# Patient Record
Sex: FEMALE | Race: WHITE | NOT HISPANIC OR LATINO | Employment: OTHER | URBAN - NONMETROPOLITAN AREA
[De-identification: names, ages, dates, MRNs, and addresses within clinical notes are randomized per-mention and may not be internally consistent; named-entity substitution may affect disease eponyms.]

---
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
## 2018-01-25 ENCOUNTER — HOSPITAL ENCOUNTER (EMERGENCY)
Facility: HOSPITAL | Age: 18
Discharge: HOME | End: 2018-01-25
Attending: EMERGENCY MEDICINE | Admitting: EMERGENCY MEDICINE

## 2018-01-25 VITALS
HEART RATE: 86 BPM | HEIGHT: 66 IN | SYSTOLIC BLOOD PRESSURE: 117 MMHG | RESPIRATION RATE: 17 BRPM | BODY MASS INDEX: 21.69 KG/M2 | DIASTOLIC BLOOD PRESSURE: 73 MMHG | WEIGHT: 135 LBS | OXYGEN SATURATION: 100 % | TEMPERATURE: 97.3 F

## 2018-01-25 DIAGNOSIS — J02.0 STREP PHARYNGITIS: Primary | ICD-10-CM

## 2018-01-25 DIAGNOSIS — Z34.90 PREGNANCY, UNSPECIFIED GESTATIONAL AGE: ICD-10-CM

## 2018-01-25 LAB — S PYO AG THROAT QL: POSITIVE

## 2018-01-25 PROCEDURE — 87880 STREP A ASSAY W/OPTIC: CPT | Performed by: EMERGENCY MEDICINE

## 2018-01-25 PROCEDURE — 81025 URINE PREGNANCY TEST: CPT | Performed by: EMERGENCY MEDICINE

## 2018-01-25 PROCEDURE — 99283 EMERGENCY DEPT VISIT LOW MDM: CPT

## 2018-01-25 RX ORDER — PNV NO.118/IRON FUMARATE/FA 29 MG-1 MG
1 TABLET,CHEWABLE ORAL DAILY
Qty: 90 TABLET | Refills: 3 | Status: SHIPPED | OUTPATIENT
Start: 2018-01-25 | End: 2019-01-25

## 2018-01-25 RX ORDER — AMOXICILLIN 875 MG/1
875 TABLET, COATED ORAL EVERY 12 HOURS SCHEDULED
Qty: 14 TABLET | Refills: 0 | Status: SHIPPED | OUTPATIENT
Start: 2018-01-25 | End: 2018-02-01

## 2018-01-25 NOTE — ED PROVIDER NOTES
Subjective   HPI Comments: 18 female presents with a sore throat for 2 days.  She also is concerned she might be pregnant.  She states her last period was very light and she is unsure when she had a normal period.  She denies abdominal pain no vaginal bleeding.  Denies any fever or chills.  The pain is worse with swallowing.      History provided by:  Patient   used: No        Review of Systems   HENT: Positive for sore throat.    All other systems reviewed and are negative.      Past Medical History:   Diagnosis Date   • Depression        No Known Allergies    History reviewed. No pertinent surgical history.    History reviewed. No pertinent family history.    Social History     Social History   • Marital status: Single     Spouse name: N/A   • Number of children: N/A   • Years of education: N/A     Social History Main Topics   • Smoking status: Never Smoker   • Smokeless tobacco: None   • Alcohol use No   • Drug use: No   • Sexual activity: Yes     Other Topics Concern   • None     Social History Narrative   • None           Objective   Physical Exam   Constitutional: She is oriented to person, place, and time. She appears well-developed and well-nourished.   HENT:   Head: Atraumatic.   Right Ear: External ear normal.   Left Ear: External ear normal.   Eyes: EOM are normal.   Neck: Normal range of motion. Neck supple.   Cardiovascular: Normal rate and regular rhythm.    Pulmonary/Chest: Effort normal and breath sounds normal.   Abdominal: Soft. Bowel sounds are normal.   Musculoskeletal: Normal range of motion.   Neurological: She is alert and oriented to person, place, and time. She has normal reflexes.   Skin: Skin is warm and dry.   Psychiatric: She has a normal mood and affect. Her behavior is normal.   Nursing note and vitals reviewed.      Procedures         ED Course  ED Course                  MDM    Final diagnoses:   Strep pharyngitis   Pregnancy, unspecified gestational age             Eber Cotton Jr., PA-C  01/25/18 1200

## 2018-02-14 ENCOUNTER — HOSPITAL ENCOUNTER (EMERGENCY)
Facility: HOSPITAL | Age: 18
Discharge: HOME | End: 2018-02-14
Attending: EMERGENCY MEDICINE | Admitting: EMERGENCY MEDICINE

## 2018-02-14 VITALS
HEIGHT: 66 IN | BODY MASS INDEX: 23.63 KG/M2 | SYSTOLIC BLOOD PRESSURE: 109 MMHG | DIASTOLIC BLOOD PRESSURE: 74 MMHG | HEART RATE: 94 BPM | TEMPERATURE: 97.7 F | OXYGEN SATURATION: 97 % | WEIGHT: 147 LBS | RESPIRATION RATE: 17 BRPM

## 2018-02-14 DIAGNOSIS — N30.00 ACUTE CYSTITIS WITHOUT HEMATURIA: Primary | ICD-10-CM

## 2018-02-14 LAB
B-HCG UR QL: POSITIVE
BACTERIA UR QL AUTO: ABNORMAL /HPF
BILIRUB UR QL STRIP: ABNORMAL
CLARITY UR: ABNORMAL
COLOR UR: YELLOW
GLUCOSE UR STRIP-MCNC: NEGATIVE MG/DL
HGB UR QL STRIP.AUTO: ABNORMAL
HYALINE CASTS UR QL AUTO: ABNORMAL /LPF
KETONES UR QL STRIP: ABNORMAL
LEUKOCYTE ESTERASE UR QL STRIP.AUTO: ABNORMAL
NITRITE UR QL STRIP: NEGATIVE
PH UR STRIP.AUTO: 8.5 [PH] (ref 5–8)
PROT UR QL STRIP: ABNORMAL
RBC # UR: ABNORMAL /HPF
REF LAB TEST METHOD: ABNORMAL
SP GR UR STRIP: 1.02 (ref 1–1.03)
SQUAMOUS #/AREA URNS HPF: ABNORMAL /HPF
UROBILINOGEN UR QL STRIP: ABNORMAL
WBC UR QL AUTO: ABNORMAL /HPF

## 2018-02-14 PROCEDURE — 81025 URINE PREGNANCY TEST: CPT | Performed by: PHYSICIAN ASSISTANT

## 2018-02-14 PROCEDURE — 87077 CULTURE AEROBIC IDENTIFY: CPT | Performed by: PHYSICIAN ASSISTANT

## 2018-02-14 PROCEDURE — 99283 EMERGENCY DEPT VISIT LOW MDM: CPT

## 2018-02-14 PROCEDURE — 81001 URINALYSIS AUTO W/SCOPE: CPT | Performed by: PHYSICIAN ASSISTANT

## 2018-02-14 PROCEDURE — 87186 SC STD MICRODIL/AGAR DIL: CPT | Performed by: PHYSICIAN ASSISTANT

## 2018-02-14 PROCEDURE — 87147 CULTURE TYPE IMMUNOLOGIC: CPT | Performed by: PHYSICIAN ASSISTANT

## 2018-02-14 PROCEDURE — 87086 URINE CULTURE/COLONY COUNT: CPT | Performed by: PHYSICIAN ASSISTANT

## 2018-02-14 RX ORDER — NITROFURANTOIN 25; 75 MG/1; MG/1
100 CAPSULE ORAL EVERY 12 HOURS SCHEDULED
Qty: 10 CAPSULE | Refills: 0 | Status: SHIPPED | OUTPATIENT
Start: 2018-02-14 | End: 2018-02-19

## 2018-02-14 NOTE — ED PROVIDER NOTES
Subjective   HPI Comments: 18 yr old female presents with painful urination, she is pregnant.  She states she took a home pregnancy test on Jan 25th.  She has not seen an OB/GYN yet at this point.  She does not remember her last missed her cycle.  She is complaining of painful urination earning and urinary frequency.      History provided by:  Patient   used: No        Review of Systems   Genitourinary: Positive for decreased urine volume, dysuria, frequency and urgency.   All other systems reviewed and are negative.      Past Medical History:   Diagnosis Date   • Depression        No Known Allergies    History reviewed. No pertinent surgical history.    History reviewed. No pertinent family history.    Social History     Social History   • Marital status: Single     Spouse name: N/A   • Number of children: N/A   • Years of education: N/A     Social History Main Topics   • Smoking status: Never Smoker   • Smokeless tobacco: None   • Alcohol use No   • Drug use: No   • Sexual activity: Yes     Other Topics Concern   • None     Social History Narrative           Objective   Physical Exam   Constitutional: She is oriented to person, place, and time. She appears well-developed and well-nourished.   Eyes: EOM are normal.   Neck: Normal range of motion. Neck supple.   Cardiovascular: Normal rate and regular rhythm.    Pulmonary/Chest: Effort normal and breath sounds normal.   Abdominal: Soft. Bowel sounds are normal. There is no rebound and no guarding.   Musculoskeletal: Normal range of motion.   Neurological: She is alert and oriented to person, place, and time. She has normal reflexes.   Skin: Skin is warm and dry.   Psychiatric: She has a normal mood and affect. Her behavior is normal. Judgment and thought content normal.   Nursing note and vitals reviewed.      Procedures         ED Course  ED Course                  MDM    Final diagnoses:   Acute cystitis without hematuria            Eber  Lula Cameron PA-C  02/14/18 1743

## 2018-02-15 LAB — B-HCG UR QL: POSITIVE

## 2018-02-17 LAB
BACTERIA SPEC AEROBE CULT: ABNORMAL
BACTERIA SPEC AEROBE CULT: ABNORMAL

## 2018-03-17 ENCOUNTER — APPOINTMENT (OUTPATIENT)
Dept: ULTRASOUND IMAGING | Facility: HOSPITAL | Age: 18
End: 2018-03-17

## 2018-03-17 ENCOUNTER — HOSPITAL ENCOUNTER (EMERGENCY)
Facility: HOSPITAL | Age: 18
Discharge: HOME | End: 2018-03-17
Attending: EMERGENCY MEDICINE | Admitting: EMERGENCY MEDICINE

## 2018-03-17 VITALS
DIASTOLIC BLOOD PRESSURE: 64 MMHG | RESPIRATION RATE: 16 BRPM | OXYGEN SATURATION: 99 % | WEIGHT: 143.6 LBS | SYSTOLIC BLOOD PRESSURE: 107 MMHG | BODY MASS INDEX: 23.08 KG/M2 | TEMPERATURE: 98 F | HEIGHT: 66 IN | HEART RATE: 86 BPM

## 2018-03-17 DIAGNOSIS — N39.0 URINARY TRACT INFECTION WITHOUT HEMATURIA, SITE UNSPECIFIED: ICD-10-CM

## 2018-03-17 DIAGNOSIS — O20.0 THREATENED MISCARRIAGE: Primary | ICD-10-CM

## 2018-03-17 LAB
ABO GROUP BLD: NORMAL
ALBUMIN SERPL-MCNC: 4 G/DL (ref 3.5–5)
ALBUMIN/GLOB SERPL: 1.5 G/DL (ref 1–2)
ALP SERPL-CCNC: 43 U/L (ref 38–126)
ALT SERPL W P-5'-P-CCNC: 27 U/L (ref 13–69)
ANION GAP SERPL CALCULATED.3IONS-SCNC: 16.5 MMOL/L
AST SERPL-CCNC: 18 U/L (ref 15–46)
BACTERIA UR QL AUTO: ABNORMAL /HPF
BASOPHILS # BLD AUTO: 0.02 10*3/MM3 (ref 0–0.2)
BASOPHILS NFR BLD AUTO: 0.3 % (ref 0–2.5)
BILIRUB SERPL-MCNC: 0.3 MG/DL (ref 0.2–1.3)
BILIRUB UR QL STRIP: NEGATIVE
BUN BLD-MCNC: 6 MG/DL (ref 7–20)
BUN/CREAT SERPL: 15 (ref 7.1–23.5)
CALCIUM SPEC-SCNC: 9.1 MG/DL (ref 8.4–10.2)
CHLORIDE SERPL-SCNC: 106 MMOL/L (ref 98–107)
CLARITY UR: ABNORMAL
CO2 SERPL-SCNC: 23 MMOL/L (ref 26–30)
COLOR UR: YELLOW
CREAT BLD-MCNC: 0.4 MG/DL (ref 0.6–1.3)
DEPRECATED RDW RBC AUTO: 37.1 FL (ref 37–54)
EOSINOPHIL # BLD AUTO: 0.19 10*3/MM3 (ref 0–0.7)
EOSINOPHIL NFR BLD AUTO: 2.6 % (ref 0–7)
ERYTHROCYTE [DISTWIDTH] IN BLOOD BY AUTOMATED COUNT: 12.4 % (ref 11.5–14.5)
GFR SERPL CREATININE-BSD FRML MDRD: >150 ML/MIN/1.73
GLOBULIN UR ELPH-MCNC: 2.7 GM/DL
GLUCOSE BLD-MCNC: 84 MG/DL (ref 74–98)
GLUCOSE UR STRIP-MCNC: NEGATIVE MG/DL
HCG INTACT+B SERPL-ACNC: NORMAL MIU/ML
HCT VFR BLD AUTO: 35.2 % (ref 37–47)
HGB BLD-MCNC: 12.5 G/DL (ref 12–16)
HGB UR QL STRIP.AUTO: ABNORMAL
HYALINE CASTS UR QL AUTO: ABNORMAL /LPF
IMM GRANULOCYTES # BLD: 0.02 10*3/MM3 (ref 0–0.06)
IMM GRANULOCYTES NFR BLD: 0.3 % (ref 0–0.6)
KETONES UR QL STRIP: NEGATIVE
LEUKOCYTE ESTERASE UR QL STRIP.AUTO: NEGATIVE
LYMPHOCYTES # BLD AUTO: 2.38 10*3/MM3 (ref 0.6–3.4)
LYMPHOCYTES NFR BLD AUTO: 32.5 % (ref 10–50)
MCH RBC QN AUTO: 29.3 PG (ref 27–31)
MCHC RBC AUTO-ENTMCNC: 35.5 G/DL (ref 30–37)
MCV RBC AUTO: 82.6 FL (ref 81–99)
MONOCYTES # BLD AUTO: 0.42 10*3/MM3 (ref 0–0.9)
MONOCYTES NFR BLD AUTO: 5.7 % (ref 0–12)
NEUTROPHILS # BLD AUTO: 4.3 10*3/MM3 (ref 2–6.9)
NEUTROPHILS NFR BLD AUTO: 58.6 % (ref 37–80)
NITRITE UR QL STRIP: NEGATIVE
NRBC BLD MANUAL-RTO: 0 /100 WBC (ref 0–0)
PH UR STRIP.AUTO: <=5 [PH] (ref 5–8)
PLATELET # BLD AUTO: 114 10*3/MM3 (ref 130–400)
PMV BLD AUTO: 10.8 FL (ref 6–12)
POTASSIUM BLD-SCNC: 3.5 MMOL/L (ref 3.5–5.1)
PROT SERPL-MCNC: 6.7 G/DL (ref 6.3–8.2)
PROT UR QL STRIP: NEGATIVE
RBC # BLD AUTO: 4.26 10*6/MM3 (ref 4.2–5.4)
RBC # UR: ABNORMAL /HPF
REF LAB TEST METHOD: ABNORMAL
RH BLD: POSITIVE
SODIUM BLD-SCNC: 142 MMOL/L (ref 137–145)
SP GR UR STRIP: >=1.03 (ref 1–1.03)
SQUAMOUS #/AREA URNS HPF: ABNORMAL /HPF
UROBILINOGEN UR QL STRIP: ABNORMAL
WBC NRBC COR # BLD: 7.33 10*3/MM3 (ref 4.8–10.8)
WBC UR QL AUTO: ABNORMAL /HPF

## 2018-03-17 PROCEDURE — 86900 BLOOD TYPING SEROLOGIC ABO: CPT | Performed by: EMERGENCY MEDICINE

## 2018-03-17 PROCEDURE — 36415 COLL VENOUS BLD VENIPUNCTURE: CPT

## 2018-03-17 PROCEDURE — 84702 CHORIONIC GONADOTROPIN TEST: CPT | Performed by: EMERGENCY MEDICINE

## 2018-03-17 PROCEDURE — 80053 COMPREHEN METABOLIC PANEL: CPT | Performed by: EMERGENCY MEDICINE

## 2018-03-17 PROCEDURE — 86901 BLOOD TYPING SEROLOGIC RH(D): CPT | Performed by: EMERGENCY MEDICINE

## 2018-03-17 PROCEDURE — 85025 COMPLETE CBC W/AUTO DIFF WBC: CPT | Performed by: EMERGENCY MEDICINE

## 2018-03-17 PROCEDURE — 81001 URINALYSIS AUTO W/SCOPE: CPT | Performed by: EMERGENCY MEDICINE

## 2018-03-17 PROCEDURE — 76801 OB US < 14 WKS SINGLE FETUS: CPT

## 2018-03-17 PROCEDURE — 99283 EMERGENCY DEPT VISIT LOW MDM: CPT

## 2018-03-17 RX ORDER — CEPHALEXIN 500 MG/1
500 CAPSULE ORAL 2 TIMES DAILY
Qty: 14 CAPSULE | Refills: 0 | Status: SHIPPED | OUTPATIENT
Start: 2018-03-17 | End: 2018-04-18

## 2018-03-17 RX ORDER — PROMETHAZINE HYDROCHLORIDE 25 MG/1
25 TABLET ORAL EVERY 6 HOURS PRN
Qty: 20 TABLET | Refills: 0 | Status: SHIPPED | OUTPATIENT
Start: 2018-03-17 | End: 2020-10-12

## 2018-03-17 NOTE — ED PROVIDER NOTES
TRIAGE CHIEF COMPLAINT:     Nursing and triage notes reviewed    Chief Complaint   Patient presents with   • Vaginal Bleeding - Pregnant      HPI: Pamela Rothman is a 18 y.o. female who presents to the emergency department complaining of Vaginal bleeding.  Patient states she is currently approximately 13 weeks pregnant and when using the bathroom today had noticed some blood when she was wiping.  Does continue to have some mild bleeding since that time.  She denies any lower abdominal pain or cramping.  She states she did have urinary tract infection recently did not take all of her antibiotics.  She denies any current dysuria or urinary frequency.  She denies fevers or chills.  Denies other complaints currently.     REVIEW OF SYSTEMS: All other systems reviewed and are negative     PAST MEDICAL HISTORY:   Past Medical History:   Diagnosis Date   • Depression         FAMILY HISTORY:   History reviewed. No pertinent family history.     SOCIAL HISTORY:   Social History     Social History   • Marital status: Single     Spouse name: N/A   • Number of children: N/A   • Years of education: N/A     Occupational History   • Not on file.     Social History Main Topics   • Smoking status: Never Smoker   • Smokeless tobacco: Not on file   • Alcohol use No   • Drug use: No   • Sexual activity: Yes     Other Topics Concern   • Not on file     Social History Narrative   • No narrative on file        SURGICAL HISTORY:   History reviewed. No pertinent surgical history.     CURRENT MEDICATIONS:      Medication List      ASK your doctor about these medications    doxycycline 100 MG capsule  Commonly known as:  MONODOX  Take 1 capsule by mouth 2 (Two) Times a Day.     PRENATAL 19 chewable tablet  Chew and swallow 1 tablet by mouth once daily.     sertraline 25 MG tablet  Commonly known as:  ZOLOFT  Take 1 tablet by mouth Every Night.           ALLERGIES: Review of patient's allergies indicates no known allergies.     PHYSICAL  EXAM:   VITAL SIGNS:   Vitals:    18 0420   BP: 107/64   Pulse: 86   Resp: 16   Temp: 98 °F (36.7 °C)   SpO2: 99%      CONSTITUTIONAL: Awake, oriented, appears non-toxic   HENT: Atraumatic, normocephalic, oral mucosa pink and moist, airway patent.  EYES: Conjunctiva clear   NECK: Trachea midline   CARDIOVASCULAR: Normal heart rate, Normal rhythm, No murmurs, rubs, gallops   PULMONARY/CHEST: Clear to auscultation, no rhonchi, wheezes, or rales. Symmetrical breath sounds  ABDOMINAL: Non-distended, soft, non-tender - no rebound or guarding.  NEUROLOGIC: Non-focal, moving all four extremities, no gross sensory or motor deficits.   EXTREMITIES: No clubbing, cyanosis, or edema   SKIN: Warm, Dry, No erythema, No rash     ED COURSE / MEDICAL DECISION MAKING:   Pamela Rothman is a 18 y.o. female who presents to the emergency department for evaluation of vaginal bleeding.  Patient has stable vital signs on arrival.  Patient is nondistressed.  Obtained labs and an ultrasound for further evaluation.  Urine revealed possible slight remaining urinary infection.  There was a large amount of blood as well.  Other labs unremarkable.  Ultrasound obtained which revealed a living intrauterine gestation at just over 13 weeks.  No obvious abnormalities were seen.  We'll treat patient's infection and have her follow with her OB/GYN physician on Monday.  Return precautions discussed.    DECISION TO DISCHARGE/ADMIT: see ED care timeline     FINAL IMPRESSION:   1 -- threatened    2 -- UTI  3 --     Electronically signed by: Patria Snyder MD, 3/17/2018 4:36 AM       Patria Snyder MD  18 0438

## 2018-04-18 ENCOUNTER — APPOINTMENT (OUTPATIENT)
Dept: ULTRASOUND IMAGING | Facility: HOSPITAL | Age: 18
End: 2018-04-18

## 2018-04-18 ENCOUNTER — HOSPITAL ENCOUNTER (EMERGENCY)
Facility: HOSPITAL | Age: 18
Discharge: HOME | End: 2018-04-18
Attending: EMERGENCY MEDICINE | Admitting: EMERGENCY MEDICINE

## 2018-04-18 VITALS
SYSTOLIC BLOOD PRESSURE: 96 MMHG | OXYGEN SATURATION: 99 % | DIASTOLIC BLOOD PRESSURE: 59 MMHG | TEMPERATURE: 98.1 F | WEIGHT: 138.2 LBS | RESPIRATION RATE: 18 BRPM | HEIGHT: 66 IN | BODY MASS INDEX: 22.21 KG/M2 | HEART RATE: 72 BPM

## 2018-04-18 DIAGNOSIS — O46.92 ANTEPARTUM BLEEDING, SECOND TRIMESTER: Primary | ICD-10-CM

## 2018-04-18 LAB
BACTERIA UR QL AUTO: ABNORMAL /HPF
BASOPHILS # BLD AUTO: 0.02 10*3/MM3 (ref 0–0.2)
BASOPHILS NFR BLD AUTO: 0.2 % (ref 0–2.5)
BILIRUB UR QL STRIP: NEGATIVE
CLARITY UR: ABNORMAL
COLOR UR: YELLOW
DEPRECATED RDW RBC AUTO: 41.7 FL (ref 37–54)
EOSINOPHIL # BLD AUTO: 0.22 10*3/MM3 (ref 0–0.7)
EOSINOPHIL NFR BLD AUTO: 2.4 % (ref 0–7)
ERYTHROCYTE [DISTWIDTH] IN BLOOD BY AUTOMATED COUNT: 13.7 % (ref 11.5–14.5)
GLUCOSE UR STRIP-MCNC: NEGATIVE MG/DL
HCT VFR BLD AUTO: 34.5 % (ref 37–47)
HGB BLD-MCNC: 12 G/DL (ref 12–16)
HGB UR QL STRIP.AUTO: ABNORMAL
HYALINE CASTS UR QL AUTO: ABNORMAL /LPF
IMM GRANULOCYTES # BLD: 0.03 10*3/MM3 (ref 0–0.06)
IMM GRANULOCYTES NFR BLD: 0.3 % (ref 0–0.6)
KETONES UR QL STRIP: NEGATIVE
LEUKOCYTE ESTERASE UR QL STRIP.AUTO: ABNORMAL
LYMPHOCYTES # BLD AUTO: 2.39 10*3/MM3 (ref 0.6–3.4)
LYMPHOCYTES NFR BLD AUTO: 25.8 % (ref 10–50)
MCH RBC QN AUTO: 29.3 PG (ref 27–31)
MCHC RBC AUTO-ENTMCNC: 34.8 G/DL (ref 30–37)
MCV RBC AUTO: 84.1 FL (ref 81–99)
MONOCYTES # BLD AUTO: 0.58 10*3/MM3 (ref 0–0.9)
MONOCYTES NFR BLD AUTO: 6.3 % (ref 0–12)
NEUTROPHILS # BLD AUTO: 6.02 10*3/MM3 (ref 2–6.9)
NEUTROPHILS NFR BLD AUTO: 65 % (ref 37–80)
NITRITE UR QL STRIP: NEGATIVE
NRBC BLD MANUAL-RTO: 0 /100 WBC (ref 0–0)
PH UR STRIP.AUTO: 6.5 [PH] (ref 5–8)
PLATELET # BLD AUTO: 138 10*3/MM3 (ref 130–400)
PMV BLD AUTO: 11.3 FL (ref 6–12)
PROT UR QL STRIP: NEGATIVE
RBC # BLD AUTO: 4.1 10*6/MM3 (ref 4.2–5.4)
RBC # UR: ABNORMAL /HPF
REF LAB TEST METHOD: ABNORMAL
SP GR UR STRIP: >=1.03 (ref 1–1.03)
SQUAMOUS #/AREA URNS HPF: ABNORMAL /HPF
UROBILINOGEN UR QL STRIP: ABNORMAL
WBC NRBC COR # BLD: 9.26 10*3/MM3 (ref 4.8–10.8)
WBC UR QL AUTO: ABNORMAL /HPF

## 2018-04-18 PROCEDURE — 85025 COMPLETE CBC W/AUTO DIFF WBC: CPT | Performed by: EMERGENCY MEDICINE

## 2018-04-18 PROCEDURE — 81001 URINALYSIS AUTO W/SCOPE: CPT | Performed by: EMERGENCY MEDICINE

## 2018-04-18 PROCEDURE — 36415 COLL VENOUS BLD VENIPUNCTURE: CPT

## 2018-04-18 PROCEDURE — 99283 EMERGENCY DEPT VISIT LOW MDM: CPT

## 2018-04-18 PROCEDURE — 76805 OB US >/= 14 WKS SNGL FETUS: CPT

## 2018-04-18 NOTE — ED PROVIDER NOTES
Subjective   Patient 18-year-old  1 para 0 at approximately 18 weeks gestation a previous ultrasound.  States she started having bleeding today almost as heavy as a period.  Denies recent sexual intercourse.  Some mild lower abdominal pain without cramping.  Denies urinary symptoms denies vaginal discharge        History provided by:  Patient   used: No    Female  Problem   Primary symptoms include vaginal bleeding.  Primary symptoms include no dysuria. There has been no fever. This is a new problem. The current episode started 1 to 2 hours ago. The problem occurs constantly. The problem has not changed since onset.The symptoms occur at rest. She is pregnant. Pertinent negatives include no abdominal pain. She has tried nothing for the symptoms. Sexual activity: sexually active.       Review of Systems   Constitutional: Negative.  Negative for fever.   HENT: Negative.    Respiratory: Negative.    Cardiovascular: Negative.  Negative for chest pain.   Gastrointestinal: Negative.  Negative for abdominal pain.   Endocrine: Negative.    Genitourinary: Positive for vaginal bleeding. Negative for dysuria.   Skin: Negative.    Neurological: Negative.    Psychiatric/Behavioral: Negative.    All other systems reviewed and are negative.      Past Medical History:   Diagnosis Date   • Depression        No Known Allergies    History reviewed. No pertinent surgical history.    History reviewed. No pertinent family history.    Social History     Social History   • Marital status: Single     Social History Main Topics   • Smoking status: Never Smoker   • Alcohol use No   • Drug use: No   • Sexual activity: Yes     Other Topics Concern   • Not on file           Objective   Physical Exam   Constitutional: She is oriented to person, place, and time. She appears well-developed and well-nourished. No distress.   HENT:   Head: Normocephalic and atraumatic.   Right Ear: External ear normal.   Left Ear: External  ear normal.   Nose: Nose normal.   Eyes: Conjunctivae and EOM are normal. Pupils are equal, round, and reactive to light.   Neck: Normal range of motion. Neck supple. No JVD present. No tracheal deviation present.   Cardiovascular: Normal rate, regular rhythm and normal heart sounds.    No murmur heard.  Pulmonary/Chest: Effort normal and breath sounds normal. No respiratory distress. She has no wheezes.   Abdominal: Soft. Bowel sounds are normal. There is no tenderness.   Musculoskeletal: Normal range of motion. She exhibits no edema or deformity.   Neurological: She is alert and oriented to person, place, and time. No cranial nerve deficit.   Skin: Skin is warm and dry. No rash noted. She is not diaphoretic. No erythema. No pallor.   Psychiatric: She has a normal mood and affect. Her behavior is normal. Thought content normal.   Nursing note and vitals reviewed.      Procedures        US Ob 14 + Weeks Single or First Gestation   Final Result   Living intrauterine pregnancy with normal anterior placenta.      Authenticated by Jose Parker M.D. on 04/18/2018 08:16:49 PM            ED Course  ED Course      Patient states the bleeding has decreased            MDM    Final diagnoses:   Antepartum bleeding, second trimester            Marcial Carroll MD  04/18/18 1755

## 2018-08-03 ENCOUNTER — HOSPITAL ENCOUNTER (OUTPATIENT)
Facility: HOSPITAL | Age: 18
Discharge: HOME | End: 2018-08-03
Attending: OBSTETRICS & GYNECOLOGY | Admitting: MIDWIFE

## 2018-08-03 PROBLEM — Z34.90 PREGNANCY: Status: ACTIVE | Noted: 2018-08-03

## 2018-08-03 LAB
AMORPH URATE CRY URNS QL MICRO: ABNORMAL /HPF
AMPHET+METHAMPHET UR QL: NEGATIVE
AMPHETAMINES UR QL: NEGATIVE
BACTERIA UR QL AUTO: ABNORMAL /HPF
BARBITURATES UR QL SCN: NEGATIVE
BENZODIAZ UR QL SCN: NEGATIVE
BILIRUB UR QL STRIP: NEGATIVE
BUPRENORPHINE SERPL-MCNC: NEGATIVE NG/ML
CANNABINOIDS SERPL QL: NEGATIVE
CLARITY UR: CLEAR
COCAINE UR QL: NEGATIVE
COLOR UR: YELLOW
GLUCOSE UR STRIP-MCNC: NEGATIVE MG/DL
HGB UR QL STRIP.AUTO: ABNORMAL
HYALINE CASTS UR QL AUTO: ABNORMAL /LPF
KETONES UR QL STRIP: ABNORMAL
LEUKOCYTE ESTERASE UR QL STRIP.AUTO: ABNORMAL
METHADONE UR QL SCN: NEGATIVE
MUCOUS THREADS URNS QL MICRO: ABNORMAL /HPF
NITRITE UR QL STRIP: NEGATIVE
OPIATES UR QL: NEGATIVE
OXYCODONE UR QL SCN: NEGATIVE
PCP UR QL SCN: NEGATIVE
PH UR STRIP.AUTO: 6 [PH] (ref 5–8)
PROPOXYPH UR QL: NEGATIVE
PROT UR QL STRIP: ABNORMAL
RBC # UR: ABNORMAL /HPF
REF LAB TEST METHOD: ABNORMAL
SP GR UR STRIP: 1.02 (ref 1–1.03)
SQUAMOUS #/AREA URNS HPF: ABNORMAL /HPF
TRICYCLICS UR QL SCN: NEGATIVE
UROBILINOGEN UR QL STRIP: ABNORMAL
WBC UR QL AUTO: ABNORMAL /HPF

## 2018-08-03 PROCEDURE — 80306 DRUG TEST PRSMV INSTRMNT: CPT | Performed by: MIDWIFE

## 2018-08-03 PROCEDURE — G0463 HOSPITAL OUTPT CLINIC VISIT: HCPCS

## 2018-08-03 PROCEDURE — 59025 FETAL NON-STRESS TEST: CPT

## 2018-08-03 PROCEDURE — 59025 FETAL NON-STRESS TEST: CPT | Performed by: MIDWIFE

## 2018-08-03 PROCEDURE — 81001 URINALYSIS AUTO W/SCOPE: CPT | Performed by: MIDWIFE

## 2018-08-03 PROCEDURE — 87086 URINE CULTURE/COLONY COUNT: CPT | Performed by: MIDWIFE

## 2018-08-04 VITALS
OXYGEN SATURATION: 99 % | HEIGHT: 66 IN | BODY MASS INDEX: 25.39 KG/M2 | DIASTOLIC BLOOD PRESSURE: 69 MMHG | SYSTOLIC BLOOD PRESSURE: 116 MMHG | TEMPERATURE: 98.3 F | HEART RATE: 89 BPM | WEIGHT: 158 LBS | RESPIRATION RATE: 18 BRPM

## 2018-08-04 NOTE — NON STRESS TEST
"  Pamela Rothman, a  at 32w5d with an RODGER of 2018, by Last Menstrual Period, was seen at [facility] for a nonstress test.    Chief Complaint   Patient presents with   • Back Pain     \"I've been having back pain and cramps all day.\"       Patient Active Problem List   Diagnosis   • Depression with suicidal ideation   • Amphetamine and other psychostimulant dependence, continuous   • Cocaine dependence, unspecified   • Cannabis dependence, unspecified   • Opioid abuse, unspecified   • Pregnancy       Start Time:  Stop Time:    Interpretation A  Nonstress Test Interpretation A: Reactive (18 : Michael Tamayo RN)        "

## 2018-08-04 NOTE — DISCHARGE INSTRUCTIONS
Drink at least a gallon of water every day.  May take Tylenol as directed on bottle for aches and pains. Keep scheduled appointment with Dr. De La Torre. Return to labor/delivery for worsening of symptoms.

## 2018-08-05 LAB — BACTERIA SPEC AEROBE CULT: NO GROWTH

## 2018-08-22 ENCOUNTER — HOSPITAL ENCOUNTER (OUTPATIENT)
Facility: HOSPITAL | Age: 18
Discharge: HOME | End: 2018-08-22
Attending: NURSE PRACTITIONER | Admitting: NURSE PRACTITIONER

## 2018-08-22 VITALS
HEART RATE: 83 BPM | OXYGEN SATURATION: 96 % | HEIGHT: 66 IN | SYSTOLIC BLOOD PRESSURE: 110 MMHG | WEIGHT: 162 LBS | BODY MASS INDEX: 26.03 KG/M2 | TEMPERATURE: 98.4 F | RESPIRATION RATE: 18 BRPM | DIASTOLIC BLOOD PRESSURE: 71 MMHG

## 2018-08-22 LAB
AMORPH URATE CRY URNS QL MICRO: ABNORMAL /HPF
AMPHET+METHAMPHET UR QL: NEGATIVE
AMPHETAMINES UR QL: NEGATIVE
BACTERIA UR QL AUTO: ABNORMAL /HPF
BARBITURATES UR QL SCN: NEGATIVE
BENZODIAZ UR QL SCN: NEGATIVE
BILIRUB UR QL STRIP: ABNORMAL
BUPRENORPHINE SERPL-MCNC: NEGATIVE NG/ML
CANNABINOIDS SERPL QL: NEGATIVE
CLARITY UR: ABNORMAL
COCAINE UR QL: NEGATIVE
COD CRY URNS QL: ABNORMAL /HPF
COLOR UR: ABNORMAL
GLUCOSE UR STRIP-MCNC: ABNORMAL MG/DL
HGB UR QL STRIP.AUTO: ABNORMAL
HYALINE CASTS UR QL AUTO: ABNORMAL /LPF
KETONES UR QL STRIP: ABNORMAL
LEUKOCYTE ESTERASE UR QL STRIP.AUTO: ABNORMAL
METHADONE UR QL SCN: NEGATIVE
NITRITE UR QL STRIP: NEGATIVE
OPIATES UR QL: NEGATIVE
OXYCODONE UR QL SCN: NEGATIVE
PCP UR QL SCN: NEGATIVE
PH UR STRIP.AUTO: 5.5 [PH] (ref 5–8)
PROPOXYPH UR QL: NEGATIVE
PROT UR QL STRIP: ABNORMAL
RBC # UR: ABNORMAL /HPF
REF LAB TEST METHOD: ABNORMAL
SP GR UR STRIP: >=1.03 (ref 1–1.03)
SQUAMOUS #/AREA URNS HPF: ABNORMAL /HPF
TRICYCLICS UR QL SCN: NEGATIVE
UROBILINOGEN UR QL STRIP: ABNORMAL
WBC UR QL AUTO: ABNORMAL /HPF

## 2018-08-22 PROCEDURE — 59025 FETAL NON-STRESS TEST: CPT | Performed by: NURSE PRACTITIONER

## 2018-08-22 PROCEDURE — 87086 URINE CULTURE/COLONY COUNT: CPT | Performed by: NURSE PRACTITIONER

## 2018-08-22 PROCEDURE — 80306 DRUG TEST PRSMV INSTRMNT: CPT | Performed by: NURSE PRACTITIONER

## 2018-08-22 PROCEDURE — 84112 EVAL AMNIOTIC FLUID PROTEIN: CPT | Performed by: NURSE PRACTITIONER

## 2018-08-22 PROCEDURE — G0463 HOSPITAL OUTPT CLINIC VISIT: HCPCS

## 2018-08-22 PROCEDURE — 81001 URINALYSIS AUTO W/SCOPE: CPT | Performed by: NURSE PRACTITIONER

## 2018-08-22 PROCEDURE — 59025 FETAL NON-STRESS TEST: CPT

## 2018-08-22 NOTE — NON STRESS TEST
"  Pamela Rothman, a  at 35w3d with an RODGER of 2018, by Last Menstrual Period, was seen at [facility] for a nonstress test.    Chief Complaint   Patient presents with   • Vaginal Bleeding     \"I have had vaginal bleeding  in my wholepregnancy and an hour ago it seemed alot more and dark red\"       Patient Active Problem List   Diagnosis   • Depression with suicidal ideation   • Amphetamine and other psychostimulant dependence, continuous   • Cocaine dependence, unspecified   • Cannabis dependence, unspecified   • Opioid abuse, unspecified   • Pregnancy       Start Time:436  Stop Time:050      Interpretation A  Nonstress Test Interpretation A: Reactive (18 0502 : Marianela Lala RN)          "

## 2018-08-22 NOTE — NURSING NOTE
"Instructions discussed and reveiwed with + feedback and understand, discussed importance of follow up  with personal MD and sign and symptoms of labor and importance of dring water. Client verbalized she was \"OK\" with going home,  "

## 2018-08-23 LAB — A1 MICROGLOB PLACENTAL VAG QL: NEGATIVE

## 2018-08-24 LAB — BACTERIA SPEC AEROBE CULT: NO GROWTH

## 2019-01-01 ENCOUNTER — HOSPITAL ENCOUNTER (EMERGENCY)
Facility: HOSPITAL | Age: 19
Discharge: HOME | End: 2019-01-01
Attending: EMERGENCY MEDICINE | Admitting: EMERGENCY MEDICINE

## 2019-01-01 VITALS
OXYGEN SATURATION: 98 % | TEMPERATURE: 98 F | SYSTOLIC BLOOD PRESSURE: 121 MMHG | HEIGHT: 66 IN | BODY MASS INDEX: 25.04 KG/M2 | HEART RATE: 106 BPM | DIASTOLIC BLOOD PRESSURE: 89 MMHG | WEIGHT: 155.8 LBS | RESPIRATION RATE: 18 BRPM

## 2019-01-01 DIAGNOSIS — J06.9 VIRAL URI WITH COUGH: Primary | ICD-10-CM

## 2019-01-01 LAB
B-HCG UR QL: NEGATIVE
BACTERIA UR QL AUTO: ABNORMAL /HPF
BILIRUB UR QL STRIP: NEGATIVE
CLARITY UR: ABNORMAL
COLOR UR: YELLOW
FLUAV AG NPH QL: NEGATIVE
FLUBV AG NPH QL IA: NEGATIVE
GLUCOSE UR STRIP-MCNC: NEGATIVE MG/DL
HGB UR QL STRIP.AUTO: NEGATIVE
HYALINE CASTS UR QL AUTO: ABNORMAL /LPF
KETONES UR QL STRIP: ABNORMAL
LEUKOCYTE ESTERASE UR QL STRIP.AUTO: ABNORMAL
MUCOUS THREADS URNS QL MICRO: ABNORMAL /HPF
NITRITE UR QL STRIP: NEGATIVE
PH UR STRIP.AUTO: 5.5 [PH] (ref 5–8)
PROT UR QL STRIP: NEGATIVE
RBC # UR: ABNORMAL /HPF
REF LAB TEST METHOD: ABNORMAL
S PYO AG THROAT QL: NEGATIVE
SP GR UR STRIP: >=1.03 (ref 1–1.03)
SQUAMOUS #/AREA URNS HPF: ABNORMAL /HPF
UROBILINOGEN UR QL STRIP: ABNORMAL
WBC UR QL AUTO: ABNORMAL /HPF

## 2019-01-01 PROCEDURE — 87077 CULTURE AEROBIC IDENTIFY: CPT | Performed by: EMERGENCY MEDICINE

## 2019-01-01 PROCEDURE — 81025 URINE PREGNANCY TEST: CPT | Performed by: NURSE PRACTITIONER

## 2019-01-01 PROCEDURE — 87804 INFLUENZA ASSAY W/OPTIC: CPT | Performed by: EMERGENCY MEDICINE

## 2019-01-01 PROCEDURE — 81001 URINALYSIS AUTO W/SCOPE: CPT | Performed by: NURSE PRACTITIONER

## 2019-01-01 PROCEDURE — 99283 EMERGENCY DEPT VISIT LOW MDM: CPT

## 2019-01-01 PROCEDURE — 87081 CULTURE SCREEN ONLY: CPT | Performed by: EMERGENCY MEDICINE

## 2019-01-01 PROCEDURE — 87880 STREP A ASSAY W/OPTIC: CPT | Performed by: EMERGENCY MEDICINE

## 2019-01-01 RX ORDER — BROMPHENIRAMINE MALEATE, PSEUDOEPHEDRINE HYDROCHLORIDE, AND DEXTROMETHORPHAN HYDROBROMIDE 2; 30; 10 MG/5ML; MG/5ML; MG/5ML
5 SYRUP ORAL 4 TIMES DAILY PRN
Qty: 118 ML | Refills: 0 | Status: SHIPPED | OUTPATIENT
Start: 2019-01-01 | End: 2020-10-12

## 2019-01-01 NOTE — DISCHARGE INSTRUCTIONS
Take-home Medications as prescribed.  Her illness is most likely due to a virus.  By their nature, treatment for viral illnesses primarily aimed at symptom management.  It is important immediately rest, stay very well-hydrated and everyone wash hands often so as to prevent the spread of illness.  Tylenol/Motrin for minor pain or fever management.    Follow up with Primary Care in 2- 3 days if symptoms persist or worsen.  Return to the emergency room for uncontrolled pain, nausea, vomiting,  chest pain, shortness or palpitations.    Thank you for allowing us to participate in your care today; we know that you have a choice in healthcare and  appreciate your confidence in [facility].    You have been supplied with 1 new prescription(s) today.

## 2019-01-01 NOTE — ED PROVIDER NOTES
Subjective   18-year-old female patient presents emergency room for evaluation of generalized body aches, sore throat, right ear pain low-grade fever ×3-4 days.  She states her infant son has an upper respiratory infection as been diagnosed by his pediatrician.  No other sick contacts that he is aware of.  She denies any nausea or vomiting.  She does report some mild dysuria but no hematuria or frequency.  No abdominal pain.  She complains of a cough is productive of some thick green sputum.  Headache, sore throat, right ear pain.  No shortness of air, palpitations.  History of asthma.            Review of Systems  A 10 point review of systems including constitutional, ENT, cardiovascular, respiratory, GI, , musculoskeletal, neuro, skin, psychiatric was performed and it was negative with exception of cough, fever, body ache, right ear pain, sore throat.    Past Medical History:   Diagnosis Date   • Bacterial vaginosis     Doesn't remember date but not during this pregnancy.   • Chlamydia     Doesn't remember date but not during this pregnancy   • Depression     Zoloft in past. Does not take any meds for this now.       No Known Allergies    History reviewed. No pertinent surgical history.    History reviewed. No pertinent family history.    Social History     Socioeconomic History   • Marital status: Single     Spouse name: Not on file   • Number of children: Not on file   • Years of education: Not on file   • Highest education level: Not on file   Tobacco Use   • Smoking status: Never Smoker   Substance and Sexual Activity   • Alcohol use: No   • Drug use: No   • Sexual activity: Yes     Partners: Male     Birth control/protection: None           Objective   Physical Exam   Constitutional: She is oriented to person, place, and time. Vital signs are normal. She appears well-developed and well-nourished.  Non-toxic appearance. She does not have a sickly appearance. She appears ill. No distress.   HENT:   Right Ear:  Hearing, external ear and ear canal normal. There is tenderness. Tympanic membrane is erythematous and bulging.   Left Ear: Hearing, tympanic membrane, external ear and ear canal normal.   Nose: Nose normal.   Mouth/Throat: Mucous membranes are normal. Dental caries present. Posterior oropharyngeal erythema present. No oropharyngeal exudate or tonsillar abscesses.   Eyes: Conjunctivae and EOM are normal. Pupils are equal, round, and reactive to light.   Neck: Normal range of motion. Neck supple.   Cardiovascular: Normal rate, regular rhythm and normal heart sounds.   Pulmonary/Chest: Effort normal and breath sounds normal. No respiratory distress. She has no wheezes.   Abdominal: Soft. Bowel sounds are normal. There is no CVA tenderness.   Musculoskeletal: Normal range of motion.   Neurological: She is alert and oriented to person, place, and time.   Skin: Skin is warm and dry. Capillary refill takes less than 2 seconds. No rash noted.   Psychiatric: She has a normal mood and affect.        Procedures  Lab Results (last 24 hours)     Procedure Component Value Units Date/Time    Urinalysis With Microscopic If Indicated (No Culture) - Urine, Clean Catch [[id number]]  (Abnormal) Collected:  01/01/19 1316    Specimen:  Urine, Clean Catch Updated:  01/01/19 1324     Color, UA Yellow     Appearance, UA Slightly Cloudy     pH, UA 5.5     Specific Gravity, UA >=1.030     Glucose, UA Negative     Ketones, UA Trace     Bilirubin, UA Negative     Blood, UA Negative     Protein, UA Negative     Leuk Esterase, UA Trace     Nitrite, UA Negative     Urobilinogen, UA 0.2 E.U./dL    Pregnancy, Urine - Urine, Clean Catch [[id number]]  (Normal) Collected:  01/01/19 1316    Specimen:  Urine, Clean Catch Updated:  01/01/19 1322     HCG, Urine QL Negative    Urinalysis, Microscopic Only - Urine, Clean Catch [[id number]] Collected:  01/01/19 1316    Specimen:  Urine, Clean Catch Updated:  01/01/19 1321    Influenza Antigen, Rapid - Swab,  Nasopharynx [[id number]]  (Normal) Collected:  01/01/19 1203    Specimen:  Swab from Nasopharynx Updated:  01/01/19 1221     Influenza A Ag, EIA Negative     Influenza B Ag, EIA Negative    Rapid Strep A Screen - Swab, Throat [[id number]]  (Normal) Collected:  01/01/19 1203    Specimen:  Swab from Throat Updated:  01/01/19 1220     Strep A Ag Negative    Beta Strep Culture, Throat - Swab, Throat [[id number]] Collected:  01/01/19 1203    Specimen:  Swab from Throat Updated:  01/01/19 1220          Imaging Results (last 24 hours)     ** No results found for the last 24 hours. **               ED Course      Laboratory studies negative for acute infectious process.  Rapid flu, strep both negative.  Patient is hemodynamically and normothermic course observation.  His examination along with subjective and objective data including laboratory studies distant with the diagnosis of an upper respiratory infection, which is most likely viral in nature.  Discussed the nature of viral illnesses with the patient in the treatment is primarily aimed at symptom management.  We'll provide antitussive along with an expectorant.  Recommended Tylenol/Motrin.  Adequate bed rest, increase fluid intake and copious handwashing supposed to prevent spread of illness.  Patient and significant other both verbalized understanding.Medications as prescribed.  Tylenol/Motrin for minor pain or fever management.   It is important to complete course of antibiotics, even if you are feeling better.  Follow up with Primary Care in 2- 3 days if symptoms persist or worsen.  Return to the emergency room for uncontrolled pain, nausea, vomiting,  chest pain, shortness or palpitations.    Thank you for allowing us to participate in your care today; we know that you have a choice in healthcare and  appreciate your confidence in [facility].    You have been supplied with 1 new prescription(s) today.            MDM      Final diagnoses:   Viral URI with  cough            Vandana Hanna, APRN  01/01/19 9833

## 2019-01-03 ENCOUNTER — TELEPHONE (OUTPATIENT)
Dept: EMERGENCY DEPT | Facility: HOSPITAL | Age: 19
End: 2019-01-03

## 2019-01-03 LAB — BACTERIA SPEC AEROBE CULT: ABNORMAL

## 2019-08-11 ENCOUNTER — HOSPITAL ENCOUNTER (OUTPATIENT)
Facility: HOSPITAL | Age: 19
Discharge: HOME | End: 2019-08-11
Attending: OBSTETRICS & GYNECOLOGY | Admitting: OBSTETRICS & GYNECOLOGY

## 2019-08-11 VITALS
SYSTOLIC BLOOD PRESSURE: 125 MMHG | HEIGHT: 66 IN | HEART RATE: 91 BPM | WEIGHT: 167 LBS | RESPIRATION RATE: 16 BRPM | OXYGEN SATURATION: 99 % | BODY MASS INDEX: 26.84 KG/M2 | TEMPERATURE: 98.3 F | DIASTOLIC BLOOD PRESSURE: 83 MMHG

## 2019-08-11 LAB
AMPHET+METHAMPHET UR QL: NEGATIVE
AMPHETAMINES UR QL: NEGATIVE
BACTERIA UR QL AUTO: ABNORMAL /HPF
BARBITURATES UR QL SCN: NEGATIVE
BENZODIAZ UR QL SCN: NEGATIVE
BILIRUB UR QL STRIP: NEGATIVE
BUPRENORPHINE SERPL-MCNC: NEGATIVE NG/ML
CANNABINOIDS SERPL QL: NEGATIVE
CLARITY UR: ABNORMAL
COCAINE UR QL: NEGATIVE
COD CRY URNS QL: ABNORMAL /HPF
COLOR UR: ABNORMAL
GLUCOSE UR STRIP-MCNC: NEGATIVE MG/DL
HGB UR QL STRIP.AUTO: ABNORMAL
HYALINE CASTS UR QL AUTO: ABNORMAL /LPF
KETONES UR QL STRIP: ABNORMAL
LEUKOCYTE ESTERASE UR QL STRIP.AUTO: ABNORMAL
METHADONE UR QL SCN: NEGATIVE
MUCOUS THREADS URNS QL MICRO: ABNORMAL /HPF
NITRITE UR QL STRIP: NEGATIVE
OPIATES UR QL: NEGATIVE
OXYCODONE UR QL SCN: NEGATIVE
PCP UR QL SCN: NEGATIVE
PH UR STRIP.AUTO: 5.5 [PH] (ref 5–8)
PROPOXYPH UR QL: NEGATIVE
PROT UR QL STRIP: ABNORMAL
RBC # UR: ABNORMAL /HPF
REF LAB TEST METHOD: ABNORMAL
SP GR UR STRIP: 1.02 (ref 1–1.03)
SQUAMOUS #/AREA URNS HPF: ABNORMAL /HPF
TRICYCLICS UR QL SCN: NEGATIVE
UROBILINOGEN UR QL STRIP: ABNORMAL
WBC UR QL AUTO: ABNORMAL /HPF

## 2019-08-11 PROCEDURE — 87086 URINE CULTURE/COLONY COUNT: CPT | Performed by: OBSTETRICS & GYNECOLOGY

## 2019-08-11 PROCEDURE — G0463 HOSPITAL OUTPT CLINIC VISIT: HCPCS

## 2019-08-11 PROCEDURE — 81001 URINALYSIS AUTO W/SCOPE: CPT | Performed by: OBSTETRICS & GYNECOLOGY

## 2019-08-11 PROCEDURE — 80306 DRUG TEST PRSMV INSTRMNT: CPT | Performed by: OBSTETRICS & GYNECOLOGY

## 2019-08-11 NOTE — SIGNIFICANT NOTE
Called MD Ridley with pt assessment, lab values and FHT strip.    MD said to give pt information on hydration and tell her to follow up with Dr. Arana tomorrow and pt can be discharged home.

## 2019-08-11 NOTE — L&D DELIVERY NOTE
Triage Note - Nursing Documentation  Labor and Delivery Admission Log    Pamela Pena  : 2000  MRN: [id number]  CSN: [id number]    Date in / Time in:  2019  Time in:     Date out / Time out:    Time out:     Nurse: Jennifer Osborne, RN    Patient Info: She is a 19 y.o. year old  at Unknown with an RODGER of Not found. who was seen on the [facility].    Chief Complaint:   Chief Complaint   Patient presents with   • Abdominal Cramping     started three days ago   • Back Pain   • Hip Pain       Provider Instructions / Disposition: Called MD Ridley with pt assessment, lab values and FHT strip.    MD said to give pt information on hydration and tell her to follow up with Dr. Arana tomorrow and pt can be discharged home. Pt was given education, told to follow up with Dr. Arana tomorrow and stated she felt confident being discharged home.    Patient Active Problem List   Diagnosis   • Depression with suicidal ideation   • Amphetamine and other psychostimulant dependence, continuous   • Cocaine dependence, unspecified   • Cannabis dependence, unspecified   • Opioid abuse, unspecified   • Pregnancy       NST Documentation (Only applicable > 32 weeks): Interpretation A  Nonstress Test Interpretation A: Reactive (19 : Jennifer Osborne, RN)

## 2019-08-12 LAB — BACTERIA SPEC AEROBE CULT: NO GROWTH

## 2019-09-29 ENCOUNTER — HOSPITAL ENCOUNTER (OUTPATIENT)
Facility: HOSPITAL | Age: 19
Discharge: HOME | End: 2019-09-29
Attending: MIDWIFE | Admitting: MIDWIFE

## 2019-09-29 VITALS
OXYGEN SATURATION: 99 % | HEART RATE: 81 BPM | RESPIRATION RATE: 20 BRPM | WEIGHT: 172 LBS | BODY MASS INDEX: 27.76 KG/M2 | TEMPERATURE: 98.6 F

## 2019-09-29 LAB
A1 MICROGLOB PLACENTAL VAG QL: NEGATIVE
BILIRUB BLD-MCNC: NEGATIVE MG/DL
CLARITY, POC: CLEAR
COLOR UR: YELLOW
GLUCOSE UR STRIP-MCNC: ABNORMAL MG/DL
KETONES UR QL: NEGATIVE
LEUKOCYTE EST, POC: NEGATIVE
NITRITE UR-MCNC: NEGATIVE MG/ML
PH UR: 6 [PH] (ref 5–8)
PROT UR STRIP-MCNC: NEGATIVE MG/DL
RBC # UR STRIP: NEGATIVE /UL
SP GR UR: 1 (ref 1–1.03)
UROBILINOGEN UR QL: NORMAL

## 2019-09-29 PROCEDURE — G0463 HOSPITAL OUTPT CLINIC VISIT: HCPCS

## 2019-09-29 PROCEDURE — 59025 FETAL NON-STRESS TEST: CPT | Performed by: MIDWIFE

## 2019-09-29 PROCEDURE — 59025 FETAL NON-STRESS TEST: CPT

## 2019-09-29 PROCEDURE — 84112 EVAL AMNIOTIC FLUID PROTEIN: CPT | Performed by: MIDWIFE

## 2019-09-29 PROCEDURE — 81002 URINALYSIS NONAUTO W/O SCOPE: CPT | Performed by: MIDWIFE

## 2020-08-18 ENCOUNTER — HOSPITAL ENCOUNTER (EMERGENCY)
Facility: HOSPITAL | Age: 20
Discharge: HOME | End: 2020-08-18
Attending: EMERGENCY MEDICINE | Admitting: EMERGENCY MEDICINE

## 2020-08-18 VITALS
DIASTOLIC BLOOD PRESSURE: 80 MMHG | SYSTOLIC BLOOD PRESSURE: 114 MMHG | HEART RATE: 78 BPM | BODY MASS INDEX: 26.82 KG/M2 | TEMPERATURE: 98.2 F | HEIGHT: 65 IN | WEIGHT: 161 LBS | RESPIRATION RATE: 17 BRPM | OXYGEN SATURATION: 99 %

## 2020-08-18 DIAGNOSIS — N89.8 VAGINAL DISCHARGE: Primary | ICD-10-CM

## 2020-08-18 LAB
B-HCG UR QL: NEGATIVE
BACTERIA UR QL AUTO: ABNORMAL /HPF
BILIRUB UR QL STRIP: NEGATIVE
CLARITY UR: CLEAR
COLOR UR: YELLOW
GLUCOSE UR STRIP-MCNC: NEGATIVE MG/DL
HGB UR QL STRIP.AUTO: NEGATIVE
HYALINE CASTS UR QL AUTO: ABNORMAL /LPF
KETONES UR QL STRIP: ABNORMAL
LEUKOCYTE ESTERASE UR QL STRIP.AUTO: ABNORMAL
NITRITE UR QL STRIP: NEGATIVE
PH UR STRIP.AUTO: <=5 [PH] (ref 5–8)
PROT UR QL STRIP: NEGATIVE
RBC # UR: ABNORMAL /HPF
REF LAB TEST METHOD: ABNORMAL
SP GR UR STRIP: >=1.03 (ref 1–1.03)
SQUAMOUS #/AREA URNS HPF: ABNORMAL /HPF
UROBILINOGEN UR QL STRIP: ABNORMAL
WBC UR QL AUTO: ABNORMAL /HPF

## 2020-08-18 PROCEDURE — 87491 CHLMYD TRACH DNA AMP PROBE: CPT | Performed by: PHYSICIAN ASSISTANT

## 2020-08-18 PROCEDURE — 87591 N.GONORRHOEAE DNA AMP PROB: CPT | Performed by: PHYSICIAN ASSISTANT

## 2020-08-18 PROCEDURE — 99283 EMERGENCY DEPT VISIT LOW MDM: CPT

## 2020-08-18 PROCEDURE — 81001 URINALYSIS AUTO W/SCOPE: CPT | Performed by: EMERGENCY MEDICINE

## 2020-08-18 PROCEDURE — 81025 URINE PREGNANCY TEST: CPT | Performed by: PHYSICIAN ASSISTANT

## 2020-08-18 RX ORDER — METRONIDAZOLE 500 MG/1
500 TABLET ORAL 2 TIMES DAILY
Qty: 14 TABLET | Refills: 0 | Status: SHIPPED | OUTPATIENT
Start: 2020-08-18 | End: 2020-08-25

## 2020-08-18 NOTE — ED PROVIDER NOTES
"Subjective   This patient states she is had 4 to 5 days of white/gray vaginal discharge with a \"fishy odor\" no pelvic pain, no dyspareunia, no vaginal bleeding, no dysuria.  She is sexually active without barrier protection with one male partner.  She has low concern for STD.  No abdominal pain.  No fever.  She states she has had BV before and this feels very similar.          Review of Systems   Constitutional: Negative.    HENT: Negative.    Eyes: Negative.    Respiratory: Negative.    Cardiovascular: Negative.    Gastrointestinal: Negative.    Genitourinary: Positive for vaginal discharge. Negative for dysuria, pelvic pain, vaginal bleeding and vaginal pain.   Musculoskeletal: Negative.    Skin: Negative.    Neurological: Negative.    Psychiatric/Behavioral: Negative.        Past Medical History:   Diagnosis Date   • Bacterial vaginosis     Doesn't remember date but not during this pregnancy.   • Chlamydia     Doesn't remember date but not during this pregnancy   • Depression     Zoloft in past. Does not take any meds for this now.       No Known Allergies    History reviewed. No pertinent surgical history.    Family History   Problem Relation Age of Onset   • Diabetes Mother        Social History     Socioeconomic History   • Marital status:      Spouse name: Not on file   • Number of children: Not on file   • Years of education: Not on file   • Highest education level: Not on file   Tobacco Use   • Smoking status: Never Smoker   • Smokeless tobacco: Never Used   Substance and Sexual Activity   • Alcohol use: No   • Drug use: No   • Sexual activity: Yes     Partners: Male     Birth control/protection: None           Objective   Physical Exam   Constitutional: She appears well-developed and well-nourished.   HENT:   Head: Normocephalic and atraumatic.   Neck: Normal range of motion.   Cardiovascular: Normal rate.   Pulmonary/Chest: Effort normal.   Abdominal: Soft. There is no tenderness. There is no " rebound and no guarding.   Musculoskeletal: Normal range of motion.   Neurological: She is alert.   Skin: Skin is warm and dry.   Psychiatric: She has a normal mood and affect. Her behavior is normal. Thought content normal.   Nursing note and vitals reviewed.      Procedures           ED Course  ED Course as of Aug 18 1352   Tue Aug 18, 2020   1341 Leukocytes, UA(!): Moderate (2+) [TM]   1341 Ketones, UA(!): Trace [TM]   1341 HCG, Urine QL: Negative [TM]      ED Course User Index  [TM] Pantera Chinchilla PA-C      Patient wishes to defer pelvic exam as she feels very confident that her symptoms are consistent with bacterial vaginosis which she has had in the past.  She again denies pelvic pain, abdominal pain, dyspareunia.                                     MDM    Final diagnoses:   Vaginal discharge            Pantera Chinchilla PA-C  08/18/20 1352

## 2020-08-20 LAB
C TRACH RRNA SPEC DONR QL NAA+PROBE: NEGATIVE
N GONORRHOEA DNA SPEC QL NAA+PROBE: NEGATIVE

## 2020-09-29 ENCOUNTER — HOSPITAL ENCOUNTER (EMERGENCY)
Facility: HOSPITAL | Age: 20
Discharge: HOME | End: 2020-09-29
Attending: EMERGENCY MEDICINE | Admitting: EMERGENCY MEDICINE

## 2020-09-29 VITALS
OXYGEN SATURATION: 98 % | WEIGHT: 165.2 LBS | SYSTOLIC BLOOD PRESSURE: 116 MMHG | TEMPERATURE: 98.4 F | HEART RATE: 78 BPM | DIASTOLIC BLOOD PRESSURE: 72 MMHG | RESPIRATION RATE: 16 BRPM | BODY MASS INDEX: 26.55 KG/M2 | HEIGHT: 66 IN

## 2020-09-29 DIAGNOSIS — N30.00 ACUTE CYSTITIS WITHOUT HEMATURIA: Primary | ICD-10-CM

## 2020-09-29 LAB
B-HCG UR QL: NEGATIVE
BACTERIA UR QL AUTO: ABNORMAL /HPF
BILIRUB UR QL STRIP: NEGATIVE
CLARITY UR: ABNORMAL
COLOR UR: YELLOW
GLUCOSE UR STRIP-MCNC: NEGATIVE MG/DL
HGB UR QL STRIP.AUTO: ABNORMAL
HYALINE CASTS UR QL AUTO: ABNORMAL /LPF
KETONES UR QL STRIP: NEGATIVE
LEUKOCYTE ESTERASE UR QL STRIP.AUTO: ABNORMAL
NITRITE UR QL STRIP: POSITIVE
PH UR STRIP.AUTO: 5.5 [PH] (ref 5–8)
PROT UR QL STRIP: ABNORMAL
RBC # UR: ABNORMAL /HPF
REF LAB TEST METHOD: ABNORMAL
SP GR UR STRIP: 1.02 (ref 1–1.03)
SQUAMOUS #/AREA URNS HPF: ABNORMAL /HPF
UROBILINOGEN UR QL STRIP: ABNORMAL
WBC UR QL AUTO: ABNORMAL /HPF

## 2020-09-29 PROCEDURE — 81001 URINALYSIS AUTO W/SCOPE: CPT | Performed by: EMERGENCY MEDICINE

## 2020-09-29 PROCEDURE — 99283 EMERGENCY DEPT VISIT LOW MDM: CPT

## 2020-09-29 PROCEDURE — 81025 URINE PREGNANCY TEST: CPT | Performed by: EMERGENCY MEDICINE

## 2020-09-29 RX ORDER — NITROFURANTOIN 25; 75 MG/1; MG/1
100 CAPSULE ORAL 2 TIMES DAILY
Qty: 6 CAPSULE | Refills: 0 | Status: SHIPPED | OUTPATIENT
Start: 2020-09-29 | End: 2020-10-02

## 2020-09-29 RX ORDER — NITROFURANTOIN 25; 75 MG/1; MG/1
100 CAPSULE ORAL ONCE
Status: COMPLETED | OUTPATIENT
Start: 2020-09-29 | End: 2020-09-29

## 2020-09-29 RX ADMIN — NITROFURANTOIN MONOHYDRATE/MACROCRYSTALLINE 100 MG: 25; 75 CAPSULE ORAL at 17:52

## 2020-10-12 ENCOUNTER — HOSPITAL ENCOUNTER (EMERGENCY)
Facility: HOSPITAL | Age: 20
Discharge: HOME | End: 2020-10-12
Attending: STUDENT IN AN ORGANIZED HEALTH CARE EDUCATION/TRAINING PROGRAM | Admitting: STUDENT IN AN ORGANIZED HEALTH CARE EDUCATION/TRAINING PROGRAM

## 2020-10-12 VITALS
SYSTOLIC BLOOD PRESSURE: 110 MMHG | OXYGEN SATURATION: 100 % | BODY MASS INDEX: 27.06 KG/M2 | WEIGHT: 168.4 LBS | TEMPERATURE: 98.6 F | HEART RATE: 70 BPM | HEIGHT: 66 IN | DIASTOLIC BLOOD PRESSURE: 65 MMHG | RESPIRATION RATE: 16 BRPM

## 2020-10-12 DIAGNOSIS — N39.0 URINARY TRACT INFECTION WITHOUT HEMATURIA, SITE UNSPECIFIED: Primary | ICD-10-CM

## 2020-10-12 LAB
B-HCG UR QL: NEGATIVE
BACTERIA UR QL AUTO: ABNORMAL /HPF
BILIRUB UR QL STRIP: NEGATIVE
CLARITY UR: ABNORMAL
COLOR UR: YELLOW
GLUCOSE UR STRIP-MCNC: NEGATIVE MG/DL
HGB UR QL STRIP.AUTO: ABNORMAL
HYALINE CASTS UR QL AUTO: ABNORMAL /LPF
KETONES UR QL STRIP: NEGATIVE
LEUKOCYTE ESTERASE UR QL STRIP.AUTO: ABNORMAL
NITRITE UR QL STRIP: NEGATIVE
PH UR STRIP.AUTO: 5.5 [PH] (ref 5–8)
PROT UR QL STRIP: ABNORMAL
RBC # UR: ABNORMAL /HPF
REF LAB TEST METHOD: ABNORMAL
SP GR UR STRIP: 1.01 (ref 1–1.03)
SQUAMOUS #/AREA URNS HPF: ABNORMAL /HPF
UROBILINOGEN UR QL STRIP: ABNORMAL
WBC UR QL AUTO: ABNORMAL /HPF

## 2020-10-12 PROCEDURE — 87086 URINE CULTURE/COLONY COUNT: CPT | Performed by: STUDENT IN AN ORGANIZED HEALTH CARE EDUCATION/TRAINING PROGRAM

## 2020-10-12 PROCEDURE — 81025 URINE PREGNANCY TEST: CPT | Performed by: STUDENT IN AN ORGANIZED HEALTH CARE EDUCATION/TRAINING PROGRAM

## 2020-10-12 PROCEDURE — 87088 URINE BACTERIA CULTURE: CPT | Performed by: STUDENT IN AN ORGANIZED HEALTH CARE EDUCATION/TRAINING PROGRAM

## 2020-10-12 PROCEDURE — 81001 URINALYSIS AUTO W/SCOPE: CPT | Performed by: STUDENT IN AN ORGANIZED HEALTH CARE EDUCATION/TRAINING PROGRAM

## 2020-10-12 PROCEDURE — 99283 EMERGENCY DEPT VISIT LOW MDM: CPT

## 2020-10-12 PROCEDURE — 87186 SC STD MICRODIL/AGAR DIL: CPT | Performed by: STUDENT IN AN ORGANIZED HEALTH CARE EDUCATION/TRAINING PROGRAM

## 2020-10-12 RX ORDER — FLUCONAZOLE 150 MG/1
150 TABLET ORAL ONCE
Qty: 1 TABLET | Refills: 0 | Status: SHIPPED | OUTPATIENT
Start: 2020-10-12 | End: 2020-10-13

## 2020-10-12 RX ORDER — PHENAZOPYRIDINE HYDROCHLORIDE 200 MG/1
200 TABLET, FILM COATED ORAL 3 TIMES DAILY PRN
Qty: 12 TABLET | Refills: 0 | Status: SHIPPED | OUTPATIENT
Start: 2020-10-12 | End: 2020-11-16

## 2020-10-12 RX ORDER — CEPHALEXIN 500 MG/1
500 CAPSULE ORAL 4 TIMES DAILY
Qty: 28 CAPSULE | Refills: 0 | Status: SHIPPED | OUTPATIENT
Start: 2020-10-12 | End: 2020-11-22

## 2020-10-12 NOTE — ED PROVIDER NOTES
Subjective   20-year-old female who presents to the emergency department complaining of burning with urination.  Symptoms started 2 days ago and are progressively worsened.  She states it is much worse when she pees but currently she does have some pain when she is just sitting.  She does not report a discharge.  States she was treated for a urinary tract infection approximately 2 weeks ago when she completed her medications.  She denies fever, chills, sweats, nausea, vomiting or diarrhea.          Review of Systems   All other systems reviewed and are negative.      Past Medical History:   Diagnosis Date   • Bacterial vaginosis     Doesn't remember date but not during this pregnancy.   • Chlamydia     Doesn't remember date but not during this pregnancy   • Depression     Zoloft in past. Does not take any meds for this now.       No Known Allergies    History reviewed. No pertinent surgical history.    Family History   Problem Relation Age of Onset   • Diabetes Mother        Social History     Socioeconomic History   • Marital status:      Spouse name: Not on file   • Number of children: Not on file   • Years of education: Not on file   • Highest education level: Not on file   Tobacco Use   • Smoking status: Never Smoker   • Smokeless tobacco: Never Used   Substance and Sexual Activity   • Alcohol use: No   • Drug use: No   • Sexual activity: Yes     Partners: Male     Birth control/protection: None           Objective   Physical Exam  Vitals signs and nursing note reviewed.     GEN: No acute distress  Head: Normocephalic, atraumatic  Eyes: Pupils equal round reactive to light  ENT: Posterior pharynx normal in appearance, oral mucosa is moist  Chest: Nontender to palpation  Cardiovascular: Regular rate  Lungs: Clear to auscultation bilaterally  Abdomen: Soft, nontender, nondistended, no peritoneal signs  Extremities: No edema, normal appearance  Neuro: GCS 15  Psych: Mood and affect are  appropriate    Procedures           ED Course                                           MDM  Number of Diagnoses or Management Options  Diagnosis management comments: Differential diagnosis would include UTI, pyelonephritis, interstitial cystitis, STD, or other concerns  Patient declines STD testing at this time.  Recommended follow-up in one week with primary care provider if symptoms not improving.  Advised return to the emergency department if new or worsening symptoms.  Use medications as directed.       Amount and/or Complexity of Data Reviewed  Clinical lab tests: ordered  Decide to obtain previous medical records or to obtain history from someone other than the patient: yes  Obtain history from someone other than the patient: yes  Review and summarize past medical records: yes        Final diagnoses:   Urinary tract infection without hematuria, site unspecified            Marcial Poon MD  10/12/20 6548

## 2020-10-14 LAB — BACTERIA SPEC AEROBE CULT: ABNORMAL

## 2020-11-16 ENCOUNTER — HOSPITAL ENCOUNTER (EMERGENCY)
Facility: HOSPITAL | Age: 20
Discharge: HOME | End: 2020-11-16
Attending: EMERGENCY MEDICINE | Admitting: EMERGENCY MEDICINE

## 2020-11-16 VITALS
BODY MASS INDEX: 27.58 KG/M2 | DIASTOLIC BLOOD PRESSURE: 77 MMHG | OXYGEN SATURATION: 97 % | RESPIRATION RATE: 16 BRPM | TEMPERATURE: 98.6 F | HEIGHT: 66 IN | SYSTOLIC BLOOD PRESSURE: 111 MMHG | HEART RATE: 68 BPM | WEIGHT: 171.6 LBS

## 2020-11-16 DIAGNOSIS — N30.00 ACUTE CYSTITIS WITHOUT HEMATURIA: Primary | ICD-10-CM

## 2020-11-16 LAB
B-HCG UR QL: NEGATIVE
BACTERIA UR QL AUTO: ABNORMAL /HPF
BILIRUB UR QL STRIP: NEGATIVE
CLARITY UR: ABNORMAL
COLOR UR: YELLOW
GLUCOSE UR STRIP-MCNC: NEGATIVE MG/DL
HGB UR QL STRIP.AUTO: ABNORMAL
HYALINE CASTS UR QL AUTO: ABNORMAL /LPF
KETONES UR QL STRIP: NEGATIVE
LEUKOCYTE ESTERASE UR QL STRIP.AUTO: ABNORMAL
NITRITE UR QL STRIP: NEGATIVE
PH UR STRIP.AUTO: <=5 [PH] (ref 5–8)
PROT UR QL STRIP: ABNORMAL
RBC # UR: ABNORMAL /HPF
REF LAB TEST METHOD: ABNORMAL
SP GR UR STRIP: 1.02 (ref 1–1.03)
SQUAMOUS #/AREA URNS HPF: ABNORMAL /HPF
UROBILINOGEN UR QL STRIP: ABNORMAL
WBC UR QL AUTO: ABNORMAL /HPF

## 2020-11-16 PROCEDURE — 87088 URINE BACTERIA CULTURE: CPT | Performed by: EMERGENCY MEDICINE

## 2020-11-16 PROCEDURE — 99283 EMERGENCY DEPT VISIT LOW MDM: CPT

## 2020-11-16 PROCEDURE — 81025 URINE PREGNANCY TEST: CPT | Performed by: PHYSICIAN ASSISTANT

## 2020-11-16 PROCEDURE — 87186 SC STD MICRODIL/AGAR DIL: CPT | Performed by: EMERGENCY MEDICINE

## 2020-11-16 PROCEDURE — 81001 URINALYSIS AUTO W/SCOPE: CPT | Performed by: EMERGENCY MEDICINE

## 2020-11-16 PROCEDURE — 87086 URINE CULTURE/COLONY COUNT: CPT | Performed by: EMERGENCY MEDICINE

## 2020-11-16 RX ORDER — PHENAZOPYRIDINE HYDROCHLORIDE 200 MG/1
200 TABLET, FILM COATED ORAL 3 TIMES DAILY PRN
Qty: 6 TABLET | Refills: 0 | Status: SHIPPED | OUTPATIENT
Start: 2020-11-16 | End: 2020-11-18

## 2020-11-16 RX ORDER — NITROFURANTOIN 25; 75 MG/1; MG/1
100 CAPSULE ORAL 2 TIMES DAILY
Qty: 14 CAPSULE | Refills: 0 | Status: SHIPPED | OUTPATIENT
Start: 2020-11-16 | End: 2020-11-22

## 2020-11-16 NOTE — ED PROVIDER NOTES
Subjective   This patient comes in for evaluation of dysuria and UTI symptoms since last night.  She states she has a history of frequent UTIs.  No vaginal bleeding or discharge.  No chance of pregnancy.  She is sexually active with one partner and does void after intercourse.  No fever, back pain, abdominal pain.  She is no acute distress and nontoxic.          Review of Systems   Constitutional: Negative.    HENT: Negative.    Eyes: Negative.    Respiratory: Negative.    Cardiovascular: Negative.    Gastrointestinal: Negative.    Genitourinary: Positive for dysuria.   Musculoskeletal: Negative.    Skin: Negative.    Neurological: Negative.    Psychiatric/Behavioral: Negative.        Past Medical History:   Diagnosis Date   • Bacterial vaginosis     Doesn't remember date but not during this pregnancy.   • Chlamydia     Doesn't remember date but not during this pregnancy   • Depression     Zoloft in past. Does not take any meds for this now.       No Known Allergies    History reviewed. No pertinent surgical history.    Family History   Problem Relation Age of Onset   • Diabetes Mother        Social History     Socioeconomic History   • Marital status:      Spouse name: Not on file   • Number of children: Not on file   • Years of education: Not on file   • Highest education level: Not on file   Tobacco Use   • Smoking status: Never Smoker   • Smokeless tobacco: Never Used   Substance and Sexual Activity   • Alcohol use: No   • Drug use: No   • Sexual activity: Yes     Partners: Male     Birth control/protection: None           Objective   Physical Exam  Vitals signs and nursing note reviewed.   Constitutional:       General: She is not in acute distress.     Appearance: Normal appearance. She is not ill-appearing, toxic-appearing or diaphoretic.   HENT:      Head: Normocephalic and atraumatic.      Nose: Nose normal.   Eyes:      Extraocular Movements: Extraocular movements intact.   Neck:       Musculoskeletal: Normal range of motion.   Cardiovascular:      Rate and Rhythm: Normal rate and regular rhythm.   Pulmonary:      Effort: Pulmonary effort is normal.   Abdominal:      General: Abdomen is flat.      Palpations: Abdomen is soft.      Tenderness: There is no abdominal tenderness. There is no right CVA tenderness, left CVA tenderness, guarding or rebound.   Musculoskeletal: Normal range of motion.   Skin:     General: Skin is warm and dry.   Neurological:      General: No focal deficit present.      Mental Status: She is alert.   Psychiatric:         Mood and Affect: Mood normal.         Behavior: Behavior normal.         Procedures           ED Course  ED Course as of Nov 16 0935   Mon Nov 16, 2020   0915 Leukocytes, UA(!): Large (3+) [TM]   0915 HCG, Urine QL: Negative [TM]   0932 WBC, UA(!): Too Numerous to Count [TM]      ED Course User Index  [TM] Pantera Chinchilla PA-C                                           [facility]    Final diagnoses:   Acute cystitis without hematuria            Pantera Chinchilla PA-C  11/16/20 0935

## 2020-11-18 LAB
BACTERIA SPEC AEROBE CULT: ABNORMAL
BACTERIA SPEC AEROBE CULT: ABNORMAL

## 2020-11-22 ENCOUNTER — HOSPITAL ENCOUNTER (EMERGENCY)
Facility: HOSPITAL | Age: 20
Discharge: HOME | End: 2020-11-22
Attending: EMERGENCY MEDICINE | Admitting: EMERGENCY MEDICINE

## 2020-11-22 VITALS
RESPIRATION RATE: 18 BRPM | TEMPERATURE: 98.1 F | OXYGEN SATURATION: 99 % | SYSTOLIC BLOOD PRESSURE: 116 MMHG | BODY MASS INDEX: 27.48 KG/M2 | HEART RATE: 68 BPM | DIASTOLIC BLOOD PRESSURE: 76 MMHG | WEIGHT: 171 LBS | HEIGHT: 66 IN

## 2020-11-22 DIAGNOSIS — M79.10 MYALGIA: Primary | ICD-10-CM

## 2020-11-22 LAB
FLUAV AG NPH QL: NEGATIVE
FLUBV AG NPH QL IA: NEGATIVE
SARS-COV-2 RNA PNL SPEC NAA+PROBE: NOT DETECTED

## 2020-11-22 PROCEDURE — C9803 HOPD COVID-19 SPEC COLLECT: HCPCS

## 2020-11-22 PROCEDURE — 99283 EMERGENCY DEPT VISIT LOW MDM: CPT

## 2020-11-22 PROCEDURE — 87804 INFLUENZA ASSAY W/OPTIC: CPT

## 2020-11-22 PROCEDURE — 87635 SARS-COV-2 COVID-19 AMP PRB: CPT

## 2020-12-01 NOTE — ED PROVIDER NOTES
Subjective   20-year-old female presents to the ED requesting flu testing.  She states that she was exposed to the flu and had a friend who tested positive.  They would like to be positive.  She states that she thinks she had a low-grade fever and has had some generalized body aches.  She denies cough shortness of breath or wheeze.  No nausea vomiting diarrhea abdominal pain.  No chest pain.  No shortness of breath.  No prior treatments or limiting factors.  No other complaints at this time.          Review of Systems   Constitutional: Positive for fever.   Musculoskeletal: Positive for myalgias.   All other systems reviewed and are negative.      Past Medical History:   Diagnosis Date   • Bacterial vaginosis     Doesn't remember date but not during this pregnancy.   • Chlamydia     Doesn't remember date but not during this pregnancy   • Depression     Zoloft in past. Does not take any meds for this now.       No Known Allergies    History reviewed. No pertinent surgical history.    Family History   Problem Relation Age of Onset   • Diabetes Mother        Social History     Socioeconomic History   • Marital status:      Spouse name: Not on file   • Number of children: Not on file   • Years of education: Not on file   • Highest education level: Not on file   Tobacco Use   • Smoking status: Never Smoker   • Smokeless tobacco: Never Used   Substance and Sexual Activity   • Alcohol use: No   • Drug use: No   • Sexual activity: Yes     Partners: Male     Birth control/protection: None           Objective   Physical Exam  Vitals signs and nursing note reviewed.   Constitutional:       General: She is not in acute distress.     Appearance: She is well-developed. She is not diaphoretic.   HENT:      Head: Normocephalic and atraumatic.      Nose: Nose normal.   Eyes:      Conjunctiva/sclera: Conjunctivae normal.   Cardiovascular:      Rate and Rhythm: Normal rate and regular rhythm.   Pulmonary:      Effort: Pulmonary  effort is normal. No respiratory distress.      Breath sounds: Normal breath sounds.   Abdominal:      General: There is no distension.      Palpations: Abdomen is soft.      Tenderness: There is no abdominal tenderness. There is no guarding.   Musculoskeletal:         General: No deformity.   Neurological:      Mental Status: She is alert and oriented to person, place, and time.      Cranial Nerves: No cranial nerve deficit.         Procedures           ED Course                                           MDM  Well-appearing nontoxic 20-year-old female requesting flu testing secondary to exposure.  Hemodynamically stable.  Influenza and Covid negative.  Appropriate for discharge follow-up as needed.      Final diagnoses:   Myalgia            Missael Antunez, DO  11/30/20 1930

## 2021-04-20 NOTE — PROGRESS NOTES
Chief Complaint   Patient presents with   • Routine Prenatal Visit     New OB patient. Transfer from Dr. De La Torre's office [facility] 10/13/2021. 15.4 Weeks today        HPI  , 15w2d presents to our office today for initial prenatal visit.    She reports:  She had a Pap smear and ultrasound at Dr. De La Torre's.    She did not have any labs at that appointment.    Pamela reports she is doing very well and has no complaints of.  She reports she she and her  are happy with the pregnancy.    She presently is working and wants to continue.  She reports she does not smoke cigarettes.    She denies any medical illnesses  No major surgeries.    Previous vaginal deliveries and proven 7 pounds 13 ounces.  She states she had no complications in the pregnancy.    Medications:  Prenatal vitamins    Reviewed medical history.  She denies depression in the past - no longer a problem  Substance abuse years ago - no longer problematic    Current Outpatient Medications:   •  ondansetron ODT (ZOFRAN-ODT) 4 MG disintegrating tablet, Place 1 tablet on the tongue Every 6 (Six) Hours As Needed for Nausea., Disp: 12 tablet, Rfl: 0  Past Medical History:   Diagnosis Date   • Bacterial vaginosis     Doesn't remember date but not during this pregnancy.   • Chlamydia     Doesn't remember date but not during this pregnancy   • Depression     Zoloft in past. Does not take any meds for this now.   • Migraine       History reviewed. No pertinent surgical history.  Social History     Socioeconomic History   • Marital status:      Spouse name: Not on file   • Number of children: Not on file   • Years of education: Not on file   • Highest education level: Not on file   Tobacco Use   • Smoking status: Never Smoker   • Smokeless tobacco: Never Used   Vaping Use   • Vaping Use: Never used   Substance and Sexual Activity   • Alcohol use: No   • Drug use: No   • Sexual activity: Yes     Partners: Male     Birth control/protection: None      No  Known Allergies       Family History   Problem Relation Age of Onset   • Diabetes Mother        The following portions of the patient's history were reviewed as note above and updated   current medications allergies, past family history, past medical history, past social history and past surgical history.    ROS  Pertinent items are noted in HPI, all other systems reviewed and negative    Physical Exam  BP 98/56   Wt 72.8 kg (160 lb 9.6 oz)   LMP 12/20/2020 (Exact Date)   BMI 25.92 kg/m²      Psych: Alert and oriented to time, place and person  Mood and affect appropriate   General: pleasant   Musculoskeletal: Normal gait, Full range of motion  Head: normocephalic  Neck: The neck is supple and the trachea is midline  Back: neg CVAT  Lungs:  breathing is unlabored  Abdomen: gravid soft -   Lower Extremities: Neg edema  Genitourinary: External Genitalia without erythema, lesions, or masses    Chart Review:  U/S: 7 wks  EDC 10-13   Pap LGSIL  Total Visits 2  No PN labs         MDM  Impression:  Problems/Risks: Normal Pregnancy  Pap - LGSIL  Hx depression /   Hx substance abuse   Tests done today: Nuswab, AFP, NIPS  NOB Panel   Topics discussed: NOB education included nutrition and exercise  Pregnancy OTC med sheet provided  Genetic screening, CF, NIPS, MSAFP  Written info on   COVID vaccine in pregnancy  2nd trimester of pregnancy  encouraged questions - call prn    Tests next visit: u/s

## 2021-04-23 ENCOUNTER — INITIAL PRENATAL (OUTPATIENT)
Dept: OBSTETRICS AND GYNECOLOGY | Facility: CLINIC | Age: 21
End: 2021-04-23

## 2021-04-23 VITALS — SYSTOLIC BLOOD PRESSURE: 98 MMHG | WEIGHT: 160.6 LBS | DIASTOLIC BLOOD PRESSURE: 56 MMHG | BODY MASS INDEX: 25.92 KG/M2

## 2021-04-23 DIAGNOSIS — Z3A.15 15 WEEKS GESTATION OF PREGNANCY: Primary | ICD-10-CM

## 2021-04-23 DIAGNOSIS — Z34.92 NORMAL PREGNANCY, SECOND TRIMESTER: ICD-10-CM

## 2021-04-23 PROCEDURE — 99213 OFFICE O/P EST LOW 20 MIN: CPT | Performed by: NURSE PRACTITIONER

## 2021-04-23 NOTE — PATIENT INSTRUCTIONS
Second Trimester of Pregnancy  The second trimester is from week 13 through week 28, months 4 through 6. The second trimester is often a time when you feel your best. Your body has also adjusted to being pregnant, and you begin to feel better physically. Usually, morning sickness has lessened or quit completely, you may have more energy, and you may have an increase in appetite. The second trimester is also a time when the fetus is growing rapidly. At the end of the sixth month, the fetus is about 9 inches long and weighs about 1½ pounds. You will likely begin to feel the baby move (quickening) between 18 and 20 weeks of the pregnancy.  BODY CHANGES  Your body goes through many changes during pregnancy. The changes vary from woman to woman.   · Your weight will continue to increase. You will notice your lower abdomen bulging out.  · You may begin to get stretch marks on your hips, abdomen, and breasts.  · You may develop headaches that can be relieved by medicines approved by your health care provider.  · You may urinate more often because the fetus is pressing on your bladder.  · You may develop or continue to have heartburn as a result of your pregnancy.  · You may develop constipation because certain hormones are causing the muscles that push waste through your intestines to slow down.  · You may develop hemorrhoids or swollen, bulging veins (varicose veins).  · You may have back pain because of the weight gain and pregnancy hormones relaxing your joints between the bones in your pelvis and as a result of a shift in weight and the muscles that support your balance.  · Your breasts will continue to grow and be tender.  · Your gums may bleed and may be sensitive to brushing and flossing.  · Dark spots or blotches (chloasma, mask of pregnancy) may develop on your face. This will likely fade after the baby is born.  · A dark line from your belly button to the pubic area (linea nigra) may appear. This will likely fade  after the baby is born.  · You may have changes in your hair. These can include thickening of your hair, rapid growth, and changes in texture. Some women also have hair loss during or after pregnancy, or hair that feels dry or thin. Your hair will most likely return to normal after your baby is born.  WHAT TO EXPECT AT YOUR PRENATAL VISITS  During a routine prenatal visit:  · You will be weighed to make sure you and the fetus are growing normally.  · Your blood pressure will be taken.  · Your abdomen will be measured to track your baby's growth.  · The fetal heartbeat will be listened to.  · Any test results from the previous visit will be discussed.  Your health care provider may ask you:  · How you are feeling.  · If you are feeling the baby move.  · If you have had any abnormal symptoms, such as leaking fluid, bleeding, severe headaches, or abdominal cramping.  · If you are using any tobacco products, including cigarettes, chewing tobacco, and electronic cigarettes.  · If you have any questions.  Other tests that may be performed during your second trimester include:  · Blood tests that check for:  ¨ Low iron levels (anemia).  ¨ Gestational diabetes (between 24 and 28 weeks).  ¨ Rh antibodies.  · Urine tests to check for infections, diabetes, or protein in the urine.  · An ultrasound to confirm the proper growth and development of the baby.  · An amniocentesis to check for possible genetic problems.  · Fetal screens for spina bifida and Down syndrome.  · HIV (human immunodeficiency virus) testing. Routine prenatal testing includes screening for HIV, unless you choose not to have this test.  HOME CARE INSTRUCTIONS   · Avoid all smoking, herbs, alcohol, and unprescribed drugs. These chemicals affect the formation and growth of the baby.  · Do not use any tobacco products, including cigarettes, chewing tobacco, and electronic cigarettes. If you need help quitting, ask your health care provider. You may receive  counseling support and other resources to help you quit.  · Follow your health care provider's instructions regarding medicine use. There are medicines that are either safe or unsafe to take during pregnancy.  · Exercise only as directed by your health care provider. Experiencing uterine cramps is a good sign to stop exercising.  · Continue to eat regular, healthy meals.  · Wear a good support bra for breast tenderness.  · Do not use hot tubs, steam rooms, or saunas.  · Wear your seat belt at all times when driving.  · Avoid raw meat, uncooked cheese, cat litter boxes, and soil used by cats. These carry germs that can cause birth defects in the baby.  · Take your prenatal vitamins.  · Take 6988-1988 mg of calcium daily starting at the 20th week of pregnancy until you deliver your baby.  · Try taking a stool softener (if your health care provider approves) if you develop constipation. Eat more high-fiber foods, such as fresh vegetables or fruit and whole grains. Drink plenty of fluids to keep your urine clear or pale yellow.  · Take warm sitz baths to soothe any pain or discomfort caused by hemorrhoids. Use hemorrhoid cream if your health care provider approves.  · If you develop varicose veins, wear support hose. Elevate your feet for 15 minutes, 3-4 times a day. Limit salt in your diet.  · Avoid heavy lifting, wear low heel shoes, and practice good posture.  · Rest with your legs elevated if you have leg cramps or low back pain.  · Visit your dentist if you have not gone yet during your pregnancy. Use a soft toothbrush to brush your teeth and be gentle when you floss.  · A sexual relationship may be continued unless your health care provider directs you otherwise.  · Continue to go to all your prenatal visits as directed by your health care provider.  SEEK MEDICAL CARE IF:   · You have dizziness.  · You have mild pelvic cramps, pelvic pressure, or nagging pain in the abdominal area.  · You have persistent nausea,  vomiting, or diarrhea.  · You have a bad smelling vaginal discharge.  · You have pain with urination.  SEEK IMMEDIATE MEDICAL CARE IF:   · You have a fever.  · You are leaking fluid from your vagina.  · You have spotting or bleeding from your vagina.  · You have severe abdominal cramping or pain.  · You have rapid weight gain or loss.  · You have shortness of breath with chest pain.  · You notice sudden or extreme swelling of your face, hands, ankles, feet, or legs.  · You have not felt your baby move in over an hour.  · You have severe headaches that do not go away with medicine.  · You have vision changes.     This information is not intended to replace advice given to you by your health care provider. Make sure you discuss any questions you have with your health care provider.

## 2021-04-26 LAB
A VAGINAE DNA VAG QL NAA+PROBE: NORMAL SCORE
BVAB2 DNA VAG QL NAA+PROBE: NORMAL SCORE
C ALBICANS DNA VAG QL NAA+PROBE: NEGATIVE
C GLABRATA DNA VAG QL NAA+PROBE: NEGATIVE
C TRACH DNA VAG QL NAA+PROBE: NEGATIVE
MEGA1 DNA VAG QL NAA+PROBE: NORMAL SCORE
N GONORRHOEA DNA VAG QL NAA+PROBE: NEGATIVE
T VAGINALIS DNA VAG QL NAA+PROBE: NEGATIVE

## 2021-04-28 LAB
ABO GROUP BLD: ABNORMAL
BASOPHILS # BLD AUTO: 0 X10E3/UL (ref 0–0.2)
BASOPHILS NFR BLD AUTO: 0 %
BLD GP AB SCN SERPL QL: NEGATIVE
CFDNA.FET/CFDNA.TOTAL SFR FETUS: NORMAL %
CITATION REF LAB TEST: NORMAL
EOSINOPHIL # BLD AUTO: 0.2 X10E3/UL (ref 0–0.4)
EOSINOPHIL NFR BLD AUTO: 2 %
ERYTHROCYTE [DISTWIDTH] IN BLOOD BY AUTOMATED COUNT: 13.2 % (ref 11.7–15.4)
FET 13+18+21+X+Y ANEUP PLAS.CFDNA: NEGATIVE
FET CHR 21 TS PLAS.CFDNA QL: NEGATIVE
FET MS X RISK WBC.DNA+CFDNA QL: NOT DETECTED
FET SEX PLAS.CFDNA DOSAGE CFDNA: NORMAL
FET TS 13 RISK PLAS.CFDNA QL: NEGATIVE
FET TS 18 RISK WBC.DNA+CFDNA QL: NEGATIVE
FET X + Y ANEUP RISK PLAS.CFDNA SEQ-IMP: NOT DETECTED
GA EST FROM CONCEPTION DATE: NORMAL D
GESTATIONAL AGE > 9:: YES
HBV SURFACE AG SERPL QL IA: NEGATIVE
HCT VFR BLD AUTO: 39.4 % (ref 34–46.6)
HCV AB S/CO SERPL IA: <0.1 S/CO RATIO (ref 0–0.9)
HGB BLD-MCNC: 13.4 G/DL (ref 11.1–15.9)
HIV 1+2 AB+HIV1 P24 AG SERPL QL IA: NON REACTIVE
IMM GRANULOCYTES # BLD AUTO: 0.1 X10E3/UL (ref 0–0.1)
IMM GRANULOCYTES NFR BLD AUTO: 1 %
LAB DIRECTOR NAME PROVIDER: NORMAL
LAB DIRECTOR NAME PROVIDER: NORMAL
LABORATORY COMMENT REPORT: NORMAL
LIMITATIONS OF THE TEST: NORMAL
LYMPHOCYTES # BLD AUTO: 1.6 X10E3/UL (ref 0.7–3.1)
LYMPHOCYTES NFR BLD AUTO: 15 %
MCH RBC QN AUTO: 29.8 PG (ref 26.6–33)
MCHC RBC AUTO-ENTMCNC: 34 G/DL (ref 31.5–35.7)
MCV RBC AUTO: 88 FL (ref 79–97)
MONOCYTES # BLD AUTO: 0.5 X10E3/UL (ref 0.1–0.9)
MONOCYTES NFR BLD AUTO: 5 %
NEGATIVE PREDICTIVE VALUE: NORMAL
NEUTROPHILS # BLD AUTO: 8 X10E3/UL (ref 1.4–7)
NEUTROPHILS NFR BLD AUTO: 77 %
NOTE: NORMAL
PERFORMANCE CHARACTERISTICS: NORMAL
PLATELET # BLD AUTO: 218 X10E3/UL (ref 150–450)
POSITIVE PREDICTIVE VALUE: NORMAL
RBC # BLD AUTO: 4.5 X10E6/UL (ref 3.77–5.28)
REF LAB TEST METHOD: NORMAL
RH BLD: POSITIVE
RPR SER QL: NON REACTIVE
RUBV IGG SERPL IA-ACNC: <0.9 INDEX
TEST PERFORMANCE INFO SPEC: NORMAL
WBC # BLD AUTO: 10.4 X10E3/UL (ref 3.4–10.8)

## 2021-05-17 ENCOUNTER — ROUTINE PRENATAL (OUTPATIENT)
Dept: OBSTETRICS AND GYNECOLOGY | Facility: CLINIC | Age: 21
End: 2021-05-17

## 2021-05-17 VITALS — SYSTOLIC BLOOD PRESSURE: 110 MMHG | DIASTOLIC BLOOD PRESSURE: 68 MMHG | BODY MASS INDEX: 26.63 KG/M2 | WEIGHT: 165 LBS

## 2021-05-17 DIAGNOSIS — Z36.89 ENCOUNTER FOR FETAL ANATOMIC SURVEY: Primary | ICD-10-CM

## 2021-05-17 DIAGNOSIS — J01.10 ACUTE NON-RECURRENT FRONTAL SINUSITIS: ICD-10-CM

## 2021-05-17 PROCEDURE — 99213 OFFICE O/P EST LOW 20 MIN: CPT | Performed by: OBSTETRICS & GYNECOLOGY

## 2021-05-17 RX ORDER — AZITHROMYCIN 250 MG/1
TABLET, FILM COATED ORAL
Qty: 6 TABLET | Refills: 0 | Status: SHIPPED | OUTPATIENT
Start: 2021-05-17 | End: 2021-05-22

## 2021-05-17 NOTE — PROGRESS NOTES
Chief Complaint   Patient presents with   • Routine Prenatal Visit     Anatomy Scan, pt reqauest antibiotic for sinus infection        HPI:   , 18w5d gestation reports doing well    ROS:  See Prenatal Episode/Flowsheet  /68   Wt 74.8 kg (165 lb)   LMP 2020 (Exact Date)   BMI 26.63 kg/m²      EXAM:  EXTREMITIES:  No swelling-See Prenatal Episode/Flowsheet    ABDOMEN:  FHTs/Movement noted-See Prenatal Episode/Flowsheet    URINE GLUCOSE/PROTEIN:  See Prenatal Episode/Flowsheet    PELVIC EXAM:  See Prenatal Episode/Flowsheet  CV:  Lungs:  GYN:    MDM:    Lab Results   Component Value Date    HGB 13.4 2021    RUBELLAABIGG <0.90 (L) 2021    HEPBSAG Negative 2021    ABO O 2021    RH Positive 2021    ABSCRN Negative 2021    ORM2JWG3 Non Reactive 2021    HEPCVIRUSABY <0.1 2021    URINECX >100,000 CFU/mL Escherichia coli (A) 2020    URINECX >100,000 CFU/mL Mixed Shefali Isolated 2020       U/S: Anatomic survey is within normal limits.  Size equal to dates.  75 percentile overall growth.  Anterior placenta.  Three-vessel cord.  Active male fetus, Breech, 1.7 cm EIF left ventricle    1. IUP 18w5d  2. Routine care   3. Left EIF- normal genetic testing  4. Sinusitus: Z pack

## 2021-06-14 ENCOUNTER — ROUTINE PRENATAL (OUTPATIENT)
Dept: OBSTETRICS AND GYNECOLOGY | Facility: CLINIC | Age: 21
End: 2021-06-14

## 2021-06-14 VITALS — SYSTOLIC BLOOD PRESSURE: 114 MMHG | WEIGHT: 168 LBS | DIASTOLIC BLOOD PRESSURE: 76 MMHG | BODY MASS INDEX: 27.12 KG/M2

## 2021-06-14 DIAGNOSIS — Z34.92 PRENATAL CARE IN SECOND TRIMESTER: Primary | ICD-10-CM

## 2021-06-14 PROCEDURE — 99213 OFFICE O/P EST LOW 20 MIN: CPT | Performed by: OBSTETRICS & GYNECOLOGY

## 2021-06-14 NOTE — PROGRESS NOTES
Prenatal Care Visit    Subjective   Chief Complaint   Patient presents with   • Routine Prenatal Visit     No complaints       History:   Pamela is a  currently at 22w5d who presents for a prenatal care visit today.    No issues.    Social History    Tobacco Use      Smoking status: Never Smoker      Smokeless tobacco: Never Used       Objective   /76   Wt 76.2 kg (168 lb)   LMP 2020 (Exact Date)   BMI 27.12 kg/m²   Physical Exam:  Normal, gestational age-appropriate exam today        Plan   Medical Decision Making:    I have reviewed the prenatal labs and ultrasound(s) today. I have reviewed the most recent prenatal progress note(s).    Diagnosis: Supervision of low risk pregnancy   Round ligament pain   Tests/Orders/Rx today: No orders of the defined types were placed in this encounter.      Medication Management: None     Topics discussed: Prenatal care milestones  Round ligament pain   Tests next visit: GCT  HgB   Next visit: 3 week(s)     Raffaele Lee MD  Obstetrics and Gynecology  [facility]

## 2021-07-07 ENCOUNTER — ROUTINE PRENATAL (OUTPATIENT)
Dept: OBSTETRICS AND GYNECOLOGY | Facility: CLINIC | Age: 21
End: 2021-07-07

## 2021-07-07 VITALS — DIASTOLIC BLOOD PRESSURE: 72 MMHG | BODY MASS INDEX: 28.25 KG/M2 | WEIGHT: 175 LBS | SYSTOLIC BLOOD PRESSURE: 110 MMHG

## 2021-07-07 DIAGNOSIS — Z3A.26 26 WEEKS GESTATION OF PREGNANCY: Primary | ICD-10-CM

## 2021-07-07 LAB
BASOPHILS # BLD AUTO: 0.05 10*3/MM3 (ref 0–0.2)
BASOPHILS NFR BLD AUTO: 0.5 % (ref 0–1.5)
EOSINOPHIL # BLD AUTO: 0.3 10*3/MM3 (ref 0–0.4)
EOSINOPHIL NFR BLD AUTO: 3 % (ref 0.3–6.2)
ERYTHROCYTE [DISTWIDTH] IN BLOOD BY AUTOMATED COUNT: 12.1 % (ref 12.3–15.4)
GLUCOSE 1H P 50 G GLC PO SERPL-MCNC: 68 MG/DL (ref 65–139)
HCT VFR BLD AUTO: 37.8 % (ref 34–46.6)
HGB BLD-MCNC: 12.8 G/DL (ref 12–15.9)
IMM GRANULOCYTES # BLD AUTO: 0.09 10*3/MM3 (ref 0–0.05)
IMM GRANULOCYTES NFR BLD AUTO: 0.9 % (ref 0–0.5)
LYMPHOCYTES # BLD AUTO: 2.21 10*3/MM3 (ref 0.7–3.1)
LYMPHOCYTES NFR BLD AUTO: 21.8 % (ref 19.6–45.3)
MCH RBC QN AUTO: 30.8 PG (ref 26.6–33)
MCHC RBC AUTO-ENTMCNC: 33.9 G/DL (ref 31.5–35.7)
MCV RBC AUTO: 91.1 FL (ref 79–97)
MONOCYTES # BLD AUTO: 0.55 10*3/MM3 (ref 0.1–0.9)
MONOCYTES NFR BLD AUTO: 5.4 % (ref 5–12)
NEUTROPHILS # BLD AUTO: 6.94 10*3/MM3 (ref 1.7–7)
NEUTROPHILS NFR BLD AUTO: 68.4 % (ref 42.7–76)
NRBC BLD AUTO-RTO: 0 /100 WBC (ref 0–0.2)
PLATELET # BLD AUTO: 210 10*3/MM3 (ref 140–450)
RBC # BLD AUTO: 4.15 10*6/MM3 (ref 3.77–5.28)
WBC # BLD AUTO: 10.14 10*3/MM3 (ref 3.4–10.8)

## 2021-07-07 PROCEDURE — 99213 OFFICE O/P EST LOW 20 MIN: CPT | Performed by: OBSTETRICS & GYNECOLOGY

## 2021-07-07 NOTE — PROGRESS NOTES
Chief Complaint   Patient presents with   • Routine Prenatal Visit     Glucola today, No Complaints/concerns         HPI:   , 26w0d gestation reports doing well    ROS:  See Prenatal Episode/Flowsheet  /72   Wt 79.4 kg (175 lb)   LMP 2020 (Exact Date)   BMI 28.25 kg/m²      EXAM:  EXTREMITIES:  No swelling-See Prenatal Episode/Flowsheet    ABDOMEN:  FHTs/Movement noted-See Prenatal Episode/Flowsheet    URINE GLUCOSE/PROTEIN:  See Prenatal Episode/Flowsheet    PELVIC EXAM:  See Prenatal Episode/Flowsheet  CV:  Lungs:  GYN:    MDM:    Lab Results   Component Value Date    HGB 13.4 2021    RUBELLAABIGG <0.90 (L) 2021    HEPBSAG Negative 2021    ABO O 2021    RH Positive 2021    ABSCRN Negative 2021    [id number] Non Reactive 2021    HEPCVIRUSABY <0.1 2021    URINECX >100,000 CFU/mL Escherichia coli (A) 2020    URINECX >100,000 CFU/mL Mixed Shefali Isolated 2020       U/S:US Ob 14 + Weeks Single or First Gestation (2021 10:56)        1. IUP 26w0d  2. Routine care   3. Glucola today  4. EIF left ventricle- normal genetictesting.

## 2021-08-02 ENCOUNTER — ROUTINE PRENATAL (OUTPATIENT)
Dept: OBSTETRICS AND GYNECOLOGY | Facility: CLINIC | Age: 21
End: 2021-08-02

## 2021-08-02 VITALS — SYSTOLIC BLOOD PRESSURE: 112 MMHG | DIASTOLIC BLOOD PRESSURE: 68 MMHG | BODY MASS INDEX: 28.89 KG/M2 | WEIGHT: 179 LBS

## 2021-08-02 DIAGNOSIS — Z34.93 NORMAL PREGNANCY, THIRD TRIMESTER: Primary | ICD-10-CM

## 2021-08-02 PROCEDURE — 99213 OFFICE O/P EST LOW 20 MIN: CPT | Performed by: NURSE PRACTITIONER

## 2021-08-02 RX ORDER — FAMOTIDINE 20 MG/1
20 TABLET, FILM COATED ORAL DAILY
Qty: 30 TABLET | Refills: 2 | Status: SHIPPED | OUTPATIENT
Start: 2021-08-02 | End: 2021-08-26 | Stop reason: SDUPTHER

## 2021-08-02 RX ORDER — DOXYLAMINE SUCCINATE 25 MG/1
25 TABLET ORAL NIGHTLY PRN
Qty: 32 TABLET | Refills: 1 | Status: SHIPPED | OUTPATIENT
Start: 2021-08-02 | End: 2021-08-26 | Stop reason: SDUPTHER

## 2021-08-26 ENCOUNTER — ROUTINE PRENATAL (OUTPATIENT)
Dept: OBSTETRICS AND GYNECOLOGY | Facility: CLINIC | Age: 21
End: 2021-08-26

## 2021-08-26 ENCOUNTER — HOSPITAL ENCOUNTER (OUTPATIENT)
Dept: CARDIOLOGY | Facility: HOSPITAL | Age: 21
Discharge: HOME | End: 2021-08-26
Admitting: OBSTETRICS & GYNECOLOGY

## 2021-08-26 VITALS — DIASTOLIC BLOOD PRESSURE: 72 MMHG | WEIGHT: 185 LBS | BODY MASS INDEX: 29.86 KG/M2 | SYSTOLIC BLOOD PRESSURE: 120 MMHG

## 2021-08-26 DIAGNOSIS — R07.9 CHEST PAIN, UNSPECIFIED TYPE: ICD-10-CM

## 2021-08-26 DIAGNOSIS — Z34.93 THIRD TRIMESTER PREGNANCY: Primary | ICD-10-CM

## 2021-08-26 PROCEDURE — 93010 ELECTROCARDIOGRAM REPORT: CPT | Performed by: INTERNAL MEDICINE

## 2021-08-26 PROCEDURE — 93005 ELECTROCARDIOGRAM TRACING: CPT | Performed by: OBSTETRICS & GYNECOLOGY

## 2021-08-26 PROCEDURE — 99213 OFFICE O/P EST LOW 20 MIN: CPT | Performed by: OBSTETRICS & GYNECOLOGY

## 2021-08-26 RX ORDER — DOXYLAMINE SUCCINATE 25 MG/1
25 TABLET ORAL NIGHTLY PRN
Qty: 32 TABLET | Refills: 1 | Status: SHIPPED | OUTPATIENT
Start: 2021-08-26 | End: 2021-10-04

## 2021-08-26 RX ORDER — FAMOTIDINE 20 MG/1
20 TABLET, FILM COATED ORAL DAILY
Qty: 30 TABLET | Refills: 2 | Status: SHIPPED | OUTPATIENT
Start: 2021-08-26 | End: 2021-10-16 | Stop reason: HOSPADM

## 2021-08-26 NOTE — PROGRESS NOTES
Chief Complaint   Patient presents with   • Routine Prenatal Visit     Growth scan done today, round ligament pain, feels popping sensation in pelvis.        HPI:   , 33w1d gestation reports doing well    ROS:  See Prenatal Episode/Flowsheet  /72   Wt 83.9 kg (185 lb)   LMP 2020 (Exact Date)   BMI 29.86 kg/m²      EXAM:  EXTREMITIES:  No swelling-See Prenatal Episode/Flowsheet    ABDOMEN:  FHTs/Movement noted-See Prenatal Episode/Flowsheet    URINE GLUCOSE/PROTEIN:  See Prenatal Episode/Flowsheet    PELVIC EXAM:  See Prenatal Episode/Flowsheet  CV: RRR  Lungs: CTAB  GYN:def    MDM:    Lab Results   Component Value Date    HGB 12.8 2021    RUBELLAABIGG <0.90 (L) 2021    HEPBSAG Negative 2021    ABO O 2021    RH Positive 2021    ABSCRN Negative 2021    [id number] Non Reactive 2021    HEPCVIRUSABY <0.1 2021    URINECX >100,000 CFU/mL Escherichia coli (A) 2020    URINECX >100,000 CFU/mL Mixed Shefali Isolated 2020       U/S: Overall growth 37th percentile.  Symmetric.  ADAM 13.55.  Vertex.  Anterior placenta.  Active fetus    1. IUP 33w1d  2. Routine care   3. Normal growth ultrasound  4. Typyicl pelvis hip pains a/w third TM  5. Chest pains without SOA- intermittent- ECG otday, if recurs then ED

## 2021-08-27 LAB
QT INTERVAL: 342 MS
QTC INTERVAL: 449 MS

## 2021-09-02 ENCOUNTER — ROUTINE PRENATAL (OUTPATIENT)
Dept: OBSTETRICS AND GYNECOLOGY | Facility: CLINIC | Age: 21
End: 2021-09-02

## 2021-09-02 VITALS — DIASTOLIC BLOOD PRESSURE: 70 MMHG | BODY MASS INDEX: 29.86 KG/M2 | WEIGHT: 185 LBS | SYSTOLIC BLOOD PRESSURE: 110 MMHG

## 2021-09-02 DIAGNOSIS — Z34.83 ENCOUNTER FOR SUPERVISION OF OTHER NORMAL PREGNANCY IN THIRD TRIMESTER: Primary | ICD-10-CM

## 2021-09-02 DIAGNOSIS — N94.9 ROUND LIGAMENT PAIN: ICD-10-CM

## 2021-09-02 PROCEDURE — 99213 OFFICE O/P EST LOW 20 MIN: CPT | Performed by: MIDWIFE

## 2021-09-02 NOTE — PROGRESS NOTES
Chief Complaint   Patient presents with   • Routine Prenatal Visit     Patient c/o lower abdominal pain       HPI: Pamela is a  currently at 34w1d who today reports the following:  Baby is active. She is having lower abdominal, groin pain and pain in upper thighs. This has been going on since before last visit. She states she usually wears maternity pants. She denies ctxs.                EXAM:     Vitals:    21 1434   BP: 110/70      Abdomen:   See prenatal flowsheet as noted and reviewed, soft, nontender   Pelvic:  See prenatal flowsheet as noted and reviewed   Urine:  See prenatal flowsheet as noted and reviewed    Lab Results   Component Value Date    ABO O 2021    RH Positive 2021    ABSCRN Negative 2021       MDM:  Impression: Supervision of low risk pregnancy  Round ligament pain   Tests done today: none   Topics discussed: kick counts and fetal movement   labor signs and symptoms  Advised maternity pants or maternity belt  Tylenol as needed discomfort  Reviewed OB labs   Tests next visit: GBS testing                RTO:                        2 weeks    This note was electronically signed.  Lori Garcia, APRN  2021

## 2021-09-16 ENCOUNTER — ROUTINE PRENATAL (OUTPATIENT)
Dept: OBSTETRICS AND GYNECOLOGY | Facility: CLINIC | Age: 21
End: 2021-09-16

## 2021-09-16 VITALS — WEIGHT: 188 LBS | DIASTOLIC BLOOD PRESSURE: 72 MMHG | BODY MASS INDEX: 30.34 KG/M2 | SYSTOLIC BLOOD PRESSURE: 116 MMHG

## 2021-09-16 DIAGNOSIS — Z34.93 THIRD TRIMESTER PREGNANCY: ICD-10-CM

## 2021-09-16 DIAGNOSIS — Z36.85 ANTENATAL SCREENING FOR STREPTOCOCCUS B: Primary | ICD-10-CM

## 2021-09-16 LAB
ERYTHROCYTE [DISTWIDTH] IN BLOOD BY AUTOMATED COUNT: 12.5 % (ref 12.3–15.4)
HCT VFR BLD AUTO: 32.2 % (ref 34–46.6)
HGB BLD-MCNC: 10.9 G/DL (ref 12–15.9)
MCH RBC QN AUTO: 28.1 PG (ref 26.6–33)
MCHC RBC AUTO-ENTMCNC: 33.9 G/DL (ref 31.5–35.7)
MCV RBC AUTO: 83 FL (ref 79–97)
PLATELET # BLD AUTO: 195 10*3/MM3 (ref 140–450)
RBC # BLD AUTO: 3.88 10*6/MM3 (ref 3.77–5.28)
WBC # BLD AUTO: 10.69 10*3/MM3 (ref 3.4–10.8)

## 2021-09-16 PROCEDURE — 99213 OFFICE O/P EST LOW 20 MIN: CPT | Performed by: OBSTETRICS & GYNECOLOGY

## 2021-09-16 NOTE — PROGRESS NOTES
Chief Complaint   Patient presents with   • Routine Prenatal Visit        HPI:   , 36w1d gestation reports doing well    ROS:  See Prenatal Episode/Flowsheet  /72   Wt 85.3 kg (188 lb)   LMP 2020 (Exact Date)   BMI 30.34 kg/m²      EXAM:  EXTREMITIES:  No swelling-See Prenatal Episode/Flowsheet    ABDOMEN:  FHTs/Movement noted-See Prenatal Episode/Flowsheet    URINE GLUCOSE/PROTEIN:  See Prenatal Episode/Flowsheet    PELVIC EXAM:  See Prenatal Episode/Flowsheet  CV:  Lungs:  GYN:    MDM:    Lab Results   Component Value Date    HGB 12.8 2021    RUBELLAABIGG <0.90 (L) 2021    HEPBSAG Negative 2021    ABO O 2021    RH Positive 2021    ABSCRN Negative 2021    [id number] Non Reactive 2021    HEPCVIRUSABY <0.1 2021    URINECX >100,000 CFU/mL Escherichia coli (A) 2020    URINECX >100,000 CFU/mL Mixed Shefali Isolated 2020       U/S:US Ob Follow Up Transabdominal Approach (2021 11:48)    1. IUP 36w1d  2. Routine care   3. GBs done  4. Taking PNV  5. CBC

## 2021-09-19 LAB — GP B STREP DNA SPEC QL NAA+PROBE: NEGATIVE

## 2021-09-19 RX ORDER — FERROUS SULFATE 325(65) MG
325 TABLET ORAL
Qty: 60 TABLET | Refills: 10 | Status: SHIPPED | OUTPATIENT
Start: 2021-09-19 | End: 2022-07-19

## 2021-09-24 ENCOUNTER — ROUTINE PRENATAL (OUTPATIENT)
Dept: OBSTETRICS AND GYNECOLOGY | Facility: CLINIC | Age: 21
End: 2021-09-24

## 2021-09-24 VITALS — DIASTOLIC BLOOD PRESSURE: 68 MMHG | BODY MASS INDEX: 30.67 KG/M2 | WEIGHT: 190 LBS | SYSTOLIC BLOOD PRESSURE: 118 MMHG

## 2021-09-24 DIAGNOSIS — Z34.93 THIRD TRIMESTER PREGNANCY: Primary | ICD-10-CM

## 2021-09-24 PROCEDURE — 99213 OFFICE O/P EST LOW 20 MIN: CPT | Performed by: NURSE PRACTITIONER

## 2021-09-24 NOTE — PROGRESS NOTES
[id number]  Chief Complaint   Patient presents with   • Routine Prenatal Visit     Patient c/o random , mild contractions        HPI  Pamela is a  currently at 37w2d who today reports the following:     Described BH's - no VB or LOF  Good FM   EXAM  /68   Wt 86.2 kg (190 lb)   LMP 2020 (Exact Date)   BMI 30.67 kg/m²  -See Prenatal Assessment  General Appearance:  Pleasant  Lungs: Breathing unlabored  Abdomen:  See flow sheet for Fundal ht, FM, FHT's   Cephalic per leopolds   LE: Neg edema  V/E: optional - declined     Social History     Tobacco Use   • Smoking status: Never Smoker   • Smokeless tobacco: Never Used   Vaping Use   • Vaping Use: Never used   Substance Use Topics   • Alcohol use: No   • Drug use: No         Lab Results   Component Value Date    ABO O 2021    RH Positive 2021    ABSCRN Negative 2021       MDM  Impression: Supervision of normal pregnancy   GBS neg   Tests done today: none   Topics discussed: S/S labor and adeq FM/Kick Counts  encouraged questions - call prn    Tests next visit: none

## 2021-10-01 ENCOUNTER — ROUTINE PRENATAL (OUTPATIENT)
Dept: OBSTETRICS AND GYNECOLOGY | Facility: CLINIC | Age: 21
End: 2021-10-01

## 2021-10-01 VITALS — DIASTOLIC BLOOD PRESSURE: 70 MMHG | WEIGHT: 191 LBS | SYSTOLIC BLOOD PRESSURE: 122 MMHG | BODY MASS INDEX: 30.83 KG/M2

## 2021-10-01 DIAGNOSIS — Z34.83 ENCOUNTER FOR SUPERVISION OF OTHER NORMAL PREGNANCY IN THIRD TRIMESTER: Primary | ICD-10-CM

## 2021-10-01 PROCEDURE — 99213 OFFICE O/P EST LOW 20 MIN: CPT | Performed by: MIDWIFE

## 2021-10-01 NOTE — PROGRESS NOTES
Chief Complaint   Patient presents with   • Routine Prenatal Visit     Pt c/o of having pelvic pressure.       HPI: Pamela is a  currently at 38w2d who today reports the following:  Baby is active. C/O pelvic pressure but not many contractions.                EXAM:     Vitals:    10/01/21 0909   BP: 122/70      Abdomen:   See prenatal flowsheet as noted and reviewed, soft, nontender   Pelvic:  See prenatal flowsheet as noted and reviewed, 1/thick/-2 posterior + blood                                      with exam   Urine:  See prenatal flowsheet as noted and reviewed    Lab Results   Component Value Date    ABO O 2021    RH Positive 2021    ABSCRN Negative 2021       MDM:  Impression: Supervision of low risk pregnancy  Anemia in pregnancy   Tests done today: none   Topics discussed: kick counts and fetal movement  labor signs and symptoms   Reviewed OB labs   Tests next visit: none                RTO:                        1 weeks    This note was electronically signed.  Lori Garcia, LINDA  10/1/2021

## 2021-10-08 ENCOUNTER — ROUTINE PRENATAL (OUTPATIENT)
Dept: OBSTETRICS AND GYNECOLOGY | Facility: CLINIC | Age: 21
End: 2021-10-08

## 2021-10-08 VITALS — DIASTOLIC BLOOD PRESSURE: 78 MMHG | WEIGHT: 191 LBS | BODY MASS INDEX: 30.83 KG/M2 | SYSTOLIC BLOOD PRESSURE: 128 MMHG

## 2021-10-08 DIAGNOSIS — Z34.83 ENCOUNTER FOR SUPERVISION OF OTHER NORMAL PREGNANCY IN THIRD TRIMESTER: Primary | ICD-10-CM

## 2021-10-08 PROCEDURE — 99213 OFFICE O/P EST LOW 20 MIN: CPT | Performed by: NURSE PRACTITIONER

## 2021-10-08 NOTE — PROGRESS NOTES
[id number]  Chief Complaint   Patient presents with   • Routine Prenatal Visit     Patient c/o nose bleeds for  two days        HPI  Pamela is a  currently at 39w2d who today reports the following:   Good FM   C/O some nose bleeds        EXAM  /78   Wt 86.6 kg (191 lb)   LMP 2020 (Exact Date)   BMI 30.83 kg/m²  -See Prenatal Assessment  General Appearance:  Pleasant  Lungs: Breathing unlabored  Abdomen:  See flow sheet for Fundal ht, FM, FHT's  LE: Neg edema  V/E: 1/thick/-2/cephalic    Social History     Tobacco Use   • Smoking status: Never Smoker   • Smokeless tobacco: Never Used   Vaping Use   • Vaping Use: Never used   Substance Use Topics   • Alcohol use: No   • Drug use: No         Lab Results   Component Value Date    ABO O 2021    RH Positive 2021    ABSCRN Negative 2021       MDM  Impression: Supervision of normal pregnancy   anemia   Tests done today: none   Topics discussed: S/S labor and adeq FM/Kick Counts  Induction   encouraged questions - call prn    Tests next visit:

## 2021-10-11 ENCOUNTER — ROUTINE PRENATAL (OUTPATIENT)
Dept: OBSTETRICS AND GYNECOLOGY | Facility: CLINIC | Age: 21
End: 2021-10-11

## 2021-10-11 VITALS — WEIGHT: 194 LBS | SYSTOLIC BLOOD PRESSURE: 118 MMHG | DIASTOLIC BLOOD PRESSURE: 70 MMHG | BODY MASS INDEX: 31.31 KG/M2

## 2021-10-11 DIAGNOSIS — Z34.93 THIRD TRIMESTER PREGNANCY: Primary | ICD-10-CM

## 2021-10-11 PROCEDURE — 99213 OFFICE O/P EST LOW 20 MIN: CPT | Performed by: OBSTETRICS & GYNECOLOGY

## 2021-10-11 RX ORDER — SODIUM CHLORIDE 0.9 % (FLUSH) 0.9 %
10 SYRINGE (ML) INJECTION EVERY 12 HOURS SCHEDULED
Status: CANCELLED | OUTPATIENT
Start: 2021-10-11

## 2021-10-11 RX ORDER — HYDROCODONE BITARTRATE AND ACETAMINOPHEN 5; 325 MG/1; MG/1
2 TABLET ORAL ONCE AS NEEDED
Status: CANCELLED | OUTPATIENT
Start: 2021-10-11

## 2021-10-11 RX ORDER — PROMETHAZINE HYDROCHLORIDE 25 MG/1
12.5 SUPPOSITORY RECTAL EVERY 6 HOURS PRN
Status: CANCELLED | OUTPATIENT
Start: 2021-10-11

## 2021-10-11 RX ORDER — MISOPROSTOL 100 UG/1
800 TABLET ORAL ONCE AS NEEDED
Status: CANCELLED | OUTPATIENT
Start: 2021-10-11 | End: 2021-10-12

## 2021-10-11 RX ORDER — LIDOCAINE HYDROCHLORIDE 10 MG/ML
5 INJECTION, SOLUTION EPIDURAL; INFILTRATION; INTRACAUDAL; PERINEURAL AS NEEDED
Status: CANCELLED | OUTPATIENT
Start: 2021-10-11

## 2021-10-11 RX ORDER — PROMETHAZINE HYDROCHLORIDE 25 MG/1
12.5 TABLET ORAL EVERY 6 HOURS PRN
Status: CANCELLED | OUTPATIENT
Start: 2021-10-11

## 2021-10-11 RX ORDER — SODIUM CHLORIDE 0.9 % (FLUSH) 0.9 %
1-10 SYRINGE (ML) INJECTION AS NEEDED
Status: CANCELLED | OUTPATIENT
Start: 2021-10-11

## 2021-10-11 RX ORDER — ACETAMINOPHEN 325 MG/1
650 TABLET ORAL ONCE AS NEEDED
Status: CANCELLED | OUTPATIENT
Start: 2021-10-11

## 2021-10-11 RX ORDER — CARBOPROST TROMETHAMINE 250 UG/ML
250 INJECTION, SOLUTION INTRAMUSCULAR ONCE AS NEEDED
Status: CANCELLED | OUTPATIENT
Start: 2021-10-11 | End: 2021-10-12

## 2021-10-11 RX ORDER — METHYLERGONOVINE MALEATE 0.2 MG/ML
200 INJECTION INTRAVENOUS ONCE AS NEEDED
Status: CANCELLED | OUTPATIENT
Start: 2021-10-11 | End: 2021-10-12

## 2021-10-11 NOTE — PROGRESS NOTES
Chief Complaint   Patient presents with   • Routine Prenatal Visit     No Complaints/concerns         HPI:   , 39w5d gestation reports doing well    ROS:  See Prenatal Episode/Flowsheet  /70   Wt 88 kg (194 lb)   LMP 2020 (Exact Date)   BMI 31.31 kg/m²      EXAM:  EXTREMITIES:  No swelling-See Prenatal Episode/Flowsheet    ABDOMEN:  FHTs/Movement noted-See Prenatal Episode/Flowsheet    URINE GLUCOSE/PROTEIN:  See Prenatal Episode/Flowsheet    PELVIC EXAM:  See Prenatal Episode/Flowsheet  CV:  Lungs:  GYN:    MDM:    Lab Results   Component Value Date    HGB 10.9 (L) 2021    RUBELLAABIGG <0.90 (L) 2021    HEPBSAG Negative 2021    ABO O 2021    RH Positive 2021    ABSCRN Negative 2021    AIT5MDH9 Non Reactive 2021    HEPCVIRUSABY <0.1 2021    STREPGPB Negative 2021    URINECX >100,000 CFU/mL Escherichia coli (A) 2020    URINECX >100,000 CFU/mL Mixed Shefali Isolated 2020       U/S:US Ob Follow Up Transabdominal Approach (2021 11:48)      1. IUP 39w5d  2. Routine care   3.  Anemia pregnancy: Patient taking her vitamins and iron.  4.  Cervix: 1 to 2 cm, 70% effaced   Low-dose pit tonight

## 2021-10-13 ENCOUNTER — HOSPITAL ENCOUNTER (INPATIENT)
Facility: HOSPITAL | Age: 21
LOS: 3 days | Discharge: HOME | End: 2021-10-16
Attending: MIDWIFE | Admitting: OBSTETRICS & GYNECOLOGY

## 2021-10-13 ENCOUNTER — HOSPITAL ENCOUNTER (INPATIENT)
Dept: LABOR AND DELIVERY | Facility: HOSPITAL | Age: 21
Discharge: HOME | End: 2021-10-13

## 2021-10-13 DIAGNOSIS — Z34.93 THIRD TRIMESTER PREGNANCY: ICD-10-CM

## 2021-10-13 LAB
ABO GROUP BLD: NORMAL
BASOPHILS # BLD AUTO: 0.05 10*3/MM3 (ref 0–0.2)
BASOPHILS NFR BLD AUTO: 0.4 % (ref 0–1.5)
BILIRUB BLD-MCNC: NEGATIVE MG/DL
BLD GP AB SCN SERPL QL: NEGATIVE
CLARITY, POC: CLEAR
COLOR UR: YELLOW
DEPRECATED RDW RBC AUTO: 47.9 FL (ref 37–54)
EOSINOPHIL # BLD AUTO: 0.18 10*3/MM3 (ref 0–0.4)
EOSINOPHIL NFR BLD AUTO: 1.3 % (ref 0.3–6.2)
ERYTHROCYTE [DISTWIDTH] IN BLOOD BY AUTOMATED COUNT: 16.1 % (ref 12.3–15.4)
GLUCOSE UR STRIP-MCNC: NEGATIVE MG/DL
HCT VFR BLD AUTO: 35.3 % (ref 34–46.6)
HGB BLD-MCNC: 11.8 G/DL (ref 12–15.9)
IMM GRANULOCYTES # BLD AUTO: 0.2 10*3/MM3 (ref 0–0.05)
IMM GRANULOCYTES NFR BLD AUTO: 1.4 % (ref 0–0.5)
KETONES UR QL: ABNORMAL
LEUKOCYTE EST, POC: NEGATIVE
LYMPHOCYTES # BLD AUTO: 2.55 10*3/MM3 (ref 0.7–3.1)
LYMPHOCYTES NFR BLD AUTO: 18.2 % (ref 19.6–45.3)
MCH RBC QN AUTO: 28.3 PG (ref 26.6–33)
MCHC RBC AUTO-ENTMCNC: 33.4 G/DL (ref 31.5–35.7)
MCV RBC AUTO: 84.7 FL (ref 79–97)
MONOCYTES # BLD AUTO: 0.89 10*3/MM3 (ref 0.1–0.9)
MONOCYTES NFR BLD AUTO: 6.4 % (ref 5–12)
NEUTROPHILS NFR BLD AUTO: 10.13 10*3/MM3 (ref 1.7–7)
NEUTROPHILS NFR BLD AUTO: 72.3 % (ref 42.7–76)
NITRITE UR-MCNC: NEGATIVE MG/ML
NRBC BLD AUTO-RTO: 0 /100 WBC (ref 0–0.2)
PH UR: 5 [PH] (ref 5–8)
PLATELET # BLD AUTO: 181 10*3/MM3 (ref 140–450)
PMV BLD AUTO: 11.5 FL (ref 6–12)
PROT UR STRIP-MCNC: NEGATIVE MG/DL
RBC # BLD AUTO: 4.17 10*6/MM3 (ref 3.77–5.28)
RBC # UR STRIP: NEGATIVE /UL
RH BLD: POSITIVE
SARS-COV-2 RNA PNL SPEC NAA+PROBE: NOT DETECTED
SP GR UR: 1.03 (ref 1–1.03)
T&S EXPIRATION DATE: NORMAL
UROBILINOGEN UR QL: NORMAL
WBC # BLD AUTO: 14 10*3/MM3 (ref 3.4–10.8)

## 2021-10-13 PROCEDURE — 87635 SARS-COV-2 COVID-19 AMP PRB: CPT | Performed by: MIDWIFE

## 2021-10-13 PROCEDURE — 59025 FETAL NON-STRESS TEST: CPT | Performed by: MIDWIFE

## 2021-10-13 PROCEDURE — 59025 FETAL NON-STRESS TEST: CPT

## 2021-10-13 PROCEDURE — 86900 BLOOD TYPING SEROLOGIC ABO: CPT | Performed by: OBSTETRICS & GYNECOLOGY

## 2021-10-13 PROCEDURE — 86850 RBC ANTIBODY SCREEN: CPT | Performed by: OBSTETRICS & GYNECOLOGY

## 2021-10-13 PROCEDURE — 0UQMXZZ REPAIR VULVA, EXTERNAL APPROACH: ICD-10-PCS | Performed by: OBSTETRICS & GYNECOLOGY

## 2021-10-13 PROCEDURE — 51703 INSERT BLADDER CATH COMPLEX: CPT

## 2021-10-13 PROCEDURE — 85025 COMPLETE CBC W/AUTO DIFF WBC: CPT | Performed by: OBSTETRICS & GYNECOLOGY

## 2021-10-13 PROCEDURE — 3E033VJ INTRODUCTION OF OTHER HORMONE INTO PERIPHERAL VEIN, PERCUTANEOUS APPROACH: ICD-10-PCS | Performed by: OBSTETRICS & GYNECOLOGY

## 2021-10-13 PROCEDURE — 86901 BLOOD TYPING SEROLOGIC RH(D): CPT | Performed by: OBSTETRICS & GYNECOLOGY

## 2021-10-13 PROCEDURE — 81002 URINALYSIS NONAUTO W/O SCOPE: CPT | Performed by: MIDWIFE

## 2021-10-13 PROCEDURE — 80306 DRUG TEST PRSMV INSTRMNT: CPT | Performed by: MIDWIFE

## 2021-10-13 RX ORDER — OXYTOCIN/0.9 % SODIUM CHLORIDE 30/500 ML
2 PLASTIC BAG, INJECTION (ML) INTRAVENOUS
Status: DISCONTINUED | OUTPATIENT
Start: 2021-10-13 | End: 2021-10-14

## 2021-10-13 RX ORDER — MORPHINE SULFATE 2 MG/ML
6 INJECTION, SOLUTION INTRAMUSCULAR; INTRAVENOUS EVERY 4 HOURS PRN
Status: DISCONTINUED | OUTPATIENT
Start: 2021-10-13 | End: 2021-10-14

## 2021-10-13 RX ORDER — SODIUM CHLORIDE 0.9 % (FLUSH) 0.9 %
10 SYRINGE (ML) INJECTION EVERY 12 HOURS SCHEDULED
Status: DISCONTINUED | OUTPATIENT
Start: 2021-10-13 | End: 2021-10-14 | Stop reason: HOSPADM

## 2021-10-13 RX ORDER — LIDOCAINE HYDROCHLORIDE 10 MG/ML
5 INJECTION, SOLUTION EPIDURAL; INFILTRATION; INTRACAUDAL; PERINEURAL AS NEEDED
Status: DISCONTINUED | OUTPATIENT
Start: 2021-10-13 | End: 2021-10-14 | Stop reason: HOSPADM

## 2021-10-13 RX ORDER — PROMETHAZINE HYDROCHLORIDE 12.5 MG/1
12.5 SUPPOSITORY RECTAL EVERY 6 HOURS PRN
Status: DISCONTINUED | OUTPATIENT
Start: 2021-10-13 | End: 2021-10-14 | Stop reason: HOSPADM

## 2021-10-13 RX ORDER — SODIUM CHLORIDE 0.9 % (FLUSH) 0.9 %
1-10 SYRINGE (ML) INJECTION AS NEEDED
Status: DISCONTINUED | OUTPATIENT
Start: 2021-10-13 | End: 2021-10-14 | Stop reason: HOSPADM

## 2021-10-13 RX ORDER — PROMETHAZINE HYDROCHLORIDE 12.5 MG/1
12.5 TABLET ORAL EVERY 6 HOURS PRN
Status: DISCONTINUED | OUTPATIENT
Start: 2021-10-13 | End: 2021-10-14 | Stop reason: HOSPADM

## 2021-10-14 ENCOUNTER — ANESTHESIA EVENT (OUTPATIENT)
Dept: LABOR AND DELIVERY | Facility: HOSPITAL | Age: 21
End: 2021-10-14

## 2021-10-14 ENCOUNTER — ANESTHESIA (OUTPATIENT)
Dept: LABOR AND DELIVERY | Facility: HOSPITAL | Age: 21
End: 2021-10-14

## 2021-10-14 LAB
AMPHET+METHAMPHET UR QL: NEGATIVE
AMPHETAMINES UR QL: NEGATIVE
BARBITURATES UR QL SCN: NEGATIVE
BENZODIAZ UR QL SCN: NEGATIVE
BUPRENORPHINE SERPL-MCNC: NEGATIVE NG/ML
CANNABINOIDS SERPL QL: NEGATIVE
COCAINE UR QL: NEGATIVE
METHADONE UR QL SCN: NEGATIVE
OPIATES UR QL: NEGATIVE
OXYCODONE UR QL SCN: NEGATIVE
PCP UR QL SCN: NEGATIVE
PROPOXYPH UR QL: NEGATIVE
TRICYCLICS UR QL SCN: NEGATIVE

## 2021-10-14 PROCEDURE — 51703 INSERT BLADDER CATH COMPLEX: CPT

## 2021-10-14 PROCEDURE — C1755 CATHETER, INTRASPINAL: HCPCS | Performed by: NURSE ANESTHETIST, CERTIFIED REGISTERED

## 2021-10-14 PROCEDURE — 25010000002 ROPIVACAINE PER 1 MG: Performed by: NURSE ANESTHETIST, CERTIFIED REGISTERED

## 2021-10-14 PROCEDURE — 59410 OBSTETRICAL CARE: CPT | Performed by: NURSE PRACTITIONER

## 2021-10-14 PROCEDURE — 25010000002 FENTANYL CITRATE (PF) 250 MCG/5ML SOLUTION 5 ML VIAL: Performed by: NURSE ANESTHETIST, CERTIFIED REGISTERED

## 2021-10-14 RX ORDER — EPHEDRINE SULFATE 5 MG/ML
5 INJECTION INTRAVENOUS
Status: DISCONTINUED | OUTPATIENT
Start: 2021-10-14 | End: 2021-10-14 | Stop reason: HOSPADM

## 2021-10-14 RX ORDER — DOCUSATE SODIUM 100 MG/1
100 CAPSULE, LIQUID FILLED ORAL 2 TIMES DAILY
Status: DISCONTINUED | OUTPATIENT
Start: 2021-10-14 | End: 2021-10-16 | Stop reason: HOSPADM

## 2021-10-14 RX ORDER — BISACODYL 10 MG
10 SUPPOSITORY, RECTAL RECTAL DAILY PRN
Status: DISCONTINUED | OUTPATIENT
Start: 2021-10-15 | End: 2021-10-16 | Stop reason: HOSPADM

## 2021-10-14 RX ORDER — SODIUM CHLORIDE 0.9 % (FLUSH) 0.9 %
1-10 SYRINGE (ML) INJECTION AS NEEDED
Status: DISCONTINUED | OUTPATIENT
Start: 2021-10-14 | End: 2021-10-16 | Stop reason: HOSPADM

## 2021-10-14 RX ORDER — TRISODIUM CITRATE DIHYDRATE AND CITRIC ACID MONOHYDRATE 500; 334 MG/5ML; MG/5ML
30 SOLUTION ORAL ONCE
Status: DISCONTINUED | OUTPATIENT
Start: 2021-10-14 | End: 2021-10-14 | Stop reason: HOSPADM

## 2021-10-14 RX ORDER — PRENATAL VIT/IRON FUM/FOLIC AC 27MG-0.8MG
1 TABLET ORAL DAILY
Status: DISCONTINUED | OUTPATIENT
Start: 2021-10-14 | End: 2021-10-16 | Stop reason: HOSPADM

## 2021-10-14 RX ORDER — OXYTOCIN/0.9 % SODIUM CHLORIDE 30/500 ML
85 PLASTIC BAG, INJECTION (ML) INTRAVENOUS CONTINUOUS
Status: DISCONTINUED | OUTPATIENT
Start: 2021-10-14 | End: 2021-10-14

## 2021-10-14 RX ORDER — ONDANSETRON 4 MG/1
4 TABLET, FILM COATED ORAL EVERY 8 HOURS PRN
Status: DISCONTINUED | OUTPATIENT
Start: 2021-10-14 | End: 2021-10-16 | Stop reason: HOSPADM

## 2021-10-14 RX ORDER — PROMETHAZINE HYDROCHLORIDE 25 MG/1
25 TABLET ORAL EVERY 6 HOURS PRN
Status: DISCONTINUED | OUTPATIENT
Start: 2021-10-14 | End: 2021-10-16 | Stop reason: HOSPADM

## 2021-10-14 RX ORDER — ONDANSETRON 2 MG/ML
4 INJECTION INTRAMUSCULAR; INTRAVENOUS ONCE AS NEEDED
Status: DISCONTINUED | OUTPATIENT
Start: 2021-10-14 | End: 2021-10-14 | Stop reason: HOSPADM

## 2021-10-14 RX ORDER — ONDANSETRON 2 MG/ML
4 INJECTION INTRAMUSCULAR; INTRAVENOUS EVERY 6 HOURS PRN
Status: DISCONTINUED | OUTPATIENT
Start: 2021-10-14 | End: 2021-10-16 | Stop reason: HOSPADM

## 2021-10-14 RX ORDER — HYDROCORTISONE 25 MG/G
1 CREAM TOPICAL AS NEEDED
Status: DISCONTINUED | OUTPATIENT
Start: 2021-10-14 | End: 2021-10-16 | Stop reason: HOSPADM

## 2021-10-14 RX ORDER — SODIUM CHLORIDE, SODIUM LACTATE, POTASSIUM CHLORIDE, CALCIUM CHLORIDE 600; 310; 30; 20 MG/100ML; MG/100ML; MG/100ML; MG/100ML
125 INJECTION, SOLUTION INTRAVENOUS CONTINUOUS
Status: DISCONTINUED | OUTPATIENT
Start: 2021-10-14 | End: 2021-10-14

## 2021-10-14 RX ORDER — IBUPROFEN 600 MG/1
600 TABLET ORAL EVERY 6 HOURS PRN
Status: DISCONTINUED | OUTPATIENT
Start: 2021-10-14 | End: 2021-10-16 | Stop reason: HOSPADM

## 2021-10-14 RX ORDER — PROMETHAZINE HYDROCHLORIDE 12.5 MG/1
12.5 SUPPOSITORY RECTAL EVERY 6 HOURS PRN
Status: DISCONTINUED | OUTPATIENT
Start: 2021-10-14 | End: 2021-10-16 | Stop reason: HOSPADM

## 2021-10-14 RX ORDER — OXYTOCIN/0.9 % SODIUM CHLORIDE 30/500 ML
650 PLASTIC BAG, INJECTION (ML) INTRAVENOUS ONCE
Status: COMPLETED | OUTPATIENT
Start: 2021-10-14 | End: 2021-10-14

## 2021-10-14 RX ORDER — HYDROCODONE BITARTRATE AND ACETAMINOPHEN 7.5; 325 MG/1; MG/1
1 TABLET ORAL EVERY 4 HOURS PRN
Status: DISCONTINUED | OUTPATIENT
Start: 2021-10-14 | End: 2021-10-16 | Stop reason: HOSPADM

## 2021-10-14 RX ORDER — HYDROCODONE BITARTRATE AND ACETAMINOPHEN 5; 325 MG/1; MG/1
1 TABLET ORAL EVERY 4 HOURS PRN
Status: DISCONTINUED | OUTPATIENT
Start: 2021-10-14 | End: 2021-10-16 | Stop reason: HOSPADM

## 2021-10-14 RX ORDER — LANOLIN
CREAM (GRAM) TOPICAL
Status: DISCONTINUED | OUTPATIENT
Start: 2021-10-14 | End: 2021-10-16 | Stop reason: HOSPADM

## 2021-10-14 RX ORDER — DIPHENHYDRAMINE HCL 25 MG
25 CAPSULE ORAL NIGHTLY PRN
Status: DISCONTINUED | OUTPATIENT
Start: 2021-10-14 | End: 2021-10-16 | Stop reason: HOSPADM

## 2021-10-14 RX ADMIN — IBUPROFEN 600 MG: 600 TABLET ORAL at 20:36

## 2021-10-14 RX ADMIN — DOCUSATE SODIUM 100 MG: 100 CAPSULE, LIQUID FILLED ORAL at 20:35

## 2021-10-14 RX ADMIN — BENZOCAINE AND LEVOMENTHOL 1 APPLICATION: 200; 5 SPRAY TOPICAL at 22:07

## 2021-10-14 RX ADMIN — ROPIVACAINE HYDROCHLORIDE 14 ML/HR: 2 INJECTION, SOLUTION EPIDURAL; INFILTRATION at 11:45

## 2021-10-14 RX ADMIN — Medication 2 MILLI-UNITS/MIN: at 00:22

## 2021-10-14 RX ADMIN — Medication 650 ML/HR: at 13:26

## 2021-10-14 RX ADMIN — HYDROCODONE BITARTRATE AND ACETAMINOPHEN 1 TABLET: 5; 325 TABLET ORAL at 22:07

## 2021-10-14 RX ADMIN — Medication 85 ML/HR: at 14:00

## 2021-10-14 RX ADMIN — SODIUM CHLORIDE, POTASSIUM CHLORIDE, SODIUM LACTATE AND CALCIUM CHLORIDE 1000 ML: 600; 310; 30; 20 INJECTION, SOLUTION INTRAVENOUS at 10:09

## 2021-10-14 RX ADMIN — SODIUM CHLORIDE, POTASSIUM CHLORIDE, SODIUM LACTATE AND CALCIUM CHLORIDE 125 ML/HR: 600; 310; 30; 20 INJECTION, SOLUTION INTRAVENOUS at 00:22

## 2021-10-14 NOTE — ANESTHESIA PROCEDURE NOTES
Labor Epidural      Patient location during procedure: OB  Indication:at surgeon's request  Performed By  CRNA: Chandrakant Velasco CRNA  Preanesthetic Checklist  Completed: patient identified, IV checked, site marked, risks and benefits discussed, surgical consent, monitors and equipment checked, pre-op evaluation and timeout performed  Prep:  Pt Position:sitting  Sterile Tech:cap, gloves, gown, mask and sterile barrier  Prep:chlorhexidine gluconate and isopropyl alcohol  Monitoring:blood pressure monitoring and continuous pulse oximetry  Epidural Block Procedure:  Approach:midline  Guidance:palpation technique  Location:L3-L4  Needle Type:Tuohy  Needle Gauge:18 G  Loss of Resistance: 3cm  Cath Depth at skin:7 cm  Paresthesia: none  Aspiration:negative  Test Dose:negative  Post Assessment:  Dressing:occlusive dressing applied and secured with tape  Pt Tolerance:patient tolerated the procedure well with no apparent complications  Complications:no

## 2021-10-14 NOTE — L&D DELIVERY NOTE
[facility]  Vaginal Delivery Note    Delivery     Delivery: Vaginal, Spontaneous     YOB: 2023    Time of Birth:  Gestational Age 1:20 PM   40w1d     Anesthesia:      Delivering clinician: Shruti Park CNM   Forceps?   No   Vacuum? No    Shoulder dystocia present: No        Delivery narrative:   of viable male over an intact perineum.  Infant with spontaneous cry - to moms abdomen and bulb sx'd - delayed cord clamping / cut per pts mom.  Spontaneous delivery of placenta intact - 20 units pitocin to IVF's F/F with message.  Vaginal inspection - left labial laceration sutured with chrooo sh.   cc    Infant    Findings: male  infant     Infant observations: Weight: 3685 g (8 lb 2 oz)   Length: 20.75  in  Observations/Comments:        Apgars: 8  @ 1 minute /    9  @ 5 minutes   Infant Name: Alejandro     Placenta, Cord, and Fluid    Placenta delivered  Spontaneous  at   10/14/2021  1:26 PM     Cord: 3 vessels  present.   Nuchal Cord?  no   Cord blood obtained: Yes    Cord gases obtained:  No    Cord gas results: Venous:  No results found for: PHCVEN    Arterial:  No results found for: PHCART     Repair    Episiotomy: No        Lacerations:    Estimated Blood Loss: Est. Blood Loss (mL): 200 mL (Filed from Delivery Summary) (10/14/21 1320)           Complications  none    Disposition  Mom and infant bonding - skin to skin contact.  Both stable       Shruti Park CNM  10/14/21  14:28 EDT

## 2021-10-14 NOTE — NURSING NOTE
Patient unable to void @ this time. New pad, panties applied. Rola care discussed and done per patient. Patient back to bed with assistance x1; patient tolerated well.

## 2021-10-14 NOTE — H&P
" [facility]  Pamela Pena  : 2000  MRN: [id number]  CSN: [id number]    Chief Complaint:   Chief Complaint   Patient presents with   • Scheduled Induction     \"I'm here to be induced.\"       History and Physical    Pamela Pena is a 21 y.o. year old  with an Estimated Date of Delivery: 10/13/21 currently at 40w1d presenting for induction of labor for elective term pregnancy.    Induction of labor was discussed in the office - does desires to proceed with induction.     Prenatal care has been with [facility].    Prenatal course has been uncomplicated .    Previous pregnancies  without complications     OB History    Para Term  AB Living   3 2 2 0 0 2   SAB IAB Ectopic Molar Multiple Live Births   0 0 0 0 0 2      # Outcome Date GA Lbr Kritsian/2nd Weight Sex Delivery Anes PTL Lv   3 Current            2 Term 10/13/19 40w0d  3544 g (7 lb 13 oz) F Vag-Spont EPI N MADDIE   1 Term 18 40w4d  3402 g (7 lb 8 oz) M Vag-Spont EPI N MADDIE     Past Medical History:   Diagnosis Date   • Bacterial vaginosis     Doesn't remember date but not during this pregnancy.   • Chlamydia     Doesn't remember date but not during this pregnancy   • Depression     Zoloft in past. Does not take any meds for this now.   • Migraine      History reviewed. No pertinent surgical history.    Review of Systems        Pertinent items are noted in HPI, all other systems reviewed and negative  No Known Allergies  Social History    Tobacco Use      Smoking status: Never Smoker      Smokeless tobacco: Never Used      /62   Pulse 79   Temp 98.2 °F (36.8 °C) (Oral)   Resp 18   Ht 167.6 cm (65.98\")   Wt 88 kg (194 lb)   LMP 2020 (Exact Date)   SpO2 96%   Breastfeeding No   BMI 31.33 kg/m²     Physical Exam       Psych: Altert and oriented to time, place and person  Mood and affect appropriate   General: no acute distress  Musculoskeletal: Normal gait  Full range of motion  Heart: regular rate and " rhythm, no murmur  Lungs:  breathing is unlabored  clear to auscultation bilaterally  Abdomen: Gravid - soft and non-tender   EFW 7 1/2 pounds    Ctx's irregular    + accels and variability   Lower Extremities: Neg edema and DTR's 2+   V/E:2-3/70%-1-0 cephalic  AROM small clear fluid - scant bloody show     Prenatal Labs  Lab Results   Component Value Date    HGB 11.8 (L) 10/13/2021    HEPBSAG Negative 04/23/2021    ABSCRN Negative 10/13/2021    LEC7NFH4 Non Reactive 04/23/2021    HEPCVIRUSABY <0.1 04/23/2021    STREPGPB Negative 09/16/2021    URINECX >100,000 CFU/mL Escherichia coli (A) 11/16/2020    URINECX >100,000 CFU/mL Mixed Shefali Isolated 11/16/2020       Current Labs Reviewed     Collected Updated Procedure Result Status    10/13/2021 2123 10/13/2021 2209 Type & Screen [[id number]]   Blood    Edited Result - FINAL Component Value   ABO Type O   RH type Positive   Antibody Screen Negative   T&S Expiration Date 10/16/2021 11:59:59 PM           10/13/2021 2123 10/13/2021 2128 CBC Auto Differential [[id number]]   (Abnormal)   Blood    Final result Component Value Units   WBC 14.00 High  10*3/mm3   RBC 4.17 10*6/mm3   Hemoglobin 11.8 Low  g/dL   Hematocrit 35.3 %   MCV 84.7 fL   MCH 28.3 pg   MCHC 33.4 g/dL   RDW 16.1 High  %   RDW-SD 47.9 fl   MPV 11.5 fL   Platelets 181 10*3/mm3   Neutrophil % 72.3 %   Lymphocyte % 18.2 Low  %   Monocyte % 6.4 %   Eosinophil % 1.3 %   Basophil % 0.4 %   Immature Grans % 1.4 High  %   Neutrophils, Absolute 10.13 High  10*3/mm3   Lymphocytes, Absolute 2.55 10*3/mm3   Monocytes, Absolute 0.89 10*3/mm3   Eosinophils, Absolute 0.18 10*3/mm3   Basophils, Absolute 0.05 10*3/mm3   Immature Grans, Absolute 0.20 High  10*3/mm3   nRBC 0.0 /100 WBC           10/13/2021 2117 10/13/2021 2159 COVID PRE-OP / PRE-PROCEDURE SCREENING ORDER (NO ISOLATION) - Swab, Nasal Cavity [[id number]]    Swab from Nasal Cavity    Final result Component Value   No component results              10/13/2021  7 10/13/2021 2159 COVID-19,[employer] IN-HOUSE,Nasal Swab No Transport Media 3-4 HR TAT - Swab, Nasal Cavity [[id number]]    Swab from Nasal Cavity    Final result Component Value   COVID19 Not Detected              10/13/2021 2100 10/14/2021 0630 Urine Drug Screen - Urine, Clean Catch [[id number]]    Urine, Clean Catch    Final result Component Value   THC, Screen, Urine Negative   Phencyclidine (PCP), Urine Negative   Cocaine Screen, Urine Negative   Methamphetamine, Ur Negative   Opiate Screen Negative   Amphetamine Screen, Urine Negative   Benzodiazepine Screen, Urine Negative   Tricyclic Antidepressants Screen Negative   Methadone Screen, Urine Negative   Barbiturates Screen, Urine Negative   Oxycodone Screen, Urine Negative   Propoxyphene Screen Negative   Buprenorphine, Screen, Urine Negative               Assessment:  1. IUP with an Estimated Date of Delivery: 10/13/21  2. Induction of labor because of elective at term per patient request  3. Group B strep status: negative  4. FHT's reassuring     Plan:    1.  Admit to  with Pitocin drip - RSO   2.  May have epidural  3.  Anticipate    4.  Encouraged questions and answered     Shruti Park CNM  10/14/2021  08:20 EDT

## 2021-10-14 NOTE — NON STRESS TEST
"  Pamela Pena, a  at 40w0d with an RODGER of 10/13/2021, by Ultrasound, was seen at [facility] for a nonstress test.    Chief Complaint   Patient presents with   • Scheduled Induction     \"I'm here to be induced.\"       Patient Active Problem List   Diagnosis   • Depression with suicidal ideation   • Amphetamine and other psychostimulant dependence, continuous   • Cocaine dependence, unspecified   • Cannabis dependence, unspecified   • Opioid abuse, unspecified   • Pregnancy       Start Time:   Stop Time:              "

## 2021-10-14 NOTE — ANESTHESIA PREPROCEDURE EVALUATION
Anesthesia Evaluation     Patient summary reviewed and Nursing notes reviewed   no history of anesthetic complications:  NPO Solid Status: > 8 hours  NPO Liquid Status: > 8 hours           Airway   Mallampati: I  TM distance: >3 FB  Neck ROM: full  no difficulty expected  Dental - normal exam     Pulmonary - negative pulmonary ROS and normal exam   Cardiovascular - negative cardio ROS and normal exam        Neuro/Psych- negative ROS  GI/Hepatic/Renal/Endo - negative ROS     Musculoskeletal (-) negative ROS    Abdominal    Substance History   (+) drug use     OB/GYN    (+) Pregnant,         Other - negative ROS                       Anesthesia Plan    ASA 3     epidural     intravenous induction     Anesthetic plan, all risks, benefits, and alternatives have been provided, discussed and informed consent has been obtained with: patient.

## 2021-10-14 NOTE — PLAN OF CARE
Problem: Adult Inpatient Plan of Care  Goal: Plan of Care Review  Outcome: Ongoing, Not Progressing  Goal: Patient-Specific Goal (Individualized)  Outcome: Ongoing, Not Progressing  Flowsheets (Taken 10/13/2021 2349)  Individualized Care Needs: denies  Anxieties, Fears or Concerns: denies  Goal: Absence of Hospital-Acquired Illness or Injury  Outcome: Ongoing, Not Progressing  Goal: Optimal Comfort and Wellbeing  Outcome: Ongoing, Not Progressing  Goal: Readiness for Transition of Care  Outcome: Ongoing, Not Progressing  Intervention: Mutually Develop Transition Plan  Recent Flowsheet Documentation  Taken 10/13/2021 2349 by Michael Tamayo, RN  Transportation Anticipated: family or friend will provide  Transportation Concerns: car, none  Patient/Family Anticipated Services at Transition: none  Patient/Family Anticipates Transition to: home with family  Taken 10/13/2021 2347 by Michael Tamayo, RN  Equipment Currently Used at Home: none   Goal Outcome Evaluation:

## 2021-10-15 LAB
BASOPHILS # BLD AUTO: 0.05 10*3/MM3 (ref 0–0.2)
BASOPHILS NFR BLD AUTO: 0.4 % (ref 0–1.5)
DEPRECATED RDW RBC AUTO: 50.3 FL (ref 37–54)
EOSINOPHIL # BLD AUTO: 0.13 10*3/MM3 (ref 0–0.4)
EOSINOPHIL NFR BLD AUTO: 1 % (ref 0.3–6.2)
ERYTHROCYTE [DISTWIDTH] IN BLOOD BY AUTOMATED COUNT: 16.2 % (ref 12.3–15.4)
HCT VFR BLD AUTO: 35.4 % (ref 34–46.6)
HGB BLD-MCNC: 11.4 G/DL (ref 12–15.9)
IMM GRANULOCYTES # BLD AUTO: 0.1 10*3/MM3 (ref 0–0.05)
IMM GRANULOCYTES NFR BLD AUTO: 0.8 % (ref 0–0.5)
LYMPHOCYTES # BLD AUTO: 2.45 10*3/MM3 (ref 0.7–3.1)
LYMPHOCYTES NFR BLD AUTO: 19.6 % (ref 19.6–45.3)
MCH RBC QN AUTO: 27.7 PG (ref 26.6–33)
MCHC RBC AUTO-ENTMCNC: 32.2 G/DL (ref 31.5–35.7)
MCV RBC AUTO: 85.9 FL (ref 79–97)
MONOCYTES # BLD AUTO: 0.75 10*3/MM3 (ref 0.1–0.9)
MONOCYTES NFR BLD AUTO: 6 % (ref 5–12)
NEUTROPHILS NFR BLD AUTO: 72.2 % (ref 42.7–76)
NEUTROPHILS NFR BLD AUTO: 9 10*3/MM3 (ref 1.7–7)
NRBC BLD AUTO-RTO: 0 /100 WBC (ref 0–0.2)
PLATELET # BLD AUTO: 168 10*3/MM3 (ref 140–450)
PMV BLD AUTO: 11.7 FL (ref 6–12)
RBC # BLD AUTO: 4.12 10*6/MM3 (ref 3.77–5.28)
WBC # BLD AUTO: 12.48 10*3/MM3 (ref 3.4–10.8)

## 2021-10-15 PROCEDURE — 0503F POSTPARTUM CARE VISIT: CPT | Performed by: NURSE PRACTITIONER

## 2021-10-15 PROCEDURE — 85025 COMPLETE CBC W/AUTO DIFF WBC: CPT | Performed by: NURSE PRACTITIONER

## 2021-10-15 PROCEDURE — 90471 IMMUNIZATION ADMIN: CPT | Performed by: NURSE PRACTITIONER

## 2021-10-15 PROCEDURE — 90707 MMR VACCINE SC: CPT | Performed by: NURSE PRACTITIONER

## 2021-10-15 PROCEDURE — 25010000002 MEASLES, MUMPS & RUBELLA VAC RECONSTITUTED SOLUTION: Performed by: NURSE PRACTITIONER

## 2021-10-15 RX ADMIN — IBUPROFEN 600 MG: 600 TABLET ORAL at 04:24

## 2021-10-15 RX ADMIN — IBUPROFEN 600 MG: 600 TABLET ORAL at 20:00

## 2021-10-15 RX ADMIN — DOCUSATE SODIUM 100 MG: 100 CAPSULE, LIQUID FILLED ORAL at 20:00

## 2021-10-15 RX ADMIN — PRENATAL VITAMINS-IRON FUMARATE 27 MG IRON-FOLIC ACID 0.8 MG TABLET 1 TABLET: at 08:57

## 2021-10-15 RX ADMIN — MEASLES, MUMPS, AND RUBELLA VIRUS VACCINE LIVE 0.5 ML: 1000; 12500; 1000 INJECTION, POWDER, LYOPHILIZED, FOR SUSPENSION SUBCUTANEOUS at 12:39

## 2021-10-15 RX ADMIN — DOCUSATE SODIUM 100 MG: 100 CAPSULE, LIQUID FILLED ORAL at 08:56

## 2021-10-15 RX ADMIN — HYDROCODONE BITARTRATE AND ACETAMINOPHEN 1 TABLET: 5; 325 TABLET ORAL at 16:26

## 2021-10-15 RX ADMIN — IBUPROFEN 600 MG: 600 TABLET ORAL at 13:59

## 2021-10-15 RX ADMIN — HYDROCODONE BITARTRATE AND ACETAMINOPHEN 1 TABLET: 5; 325 TABLET ORAL at 06:17

## 2021-10-15 NOTE — PROGRESS NOTES
NICAN[facility]    POSTPARTUM DAY 1  Late entry - pt seen earlier today    Subjective     Patient reports:     Reported fearful -  here and may not let baby go home with her  Voiding and ambulating without difficulty.    Reg diet & tolerating.    Lochia not heavy.   Pain meds adequate for pain management       Baby is doing well    Objective      Vitals: Vital Signs Range for the last 24 hours  Temperature: Temp:  [97.7 °F (36.5 °C)-99.3 °F (37.4 °C)] 98.2 °F (36.8 °C)   Temp Source: Temp src: Temporal   BP: BP: ()/(57-90) 110/63   Pulse: Heart Rate:  [73-90] 80   Respirations: Resp:  [16-18] 18   SPO2: SpO2:  [99 %-100 %] 99 %   O2 Amount (l/min):     O2 Devices Device (Oxygen Therapy): room air   Weight:                PHYSICAL EXAM:    General: tearful  anxious  Lungs:  breathing is unlabored  Abdomen: fundus firm - below the umbilicus  Lower Extremities: no calf tenderness                                                                                                                        I reviewed the patient's new clinical results.    @ASSESSMENT PLANBEGIN@       Pregnancy      Assessment:    Pamela Pena is Day 1  post-partum vaginal delivery  Vaginal, Spontaneous   .   Concerns / fear      Plan: Await to see plan from  - support & comfort measures  Continue PP Orders.  Anticipate D/C home tomorrow.    Shruti Park CNM  10/15/2021  19:52 EDT

## 2021-10-15 NOTE — CASE MANAGEMENT/SOCIAL WORK
Case Management/Social Work    Patient Name:  Pamela Pena  YOB: 2000  MRN: [id number]  Admit Date:  10/13/2021    Social Service Consult Due to History of Substance Use    SW met with mother and baby at bedside due to a social service consult regarding a history of substance use. SW explained reason for consult and SW role. New address added to chart. Confirmed phone number as listed in chart. Mother states that baby is her third child. Mother has a two year old and 3 year old that she does not have custody of. Mother is currently working a DCBS prevention plan to get custody of her other children back. Mother reports having an active CPS case at this time due to substance use. Mother was given a urine drug screen on 10/13/21 that was negative for all substances. Baby's umbilical cord has been sent for testing. The family's CPS worker is India Watson and mother gave permission for SW to contact the worker. Mother states that she has been maintaining sobriety since 2020. Mother reports that she went to rehab at [facility] but only stayed for 2 1/2 weeks. Mother did state that she is working a relapse prevention plan. SW informed mother that a CPS report would more than likely have to be made due to not having custody of her other children. Mother was emotional but reported understanding.    Due to mother not having custody of her other children, SW did make another CPS report. The Web referral ID number is [id number]. SW will follow up with report at a later time. SW contacted the family's CPS worker via telephone. Mother's CPS worker stated that they are not sure if they are planning to remove baby or not. Worker stated that they have no plan yet. The worker reported that she has visited the mother's house and it is appropriate for baby. The worker stated that they have no definite plan yet, but plan on having one by the end of the day. The worker states that they are having a formal  S&R meeting to determine plan.  Worker to update SW when a plan has been made.     SW did provide mother with information on HANDS, WIC, and PPD. Mother states that she is aware of the HANDS program and almost participated, but her children were removed before she could start the program. Mother states that she already has WIC and reports an understanding of PPD. SW provided mother with a list of counseling agencies and crisis line number should pt need them. Mother states that she has support at home. Mother reports that FOB will not be involved. SW offered to set pt up with the [facility] program through Behavioral [facility], but pt declined. Pt states that she is knows how to access self help groups and reports no need for information. No other SW needs have been identified at this time. SW to continue to follow and assist as needed.    SW to follow up with status of CPS report at a later time.     13:22 EDT   SW received a call from the pt's CPS worker stating that she will be visiting the pt at the hospital this afternoon. Worker states that they will more than likely be letting mother take baby home at discharge. They are scheduled to have their S&R meeting at 3:00 PM and will know more after the meeting. SW updated pt. SW to continue to follow and assist as needed.    15:53 EDT  SW received a call from pt's CPS worker stating that they are going to allow baby to discharge home with mother. The worker is planning on visiting baby and mother today. SW informed the nurse that a copy of the workers badge and prevention plan will need to be obtained and placed in baby's chart.     Electronically signed by:  GISELLE Nash  10/15/21 15:55 EDT

## 2021-10-15 NOTE — PROGRESS NOTES
"Patient: Pamela Pena  * No surgery found *  Anesthesia type: Anesthesia type cannot be found on the log.    Patient location: Labor and Delivery  Last vitals: /72   Pulse 73   Temp 97.7 °F (36.5 °C) (Oral)   Resp 18   Ht 167.6 cm (65.98\")   Wt 88 kg (194 lb)   LMP 12/20/2020 (Exact Date)   SpO2 100%   Breastfeeding No   BMI 31.33 kg/m²   Level of consciousness: awake, alert and oriented    Post-anesthesia pain: adequate analgesia  Airway patency: patent  Respiratory: unassisted  Cardiovascular: stable and blood pressure at baseline  Hydration: euvolemic    Anesthetic complications: no     "

## 2021-10-16 VITALS
RESPIRATION RATE: 16 BRPM | BODY MASS INDEX: 31.18 KG/M2 | HEART RATE: 81 BPM | TEMPERATURE: 98.8 F | HEIGHT: 66 IN | SYSTOLIC BLOOD PRESSURE: 117 MMHG | OXYGEN SATURATION: 99 % | WEIGHT: 194 LBS | DIASTOLIC BLOOD PRESSURE: 70 MMHG

## 2021-10-16 PROCEDURE — 0503F POSTPARTUM CARE VISIT: CPT | Performed by: NURSE PRACTITIONER

## 2021-10-16 RX ORDER — DOCUSATE SODIUM 100 MG/1
100 CAPSULE, LIQUID FILLED ORAL 2 TIMES DAILY
Qty: 60 CAPSULE | Refills: 0 | Status: SHIPPED | OUTPATIENT
Start: 2021-10-16 | End: 2022-07-19

## 2021-10-16 RX ORDER — IBUPROFEN 800 MG/1
800 TABLET ORAL EVERY 8 HOURS PRN
Qty: 40 TABLET | Refills: 0 | Status: SHIPPED | OUTPATIENT
Start: 2021-10-16 | End: 2022-07-19

## 2021-10-16 RX ORDER — ACETAMINOPHEN 500 MG
1000 TABLET ORAL EVERY 8 HOURS
Qty: 60 TABLET | Refills: 0 | Status: SHIPPED | OUTPATIENT
Start: 2021-10-16 | End: 2022-07-19

## 2021-10-16 RX ORDER — FERROUS SULFATE 325(65) MG
325 TABLET ORAL
Qty: 60 TABLET | Refills: 0 | Status: SHIPPED | OUTPATIENT
Start: 2021-10-16 | End: 2021-10-16 | Stop reason: HOSPADM

## 2021-10-16 RX ADMIN — IBUPROFEN 600 MG: 600 TABLET ORAL at 11:32

## 2021-10-16 RX ADMIN — HYDROCODONE BITARTRATE AND ACETAMINOPHEN 1 TABLET: 5; 325 TABLET ORAL at 11:32

## 2021-10-16 RX ADMIN — PRENATAL VITAMINS-IRON FUMARATE 27 MG IRON-FOLIC ACID 0.8 MG TABLET 1 TABLET: at 08:00

## 2021-10-16 RX ADMIN — DOCUSATE SODIUM 100 MG: 100 CAPSULE, LIQUID FILLED ORAL at 08:00

## 2021-10-16 RX ADMIN — HYDROCODONE BITARTRATE AND ACETAMINOPHEN 1 TABLET: 5; 325 TABLET ORAL at 05:00

## 2021-10-16 RX ADMIN — IBUPROFEN 600 MG: 600 TABLET ORAL at 05:00

## 2021-10-16 NOTE — PLAN OF CARE
Goal Outcome Evaluation:  Plan of Care Reviewed With: patient        Progress: improving  Outcome Summary: Patient condition continues to improve and transitioned appropriately for discharge to home.

## 2021-10-16 NOTE — DISCHARGE SUMMARY
Discharge Summary     Yaniv Pena  : 2000  MRN: [id number]  CSN: [id number]    Date of Admission: 10/13/2021   Date of Discharge:  10/16/2021   Delivering Physician: Shruti Park        Admission Diagnosis: Pregnancy [Z34.90]   Discharge Diagnosis: 1. Same as above plus  2. Pregnancy at 40w1d - delivered       Procedures: 10/14/2021  - Vaginal, Spontaneous       Hospital Course  Patient is a 21 y.o.  who at 40w1d had a vaginal birth.    Her postpartum course was without complications.    She reports she is doing well.    She is tolerating a regular diet.  Reports ambulating, voiding and passing gas.   She had normal lochia.  Pain/cramps well controlled with oral medications.    She reports baby will be d/c'd home with her.  She is relieved and plans to continue all counseling & recommendations DCBS.    She does not have any depression.    Baby is doing well        Infant  male  fetus weighing 3685 g (8 lb 2 oz)   Apgars -  8 @ 1 minute /  9 @ 5 minutes.    Discharge labs  Lab Results   Component Value Date    WBC 12.48 (H) 10/15/2021    HGB 11.4 (L) 10/15/2021    HCT 35.4 10/15/2021     10/15/2021       Discharge Medications     Discharge Medications      New Medications      Instructions Start Date   acetaminophen 500 MG tablet  Commonly known as: TYLENOL   1,000 mg, Oral, Every 8 Hours, Alternate with ibuprofen      docusate sodium 100 MG capsule  Commonly known as: Colace   100 mg, Oral, 2 Times Daily      ibuprofen 800 MG tablet  Commonly known as: ADVIL,MOTRIN   800 mg, Oral, Every 8 Hours PRN         Changes to Medications      Instructions Start Date   FeroSul 325 (65 FE) MG tablet  Generic drug: ferrous sulfate  What changed: Another medication with the same name was added. Make sure you understand how and when to take each.   325 mg, Oral, 2 Times Daily Before Meals      ferrous sulfate 325 (65 FE) MG tablet  What changed: You were already taking a medication with  the same name, and this prescription was added. Make sure you understand how and when to take each.   325 mg, Oral, 2 Times Daily Before Meals         Stop These Medications    famotidine 20 MG tablet  Commonly known as: Pepcid     ondansetron ODT 4 MG disintegrating tablet  Commonly known as: ZOFRAN-ODT            Discharge Disposition Home or Self Care   Condition on Discharge: good   Follow-up: 6 weeks  At [facility] office      Shruti Park CNM  10/16/2021

## 2021-10-16 NOTE — PLAN OF CARE
Goal Outcome Evaluation:  Plan of Care Reviewed With: patient        Progress: improving  Outcome Summary: pt has ambulated, tolerated po, pain controlled, met with ss. lochia,vs,labs are wnl.providing care and bonding well with infant.

## 2021-12-07 ENCOUNTER — TELEPHONE (OUTPATIENT)
Dept: OBSTETRICS AND GYNECOLOGY | Facility: CLINIC | Age: 21
End: 2021-12-07

## 2021-12-07 RX ORDER — NORETHINDRONE ACETATE AND ETHINYL ESTRADIOL 1MG-20(21)
1 KIT ORAL DAILY
Qty: 28 TABLET | Refills: 1 | Status: SHIPPED | OUTPATIENT
Start: 2021-12-07 | End: 2022-01-25 | Stop reason: SDUPTHER

## 2021-12-07 NOTE — TELEPHONE ENCOUNTER
----- Message from Judith Sanz MA sent at 12/7/2021  1:30 PM EST -----  Patient had an appointment for 12/9/2021 that had to be R/S. She is requesting birth control until she can get it for her jose on 12/21/2021. She is currently on her period.          Jonathan's Patient      Thank You     no chest pain and no edema.

## 2022-01-25 ENCOUNTER — OFFICE VISIT (OUTPATIENT)
Dept: OBSTETRICS AND GYNECOLOGY | Facility: CLINIC | Age: 22
End: 2022-01-25

## 2022-01-25 VITALS
HEIGHT: 66 IN | WEIGHT: 170 LBS | DIASTOLIC BLOOD PRESSURE: 68 MMHG | SYSTOLIC BLOOD PRESSURE: 118 MMHG | BODY MASS INDEX: 27.32 KG/M2

## 2022-01-25 DIAGNOSIS — N89.8 VAGINAL DISCHARGE: ICD-10-CM

## 2022-01-25 DIAGNOSIS — Z30.09 ENCOUNTER FOR OTHER GENERAL COUNSELING OR ADVICE ON CONTRACEPTION: Primary | ICD-10-CM

## 2022-01-25 PROCEDURE — 99213 OFFICE O/P EST LOW 20 MIN: CPT | Performed by: OBSTETRICS & GYNECOLOGY

## 2022-01-25 RX ORDER — NORETHINDRONE ACETATE AND ETHINYL ESTRADIOL 1MG-20(21)
1 KIT ORAL DAILY
Qty: 28 TABLET | Refills: 1 | Status: SHIPPED | OUTPATIENT
Start: 2022-01-25 | End: 2022-03-15 | Stop reason: ALTCHOICE

## 2022-01-25 RX ORDER — METRONIDAZOLE 500 MG/1
500 TABLET ORAL 2 TIMES DAILY
Qty: 14 TABLET | Refills: 0 | Status: SHIPPED | OUTPATIENT
Start: 2022-01-25 | End: 2022-02-01

## 2022-01-25 NOTE — PROGRESS NOTES
Subjective   Chief Complaint   Patient presents with   • Gynecologic Exam     Patient complains of lots of discharged burning and itching  for past 4 days      Pamela Pena is a 22 y.o. year old .  Patient's last menstrual period was 2022.  She presents to be seen because of vaginal discharge for 3 to 4 days. Also treated Nexplanon..     OTHER COMPLAINTS:  Nothing else    The following portions of the patient's history were reviewed and updated as appropriate:  She  has a past medical history of Bacterial vaginosis, Chlamydia, Depression, and Migraine.  She does not have any pertinent problems on file.  She  has no past surgical history on file.  Her family history includes Diabetes in her mother.  She  reports that she has never smoked. She has never used smokeless tobacco. She reports that she does not drink alcohol and does not use drugs.  Current Outpatient Medications   Medication Sig Dispense Refill   • acetaminophen (TYLENOL) 500 MG tablet Take 2 tablets by mouth Every 8 (Eight) Hours. Alternate with ibuprofen 60 tablet 0   • docusate sodium (Colace) 100 MG capsule Take 1 capsule by mouth 2 (Two) Times a Day. 60 capsule 0   • ferrous sulfate 325 (65 FE) MG tablet Take 1 tablet by mouth 2 (Two) Times a Day Before Meals. 60 tablet 10   • ibuprofen (ADVIL,MOTRIN) 800 MG tablet Take 1 tablet by mouth Every 8 (Eight) Hours As Needed for Mild Pain . 40 tablet 0   • norethindrone-ethinyl estradiol FE (Junel FE ) 1-20 MG-MCG per tablet Take 1 tablet by mouth Daily. 28 tablet 1     No current facility-administered medications for this visit.     Current Outpatient Medications on File Prior to Visit   Medication Sig   • acetaminophen (TYLENOL) 500 MG tablet Take 2 tablets by mouth Every 8 (Eight) Hours. Alternate with ibuprofen   • docusate sodium (Colace) 100 MG capsule Take 1 capsule by mouth 2 (Two) Times a Day.   • ferrous sulfate 325 (65 FE) MG tablet Take 1 tablet by mouth 2 (Two) Times a Day  "Before Meals.   • ibuprofen (ADVIL,MOTRIN) 800 MG tablet Take 1 tablet by mouth Every 8 (Eight) Hours As Needed for Mild Pain .   • norethindrone-ethinyl estradiol FE (Junel FE 1/20) 1-20 MG-MCG per tablet Take 1 tablet by mouth Daily.     No current facility-administered medications on file prior to visit.     She has No Known Allergies.    Social History    Tobacco Use      Smoking status: Never Smoker      Smokeless tobacco: Never Used    Review of Systems  Consitutional POS: nothing reported    NEG: anorexia or night sweats   Gastointestinal POS: nothing reported    NEG: bloating, change in bowel habits, melena or reflux symptoms   Genitourinary POS: nothing reported    NEG: dysuria or hematuria   Integument POS: nothing reported    NEG: moles that are changing in size, shape, color or rashes   Breast POS: nothing reported    NEG: persistent breast lump, skin dimpling or nipple discharge         Respiratory: negative  Cardiovascular: negative          Objective   /68   Ht 167.6 cm (66\")   Wt 77.1 kg (170 lb)   LMP 01/06/2022   Breastfeeding No   BMI 27.44 kg/m²     General:  well developed; well nourished  no acute distress   Skin:  No suspicious lesions seen   Thyroid: not examined   Lungs:  breathing is unlabored   Heart:  Not performed.   Breasts:  Not performed.   Abdomen: soft, non-tender; no masses  no umbilical or inguinal hernias are present  no hepato-splenomegaly   Pelvis: Clinical staff was present for exam  External genitalia:  normal appearance of the external genitalia including Bartholin's and [place]'s glands.  :  urethral meatus normal;  Vaginal:  normal pink mucosa without prolapse or lesions. discharge present -  yellow, white and thick;  Cervix:  normal appearance.  Uterus:  normal size, shape and consistency.  Adnexa:  normal bimanual exam of the adnexa.  Rectal:  digital rectal exam not performed; anus visually normal appearing.     Psychiatric: Alert and oriented ×3, mood and " affect appropriate  HEENT: Atraumatic, normocephalic, normal scleral icterus  Extremities: 2+ pulses bilaterally, no edema      Lab Review   No data reviewed    Imaging   No data reviewed        Assessment   1. Vaginal discharge: 3 to 4 days. Did have a change in hygiene products though no recent antibiotics  2. Contraception: Patient interested in Nexplanon she had bad experience with the IUDs in the past     Plan   1. Vaginal cultures taken  Nexplanon has been ordered. Will start patient on birth control pill for now just as the bridging. Flagyl twice daily has been sent in for 7 days.  No orders of the defined types were placed in this encounter.         This note was electronically signed.      January 25, 2022

## 2022-01-28 LAB
A VAGINAE DNA VAG QL NAA+PROBE: ABNORMAL SCORE
BVAB2 DNA VAG QL NAA+PROBE: ABNORMAL SCORE
C ALBICANS DNA VAG QL NAA+PROBE: POSITIVE
C GLABRATA DNA VAG QL NAA+PROBE: POSITIVE
C TRACH DNA VAG QL NAA+PROBE: NEGATIVE
MEGA1 DNA VAG QL NAA+PROBE: ABNORMAL SCORE
N GONORRHOEA DNA VAG QL NAA+PROBE: NEGATIVE
T VAGINALIS DNA VAG QL NAA+PROBE: NEGATIVE

## 2022-01-28 RX ORDER — FLUCONAZOLE 150 MG/1
TABLET ORAL
Qty: 3 TABLET | Refills: 0 | Status: SHIPPED | OUTPATIENT
Start: 2022-01-28 | End: 2022-03-15

## 2022-01-28 NOTE — PROGRESS NOTES
Please let patient know the yeast was noted on the cultures.  No other infection.  Diflucan has been sent to the pharmacy.  Thank you

## 2022-02-22 ENCOUNTER — TELEPHONE (OUTPATIENT)
Dept: OBSTETRICS AND GYNECOLOGY | Facility: CLINIC | Age: 22
End: 2022-02-22

## 2022-02-22 RX ORDER — METRONIDAZOLE 500 MG/1
500 TABLET ORAL 2 TIMES DAILY
Qty: 14 TABLET | Refills: 0 | Status: SHIPPED | OUTPATIENT
Start: 2022-02-22 | End: 2022-03-01

## 2022-02-22 NOTE — TELEPHONE ENCOUNTER
----- Message from Judith aSnz MA sent at 2/22/2022  8:42 AM EST -----  Patient was prescribed Diflucan for yeast infection, finished it 3 days ago, symptoms have not improved and now she has an odor. Would like to know if she need's to come in for a visit or if she can get another RX.    Dr Ridley's Patient       [facility]       Thank You

## 2022-03-15 ENCOUNTER — OFFICE VISIT (OUTPATIENT)
Dept: OBSTETRICS AND GYNECOLOGY | Facility: CLINIC | Age: 22
End: 2022-03-15

## 2022-03-15 VITALS
BODY MASS INDEX: 27.35 KG/M2 | HEIGHT: 66 IN | DIASTOLIC BLOOD PRESSURE: 70 MMHG | SYSTOLIC BLOOD PRESSURE: 102 MMHG | WEIGHT: 170.2 LBS

## 2022-03-15 DIAGNOSIS — N89.8 VAGINAL DISCHARGE: Primary | ICD-10-CM

## 2022-03-15 DIAGNOSIS — Z30.013 ENCOUNTER FOR INITIAL PRESCRIPTION OF INJECTABLE CONTRACEPTIVE: ICD-10-CM

## 2022-03-15 DIAGNOSIS — N89.8 VAGINAL ODOR: ICD-10-CM

## 2022-03-15 PROCEDURE — 99213 OFFICE O/P EST LOW 20 MIN: CPT | Performed by: PHYSICIAN ASSISTANT

## 2022-03-15 RX ORDER — MEDROXYPROGESTERONE ACETATE 150 MG/ML
150 INJECTION, SUSPENSION INTRAMUSCULAR
Qty: 1 ML | Refills: 3 | Status: SHIPPED | OUTPATIENT
Start: 2022-03-15 | End: 2022-07-19

## 2022-03-15 RX ORDER — METRONIDAZOLE 500 MG/1
500 TABLET ORAL 2 TIMES DAILY
Qty: 14 TABLET | Refills: 0 | Status: SHIPPED | OUTPATIENT
Start: 2022-03-15 | End: 2022-03-22

## 2022-03-15 NOTE — PROGRESS NOTES
Subjective   Chief Complaint   Patient presents with   • Vaginal Discharge     Patient is C/O vaginal discharge with odor   • Contraception     Patient would like to discuss other birth control options       Pamela Pena is a 22 y.o. year old  presenting to be seen for complaint of vaginal discharge and odor.  She has been treated for yeast infection in the past few months and she was having some vaginal itching also but she used miconazole cream over-the-counter 2 days ago when the itching stopped.  She is still having a heavy discharge with a fishy odor.  She denies sexual activity recently.  Her LMP was 3/6/2022.  She is currently using Junel OCPs but is wanting a more convenient birth control and would like to start Depo-Provera.    Past Medical History:   Diagnosis Date   • Bacterial vaginosis     Doesn't remember date but not during this pregnancy.   • Chlamydia     Doesn't remember date but not during this pregnancy   • Depression     Zoloft in past. Does not take any meds for this now.   • Migraine         Current Outpatient Medications:   •  acetaminophen (TYLENOL) 500 MG tablet, Take 2 tablets by mouth Every 8 (Eight) Hours. Alternate with ibuprofen, Disp: 60 tablet, Rfl: 0  •  docusate sodium (Colace) 100 MG capsule, Take 1 capsule by mouth 2 (Two) Times a Day., Disp: 60 capsule, Rfl: 0  •  ibuprofen (ADVIL,MOTRIN) 800 MG tablet, Take 1 tablet by mouth Every 8 (Eight) Hours As Needed for Mild Pain ., Disp: 40 tablet, Rfl: 0  •  ferrous sulfate 325 (65 FE) MG tablet, Take 1 tablet by mouth 2 (Two) Times a Day Before Meals., Disp: 60 tablet, Rfl: 10  •  medroxyPROGESTERone (Depo-Provera) 150 MG/ML injection, Inject 1 mL into the appropriate muscle as directed by prescriber Every 3 (Three) Months., Disp: 1 mL, Rfl: 3  •  metroNIDAZOLE (Flagyl) 500 MG tablet, Take 1 tablet by mouth 2 (Two) Times a Day for 7 days., Disp: 14 tablet, Rfl: 0  •  terconazole (Terazol 7) 0.4 % vaginal cream, Insert 1  "applicator into the vagina Every Night for 7 nights, Disp: 45 g, Rfl: 0  No current facility-administered medications for this visit.   No Known Allergies   History reviewed. No pertinent surgical history.   Social History     Socioeconomic History   • Marital status:    Tobacco Use   • Smoking status: Never Smoker   • Smokeless tobacco: Never Used   Vaping Use   • Vaping Use: Never used   Substance and Sexual Activity   • Alcohol use: No   • Drug use: No   • Sexual activity: Yes     Partners: Male     Birth control/protection: None      Family History   Problem Relation Age of Onset   • Diabetes Mother        Review of Systems   Constitutional: Negative for chills, diaphoresis and fever.   Gastrointestinal: Negative.    Genitourinary: Positive for vaginal discharge. Negative for difficulty urinating and dysuria.           Objective   /70   Ht 167.6 cm (66\")   Wt 77.2 kg (170 lb 3.2 oz)   LMP 03/06/2022 (Exact Date)   Breastfeeding No   BMI 27.47 kg/m²     Physical Exam  Constitutional:       Appearance: Normal appearance. She is well-developed and well-groomed.   Eyes:      General: Lids are normal.      Extraocular Movements: Extraocular movements intact.      Conjunctiva/sclera: Conjunctivae normal.   Genitourinary:     Labia:         Right: No rash, tenderness or lesion.         Left: No rash, tenderness or lesion.       Urethra: No prolapse, urethral pain, urethral swelling or urethral lesion.      Vagina: Vaginal discharge present. No erythema, tenderness or lesions.      Cervix: No cervical motion tenderness, discharge, friability, lesion or cervical bleeding.      Uterus: Not enlarged and not tender.       Adnexa:         Right: No mass or tenderness.          Left: No mass or tenderness.        Comments: Fishy odor  Frothy yellow gray discharge  Neurological:      Mental Status: She is alert.   Psychiatric:         Attention and Perception: Attention normal.         Mood and Affect: Mood " normal.         Speech: Speech normal.         Behavior: Behavior is cooperative.            Result Review :                   Assessment and Plan  Diagnoses and all orders for this visit:    1. Vaginal discharge (Primary)  -     NuSwab VG+ - Swab, Vagina    2. Vaginal odor  -     NuSwab VG+ - Swab, Vagina    3. Encounter for initial prescription of injectable contraceptive    Other orders  -     terconazole (Terazol 7) 0.4 % vaginal cream; Insert 1 applicator into the vagina Every Night for 7 nights  Dispense: 45 g; Refill: 0  -     metroNIDAZOLE (Flagyl) 500 MG tablet; Take 1 tablet by mouth 2 (Two) Times a Day for 7 days.  Dispense: 14 tablet; Refill: 0  -     medroxyPROGESTERone (Depo-Provera) 150 MG/ML injection; Inject 1 mL into the appropriate muscle as directed by prescriber Every 3 (Three) Months.  Dispense: 1 mL; Refill: 3      Patient Instructions   Call first day of menses to schedule DMPA injection             This note was electronically signed.    Yael Vivas PA-C   March 15, 2022

## 2022-07-19 ENCOUNTER — HOSPITAL ENCOUNTER (EMERGENCY)
Facility: HOSPITAL | Age: 22
Discharge: HOME | End: 2022-07-19
Attending: EMERGENCY MEDICINE | Admitting: EMERGENCY MEDICINE

## 2022-07-19 VITALS
BODY MASS INDEX: 24.11 KG/M2 | DIASTOLIC BLOOD PRESSURE: 81 MMHG | SYSTOLIC BLOOD PRESSURE: 116 MMHG | RESPIRATION RATE: 18 BRPM | TEMPERATURE: 99.2 F | OXYGEN SATURATION: 100 % | WEIGHT: 150 LBS | HEIGHT: 66 IN | HEART RATE: 105 BPM

## 2022-07-19 DIAGNOSIS — A64 SEXUALLY TRANSMITTED DISEASE (STD): ICD-10-CM

## 2022-07-19 DIAGNOSIS — L02.413 ABSCESS OF ARM, RIGHT: Primary | ICD-10-CM

## 2022-07-19 DIAGNOSIS — N30.00 ACUTE CYSTITIS WITHOUT HEMATURIA: ICD-10-CM

## 2022-07-19 LAB
ALBUMIN SERPL-MCNC: 4.6 G/DL (ref 3.5–5.2)
ALBUMIN/GLOB SERPL: 1.6 G/DL
ALP SERPL-CCNC: 91 U/L (ref 39–117)
ALT SERPL W P-5'-P-CCNC: 18 U/L (ref 1–33)
ANION GAP SERPL CALCULATED.3IONS-SCNC: 15.4 MMOL/L (ref 5–15)
AST SERPL-CCNC: 19 U/L (ref 1–32)
BACTERIA UR QL AUTO: ABNORMAL /HPF
BASOPHILS # BLD AUTO: 0.06 10*3/MM3 (ref 0–0.2)
BASOPHILS NFR BLD AUTO: 0.7 % (ref 0–1.5)
BILIRUB SERPL-MCNC: 0.6 MG/DL (ref 0–1.2)
BILIRUB UR QL STRIP: NEGATIVE
BUN SERPL-MCNC: 7 MG/DL (ref 6–20)
BUN/CREAT SERPL: 11.7 (ref 7–25)
CALCIUM SPEC-SCNC: 9.7 MG/DL (ref 8.6–10.5)
CHLORIDE SERPL-SCNC: 100 MMOL/L (ref 98–107)
CLARITY UR: ABNORMAL
CO2 SERPL-SCNC: 22.6 MMOL/L (ref 22–29)
COLOR UR: YELLOW
CREAT SERPL-MCNC: 0.6 MG/DL (ref 0.57–1)
DEPRECATED RDW RBC AUTO: 38.5 FL (ref 37–54)
EGFRCR SERPLBLD CKD-EPI 2021: 128.7 ML/MIN/1.73
EOSINOPHIL # BLD AUTO: 0.22 10*3/MM3 (ref 0–0.4)
EOSINOPHIL NFR BLD AUTO: 2.7 % (ref 0.3–6.2)
ERYTHROCYTE [DISTWIDTH] IN BLOOD BY AUTOMATED COUNT: 12.7 % (ref 12.3–15.4)
GLOBULIN UR ELPH-MCNC: 2.9 GM/DL
GLUCOSE SERPL-MCNC: 80 MG/DL (ref 65–99)
GLUCOSE UR STRIP-MCNC: NEGATIVE MG/DL
HCT VFR BLD AUTO: 39.9 % (ref 34–46.6)
HGB BLD-MCNC: 13.6 G/DL (ref 12–15.9)
HGB UR QL STRIP.AUTO: ABNORMAL
HIV1 P24 AG SER QL: NORMAL
HIV1+2 AB SER QL: NORMAL
HYALINE CASTS UR QL AUTO: ABNORMAL /LPF
IMM GRANULOCYTES # BLD AUTO: 0.02 10*3/MM3 (ref 0–0.05)
IMM GRANULOCYTES NFR BLD AUTO: 0.2 % (ref 0–0.5)
KETONES UR QL STRIP: ABNORMAL
LEUKOCYTE ESTERASE UR QL STRIP.AUTO: ABNORMAL
LYMPHOCYTES # BLD AUTO: 2.11 10*3/MM3 (ref 0.7–3.1)
LYMPHOCYTES NFR BLD AUTO: 25.6 % (ref 19.6–45.3)
MCH RBC QN AUTO: 28.4 PG (ref 26.6–33)
MCHC RBC AUTO-ENTMCNC: 34.1 G/DL (ref 31.5–35.7)
MCV RBC AUTO: 83.3 FL (ref 79–97)
MONOCYTES # BLD AUTO: 0.74 10*3/MM3 (ref 0.1–0.9)
MONOCYTES NFR BLD AUTO: 9 % (ref 5–12)
NEUTROPHILS NFR BLD AUTO: 5.09 10*3/MM3 (ref 1.7–7)
NEUTROPHILS NFR BLD AUTO: 61.8 % (ref 42.7–76)
NITRITE UR QL STRIP: POSITIVE
NRBC BLD AUTO-RTO: 0 /100 WBC (ref 0–0.2)
PH UR STRIP.AUTO: 6 [PH] (ref 5–8)
PLATELET # BLD AUTO: 327 10*3/MM3 (ref 140–450)
PMV BLD AUTO: 10.3 FL (ref 6–12)
POTASSIUM SERPL-SCNC: 3.7 MMOL/L (ref 3.5–5.2)
PROT SERPL-MCNC: 7.5 G/DL (ref 6–8.5)
PROT UR QL STRIP: ABNORMAL
RBC # BLD AUTO: 4.79 10*6/MM3 (ref 3.77–5.28)
RBC # UR STRIP: ABNORMAL /HPF
REF LAB TEST METHOD: ABNORMAL
SODIUM SERPL-SCNC: 138 MMOL/L (ref 136–145)
SP GR UR STRIP: 1.01 (ref 1–1.03)
SQUAMOUS #/AREA URNS HPF: ABNORMAL /HPF
UROBILINOGEN UR QL STRIP: ABNORMAL
WBC # UR STRIP: ABNORMAL /HPF
WBC NRBC COR # BLD: 8.24 10*3/MM3 (ref 3.4–10.8)

## 2022-07-19 PROCEDURE — 96372 THER/PROPH/DIAG INJ SC/IM: CPT

## 2022-07-19 PROCEDURE — 25010000002 CEFTRIAXONE PER 250 MG: Performed by: NURSE PRACTITIONER

## 2022-07-19 PROCEDURE — 87205 SMEAR GRAM STAIN: CPT | Performed by: NURSE PRACTITIONER

## 2022-07-19 PROCEDURE — 87591 N.GONORRHOEAE DNA AMP PROB: CPT | Performed by: NURSE PRACTITIONER

## 2022-07-19 PROCEDURE — 80053 COMPREHEN METABOLIC PANEL: CPT | Performed by: NURSE PRACTITIONER

## 2022-07-19 PROCEDURE — 36415 COLL VENOUS BLD VENIPUNCTURE: CPT

## 2022-07-19 PROCEDURE — 85025 COMPLETE CBC W/AUTO DIFF WBC: CPT | Performed by: NURSE PRACTITIONER

## 2022-07-19 PROCEDURE — 80074 ACUTE HEPATITIS PANEL: CPT | Performed by: NURSE PRACTITIONER

## 2022-07-19 PROCEDURE — 87186 SC STD MICRODIL/AGAR DIL: CPT | Performed by: NURSE PRACTITIONER

## 2022-07-19 PROCEDURE — 86592 SYPHILIS TEST NON-TREP QUAL: CPT | Performed by: NURSE PRACTITIONER

## 2022-07-19 PROCEDURE — 86696 HERPES SIMPLEX TYPE 2 TEST: CPT | Performed by: NURSE PRACTITIONER

## 2022-07-19 PROCEDURE — 87147 CULTURE TYPE IMMUNOLOGIC: CPT | Performed by: NURSE PRACTITIONER

## 2022-07-19 PROCEDURE — 87491 CHLMYD TRACH DNA AMP PROBE: CPT | Performed by: NURSE PRACTITIONER

## 2022-07-19 PROCEDURE — G0475 HIV COMBINATION ASSAY: HCPCS | Performed by: NURSE PRACTITIONER

## 2022-07-19 PROCEDURE — 87070 CULTURE OTHR SPECIMN AEROBIC: CPT | Performed by: NURSE PRACTITIONER

## 2022-07-19 PROCEDURE — 99283 EMERGENCY DEPT VISIT LOW MDM: CPT

## 2022-07-19 PROCEDURE — 86695 HERPES SIMPLEX TYPE 1 TEST: CPT | Performed by: NURSE PRACTITIONER

## 2022-07-19 PROCEDURE — 81001 URINALYSIS AUTO W/SCOPE: CPT | Performed by: NURSE PRACTITIONER

## 2022-07-19 RX ORDER — CEFDINIR 300 MG/1
300 CAPSULE ORAL 2 TIMES DAILY
Qty: 20 CAPSULE | Refills: 0 | OUTPATIENT
Start: 2022-07-19 | End: 2022-07-23

## 2022-07-19 RX ORDER — METRONIDAZOLE 500 MG/1
2000 TABLET ORAL ONCE
Status: COMPLETED | OUTPATIENT
Start: 2022-07-19 | End: 2022-07-19

## 2022-07-19 RX ORDER — AZITHROMYCIN 250 MG/1
1000 TABLET, FILM COATED ORAL ONCE
Status: COMPLETED | OUTPATIENT
Start: 2022-07-19 | End: 2022-07-19

## 2022-07-19 RX ADMIN — LIDOCAINE HYDROCHLORIDE 490 MG: 10 INJECTION, SOLUTION EPIDURAL; INFILTRATION; INTRACAUDAL; PERINEURAL at 14:17

## 2022-07-19 RX ADMIN — METRONIDAZOLE 2000 MG: 500 TABLET ORAL at 14:03

## 2022-07-19 RX ADMIN — AZITHROMYCIN MONOHYDRATE 1000 MG: 250 TABLET ORAL at 14:03

## 2022-07-19 NOTE — DISCHARGE INSTRUCTIONS
You have been treated for gc/chlamydia. We have sent labs off for herpes and chlamydia which do not return today. We will call with those results. HIV was negative. Avoid sexual contact. All partners need to be treated as well.

## 2022-07-19 NOTE — ED PROVIDER NOTES
Subjective   22-year-old female presents to the ED today for complaint of sores in her mouth and on her tongue.  She states that she wants to be tested for STDs.  She denies any vaginal symptoms but has these blisters in her mouth and on her tongue.  She also has a open area on her right AC where she injected IV meth and meth.  She says it started draining yesterday and is very painful.  She states that she has been draining the area is much as she can.  She denies any nausea, vomiting or diarrhea.  She does have a 99.2 temp at this time.  Otherwise no symptoms noted.          Review of Systems   Constitutional: Negative.    HENT: Positive for mouth sores.    Eyes: Negative.    Respiratory: Negative.    Cardiovascular: Negative.    Gastrointestinal: Negative.    Endocrine: Negative.    Genitourinary: Negative.    Musculoskeletal: Negative.    Skin: Positive for wound.   Allergic/Immunologic: Negative.    Neurological: Negative.    Hematological: Negative.    Psychiatric/Behavioral: Negative.        Past Medical History:   Diagnosis Date   • Anxiety    • Bacterial vaginosis     Doesn't remember date but not during this pregnancy.   • Chlamydia     Doesn't remember date but not during this pregnancy   • Depression     Zoloft in past. Does not take any meds for this now.   • Migraine        No Known Allergies    History reviewed. No pertinent surgical history.    Family History   Problem Relation Age of Onset   • Diabetes Mother        Social History     Socioeconomic History   • Marital status:    Tobacco Use   • Smoking status: Never Smoker   • Smokeless tobacco: Never Used   Vaping Use   • Vaping Use: Never used   Substance and Sexual Activity   • Alcohol use: No   • Drug use: Yes     Frequency: 2.0 times per week     Types: Methamphetamines   • Sexual activity: Yes     Partners: Male     Birth control/protection: None           Objective   Physical Exam  Vitals and nursing note reviewed.   HENT:      Head:  Normocephalic and atraumatic.      Nose: Nose normal.      Mouth/Throat:      Comments: Patient with sores on her tongue and in her mouth  Eyes:      Extraocular Movements: Extraocular movements intact.      Conjunctiva/sclera: Conjunctivae normal.      Pupils: Pupils are equal, round, and reactive to light.   Cardiovascular:      Pulses: Normal pulses.   Pulmonary:      Effort: Pulmonary effort is normal.   Abdominal:      General: Abdomen is flat. Bowel sounds are normal.   Musculoskeletal:         General: Normal range of motion.   Skin:     General: Skin is warm and dry.      Capillary Refill: Capillary refill takes less than 2 seconds.   Neurological:      Mental Status: She is alert and oriented to person, place, and time.   Psychiatric:         Mood and Affect: Mood normal.         Procedures           ED Course                                           MDM    Final diagnoses:   Abscess of arm, right   Sexually transmitted disease (STD)   Acute cystitis without hematuria       ED Disposition  ED Disposition     ED Disposition   Discharge    Condition   Stable    Comment   --             [address] Yaniv Singh [address] [phone]    As needed, If symptoms worsen         Medication List      New Prescriptions    cefdinir 300 MG capsule  Commonly known as: OMNICEF  Take 1 capsule by mouth 2 (Two) Times a Day.           Where to Get Your Medications      These medications were sent to [facility] - [address] [address]    Hours: 9AM-6PM Mon-Fri Phone: [phone]   · cefdinir 300 MG capsule          Romana Mcallister, APRN  07/19/22 [id number]

## 2022-07-20 LAB
C TRACH RRNA SPEC QL NAA+PROBE: NEGATIVE
HAV IGM SERPL QL IA: NORMAL
HBV CORE IGM SERPL QL IA: NORMAL
HBV SURFACE AG SERPL QL IA: NORMAL
HCV AB SER DONR QL: NORMAL
HSV1 IGG SER IA-ACNC: 51.3 INDEX (ref 0–0.9)
HSV2 IGG SER IA-ACNC: <0.91 INDEX (ref 0–0.9)
N GONORRHOEA RRNA SPEC QL NAA+PROBE: POSITIVE
RPR SER QL: NORMAL

## 2022-07-22 LAB
BACTERIA SPEC AEROBE CULT: ABNORMAL
GRAM STN SPEC: ABNORMAL
GRAM STN SPEC: ABNORMAL

## 2022-07-22 RX ORDER — SULFAMETHOXAZOLE AND TRIMETHOPRIM 800; 160 MG/1; MG/1
1 TABLET ORAL 2 TIMES DAILY
Qty: 14 TABLET | Refills: 0 | OUTPATIENT
Start: 2022-07-22 | End: 2022-07-23

## 2022-07-23 ENCOUNTER — APPOINTMENT (OUTPATIENT)
Dept: CT IMAGING | Facility: HOSPITAL | Age: 22
End: 2022-07-23

## 2022-07-23 ENCOUNTER — HOSPITAL ENCOUNTER (EMERGENCY)
Facility: HOSPITAL | Age: 22
Discharge: HOME | End: 2022-07-23
Attending: EMERGENCY MEDICINE | Admitting: EMERGENCY MEDICINE

## 2022-07-23 ENCOUNTER — APPOINTMENT (OUTPATIENT)
Dept: GENERAL RADIOLOGY | Facility: HOSPITAL | Age: 22
End: 2022-07-23

## 2022-07-23 VITALS
BODY MASS INDEX: 24.11 KG/M2 | HEIGHT: 66 IN | OXYGEN SATURATION: 100 % | WEIGHT: 150 LBS | HEART RATE: 78 BPM | DIASTOLIC BLOOD PRESSURE: 83 MMHG | TEMPERATURE: 98.2 F | SYSTOLIC BLOOD PRESSURE: 124 MMHG | RESPIRATION RATE: 16 BRPM

## 2022-07-23 DIAGNOSIS — S06.0X9A CONCUSSION WITH LOSS OF CONSCIOUSNESS, INITIAL ENCOUNTER: ICD-10-CM

## 2022-07-23 DIAGNOSIS — S62.326A CLOSED DISPLACED FRACTURE OF SHAFT OF FIFTH METACARPAL BONE OF RIGHT HAND, INITIAL ENCOUNTER: Primary | ICD-10-CM

## 2022-07-23 DIAGNOSIS — M54.2 NECK PAIN: ICD-10-CM

## 2022-07-23 LAB — B-HCG UR QL: NEGATIVE

## 2022-07-23 PROCEDURE — 73130 X-RAY EXAM OF HAND: CPT

## 2022-07-23 PROCEDURE — 72125 CT NECK SPINE W/O DYE: CPT

## 2022-07-23 PROCEDURE — 81025 URINE PREGNANCY TEST: CPT | Performed by: NURSE PRACTITIONER

## 2022-07-23 PROCEDURE — 70450 CT HEAD/BRAIN W/O DYE: CPT

## 2022-07-23 PROCEDURE — 99283 EMERGENCY DEPT VISIT LOW MDM: CPT

## 2022-07-23 RX ORDER — HYDROCODONE BITARTRATE AND ACETAMINOPHEN 5; 325 MG/1; MG/1
1 TABLET ORAL ONCE
Status: COMPLETED | OUTPATIENT
Start: 2022-07-23 | End: 2022-07-23

## 2022-07-23 RX ORDER — HYDROCODONE BITARTRATE AND ACETAMINOPHEN 5; 325 MG/1; MG/1
1 TABLET ORAL EVERY 6 HOURS PRN
Qty: 12 TABLET | Refills: 0 | Status: SHIPPED | OUTPATIENT
Start: 2022-07-23 | End: 2022-11-07

## 2022-07-23 RX ADMIN — HYDROCODONE BITARTRATE AND ACETAMINOPHEN 1 TABLET: 5; 325 TABLET ORAL at 19:20
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
## 2022-07-31 ENCOUNTER — TELEPHONE (OUTPATIENT)
Dept: EMERGENCY DEPT | Facility: HOSPITAL | Age: 22
End: 2022-07-31

## 2022-11-03 ENCOUNTER — OFFICE VISIT (OUTPATIENT)
Dept: PSYCHIATRY | Facility: CLINIC | Age: 22
End: 2022-11-03

## 2022-11-03 DIAGNOSIS — F15.20 METHAMPHETAMINE USE DISORDER, SEVERE, DEPENDENCE: Primary | ICD-10-CM

## 2022-11-03 DIAGNOSIS — F10.10 ALCOHOL USE DISORDER, MILD, ABUSE: ICD-10-CM

## 2022-11-03 DIAGNOSIS — F13.10 SEDATIVE, HYPNOTIC OR ANXIOLYTIC USE DISORDER, MILD, ABUSE: ICD-10-CM

## 2022-11-03 PROCEDURE — 90791 PSYCH DIAGNOSTIC EVALUATION: CPT | Performed by: SOCIAL WORKER
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
## 2022-11-07 ENCOUNTER — OFFICE VISIT (OUTPATIENT)
Dept: PSYCHIATRY | Facility: CLINIC | Age: 22
End: 2022-11-07

## 2022-11-07 ENCOUNTER — APPOINTMENT (OUTPATIENT)
Dept: PSYCHIATRY | Facility: HOSPITAL | Age: 22
End: 2022-11-07

## 2022-11-07 VITALS
HEIGHT: 66 IN | WEIGHT: 160 LBS | DIASTOLIC BLOOD PRESSURE: 76 MMHG | BODY MASS INDEX: 25.71 KG/M2 | SYSTOLIC BLOOD PRESSURE: 116 MMHG

## 2022-11-07 DIAGNOSIS — F15.21 METHAMPHETAMINE USE DISORDER, SEVERE, IN EARLY REMISSION: ICD-10-CM

## 2022-11-07 DIAGNOSIS — F31.32 BIPOLAR AFFECTIVE DISORDER, CURRENTLY DEPRESSED, MODERATE: Primary | ICD-10-CM

## 2022-11-07 DIAGNOSIS — F13.10 SEDATIVE, HYPNOTIC OR ANXIOLYTIC USE DISORDER, MILD, ABUSE: ICD-10-CM

## 2022-11-07 PROCEDURE — 90792 PSYCH DIAG EVAL W/MED SRVCS: CPT | Performed by: NURSE PRACTITIONER
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
## 2022-11-08 ENCOUNTER — APPOINTMENT (OUTPATIENT)
Dept: PSYCHIATRY | Facility: HOSPITAL | Age: 22
End: 2022-11-08

## 2022-11-09 ENCOUNTER — APPOINTMENT (OUTPATIENT)
Dept: PSYCHIATRY | Facility: HOSPITAL | Age: 22
End: 2022-11-09

## 2022-11-10 ENCOUNTER — APPOINTMENT (OUTPATIENT)
Dept: PSYCHIATRY | Facility: HOSPITAL | Age: 22
End: 2022-11-10

## 2022-11-14 ENCOUNTER — APPOINTMENT (OUTPATIENT)
Dept: PSYCHIATRY | Facility: HOSPITAL | Age: 22
End: 2022-11-14

## 2022-11-16 ENCOUNTER — APPOINTMENT (OUTPATIENT)
Dept: PSYCHIATRY | Facility: HOSPITAL | Age: 22
End: 2022-11-16

## 2022-11-17 ENCOUNTER — APPOINTMENT (OUTPATIENT)
Dept: PSYCHIATRY | Facility: HOSPITAL | Age: 22
End: 2022-11-17

## 2022-11-21 ENCOUNTER — OFFICE VISIT (OUTPATIENT)
Dept: PSYCHIATRY | Facility: HOSPITAL | Age: 22
End: 2022-11-21

## 2022-11-21 ENCOUNTER — LAB (OUTPATIENT)
Dept: LAB | Facility: HOSPITAL | Age: 22
End: 2022-11-21

## 2022-11-21 DIAGNOSIS — F10.10 ALCOHOL USE DISORDER, MILD, ABUSE: ICD-10-CM

## 2022-11-21 DIAGNOSIS — F13.10 SEDATIVE, HYPNOTIC OR ANXIOLYTIC USE DISORDER, MILD, ABUSE: ICD-10-CM

## 2022-11-21 DIAGNOSIS — F15.20 METHAMPHETAMINE USE DISORDER, SEVERE, DEPENDENCE: Primary | ICD-10-CM

## 2022-11-21 DIAGNOSIS — F15.21 METHAMPHETAMINE USE DISORDER, SEVERE, IN EARLY REMISSION: ICD-10-CM

## 2022-11-21 PROCEDURE — 80307 DRUG TEST PRSMV CHEM ANLYZR: CPT

## 2022-11-21 PROCEDURE — H0015 ALCOHOL AND/OR DRUG SERVICES: HCPCS | Performed by: NURSE PRACTITIONER

## 2022-11-21 PROCEDURE — G0480 DRUG TEST DEF 1-7 CLASSES: HCPCS

## 2022-11-22 ENCOUNTER — OFFICE VISIT (OUTPATIENT)
Dept: PSYCHIATRY | Facility: HOSPITAL | Age: 22
End: 2022-11-22

## 2022-11-22 DIAGNOSIS — F15.20 METHAMPHETAMINE USE DISORDER, SEVERE, DEPENDENCE: Primary | ICD-10-CM

## 2022-11-22 DIAGNOSIS — F10.10 ALCOHOL USE DISORDER, MILD, ABUSE: ICD-10-CM

## 2022-11-22 DIAGNOSIS — F13.10 SEDATIVE, HYPNOTIC OR ANXIOLYTIC USE DISORDER, MILD, ABUSE: ICD-10-CM

## 2022-11-22 PROCEDURE — H0015 ALCOHOL AND/OR DRUG SERVICES: HCPCS | Performed by: NURSE PRACTITIONER

## 2022-11-23 ENCOUNTER — OFFICE VISIT (OUTPATIENT)
Dept: PSYCHIATRY | Facility: HOSPITAL | Age: 22
End: 2022-11-23

## 2022-11-23 DIAGNOSIS — F10.10 ALCOHOL USE DISORDER, MILD, ABUSE: ICD-10-CM

## 2022-11-23 DIAGNOSIS — F15.20 METHAMPHETAMINE USE DISORDER, SEVERE, DEPENDENCE: Primary | ICD-10-CM

## 2022-11-23 DIAGNOSIS — F13.10 SEDATIVE, HYPNOTIC OR ANXIOLYTIC USE DISORDER, MILD, ABUSE: ICD-10-CM

## 2022-11-23 LAB — ETHANOL UR-MCNC: NEGATIVE %

## 2022-11-23 PROCEDURE — H0015 ALCOHOL AND/OR DRUG SERVICES: HCPCS | Performed by: NURSE PRACTITIONER

## 2022-11-24 ENCOUNTER — APPOINTMENT (OUTPATIENT)
Dept: PSYCHIATRY | Facility: HOSPITAL | Age: 22
End: 2022-11-24

## 2022-11-29 ENCOUNTER — TELEPHONE (OUTPATIENT)
Dept: EMERGENCY DEPT | Facility: HOSPITAL | Age: 22
End: 2022-11-29

## 2022-11-29 NOTE — PROGRESS NOTES
"[facility] IOP GROUP     Date: 11/23/2022  Name: Pamela Pena    Time In: 1800   Time Out: 2040    Number of participants: 8    IOP GROUP NOTE     Data: 3 hour IOP group therapy session (Check-ins, Coping Skills, Relapse Prevention)     Check Ins: Therapist continued facilitation of rapport building strategies between group members. Therapist asked that each patient check in with home life and recovery efforts and identify triggers, cravings, and high risk situations that arise between group sessions. Therapist provided empathy and support during group session.     Session Content/Coping Skills: Check ins completed by group members. Clinician educated and discussed with group members the “pink cloud” stage of recovery. Recovery crossword activity completed by group members. Clinician discussed stages of change with group members. Relapse prevention plans developed and group members shared relapse prevention plans.      Response: Patient attended class in person. Patient participated in completion of check in form. Patient on check in form answered no to question \"currently or since last group meeting have you had any suicidal thoughts or plan or intent to hurt yourself”, and patient also answered no on check in form to question of \"currently or since your last group meeting, have you had any homicidal thoughts or plan or intent to hurt others\". Patient on check in form reported concerns with sleep and medication concerns. Clinician encouraged patient to call office on Monday or go to ED if needed regarding medications.     Personal Assessment 0-10 Scale (0-none, 10-high)    Anxiety:  1   Depression:  3   Cravings: 0     Assessment:     ..  Lab on 11/21/2022   Component Date Value Ref Range Status   • THC, Screen, Urine 11/21/2022 Negative  Negative Final   • Phencyclidine (PCP), Urine 11/21/2022 Negative  Negative Final   • Cocaine Screen, Urine 11/21/2022 Negative  Negative Final   • Methamphetamine, Ur 11/21/2022 " Negative  Negative Final   • Opiate Screen 11/21/2022 Negative  Negative Final   • Amphetamine Screen, Urine 11/21/2022 Negative  Negative Final   • Benzodiazepine Screen, Urine 11/21/2022 Negative  Negative Final   • Tricyclic Antidepressants Screen 11/21/2022 Negative  Negative Final   • Methadone Screen, Urine 11/21/2022 Negative  Negative Final   • Barbiturates Screen, Urine 11/21/2022 Negative  Negative Final   • Oxycodone Screen, Urine 11/21/2022 Negative  Negative Final   • Propoxyphene Screen 11/21/2022 Negative  Negative Final   • Buprenorphine, Screen, Urine 11/21/2022 Negative  Negative Final   • Ethanol, Urine 11/21/2022 Negative  Cutoff=0.020 % Final       Mental Status Exam  Hygiene:  good  Dress: casual  Attitude: cooperative and agreeable   Motor Activity: appropriate  Eye Contact:  good  Speech: regular rate and rhythm   Mood:  calm and cooperative  Affect:  Appropriate  Thought Processes:  Linear  Thought Content:  Normal  Suicidal Thoughts:  denies  Homicidal Thoughts:  denies  Crisis Safety Plan: Safety plan has been discussed.   Hallucinations:  Unknown to clinician.   Reliability: fair  Insight: fair  Judgement: fair  Impulse Control: fair    Recovery/spiritual support group attendance: No     Progress toward goal: Not at goal    Prognosis: Fair with Ongoing Treatment     Self-reported number of days sober: Patient on check in form reported 55 days (drugs) and 5 days (alcohol).    Patient will contact this office, call 911 or present to the nearest emergency room should suicidal or homicidal ideations occur.    Impression/Formulation:    ICD-10-CM ICD-9-CM   1. Methamphetamine use disorder, severe, dependence (HCC)  F15.20 304.40   2. Sedative, hypnotic or anxiolytic use disorder, mild, abuse (HCC)  F13.10 305.40   3. Alcohol use disorder, mild, abuse  F10.10 305.00       Clinical Maneuvering/Interventions: Therapist utilized a person-centered approach to build rapport with group member.  Therapist implemented motivational interviewing techniques to assist client with exploring and resolving ambivalence associated with commitment to change behaviors related to substance use and addiction. Therapist applied cognitive behavioral strategies to facilitate identification of maladaptive patterns of thinking and behavior that contribute to client's risk for continued substance use and relapse. Therapist employed group interaction activities to build rapport among group members, promote sobriety, and emphasize relapse prevention. Therapist promoted safe nonjudgmental environment by providing group members with unconditional positive regard and encouraging group members to comply with group rules and guidelines. Therapist assisted group member with identifying and implementing healthier coping strategies.      Plan:  Continue [facility] IOP Phase I   Aftercare:  [facility] Phase II  Program Assignments:  Personal recovery plan, relapse prevention plan, attendance of recovery support group meetings, exploration of sponsorship, drug/alcohol screens.     Oscar Suazo LCSW  11/29/2022  14:31 EST

## 2022-11-29 NOTE — PROGRESS NOTES
"[facility] IOP GROUP     Date: 11/22/2022  Name: Pamela Pena    Time In: 1800   Time Out: 2043    Number of participants: 11    IOP GROUP NOTE     Data: 3 hour IOP group therapy session (Check-ins, Coping Skills, Relapse Prevention)     Check Ins: Therapist continued facilitation of rapport building strategies between group members. Therapist asked that each patient check in with home life and recovery efforts and identify triggers, cravings, and high risk situations that arise between group sessions. Therapist provided empathy and support during group session.     Session Content/Coping Skills: Check ins completed. Reviewed learning to say no successfully worksheet(taking the escalator). Group role playing scenarios practiced for upcoming thanksgiving holiday. Relapse prevention plans assigned for homework. Check out questions completed.     Response: Patient attended class in person. Patient participated in completion of check in form. Patient on check in form answered no to question \"currently or since last group meeting have you had any suicidal thoughts or plan or intent to hurt yourself”, and patient also answered no on check in form to question of \"currently or since your last group meeting, have you had any homicidal thoughts or plan or intent to hurt others\".     Personal Assessment 0-10 Scale (0-none, 10-high)    Anxiety:  0   Depression:  3   Cravings: 0     Assessment:     ..  Lab on 11/21/2022   Component Date Value Ref Range Status   • THC, Screen, Urine 11/21/2022 Negative  Negative Final   • Phencyclidine (PCP), Urine 11/21/2022 Negative  Negative Final   • Cocaine Screen, Urine 11/21/2022 Negative  Negative Final   • Methamphetamine, Ur 11/21/2022 Negative  Negative Final   • Opiate Screen 11/21/2022 Negative  Negative Final   • Amphetamine Screen, Urine 11/21/2022 Negative  Negative Final   • Benzodiazepine Screen, Urine 11/21/2022 Negative  Negative Final   • Tricyclic Antidepressants Screen 11/21/2022 " Negative  Negative Final   • Methadone Screen, Urine 11/21/2022 Negative  Negative Final   • Barbiturates Screen, Urine 11/21/2022 Negative  Negative Final   • Oxycodone Screen, Urine 11/21/2022 Negative  Negative Final   • Propoxyphene Screen 11/21/2022 Negative  Negative Final   • Buprenorphine, Screen, Urine 11/21/2022 Negative  Negative Final   • Ethanol, Urine 11/21/2022 Negative  Cutoff=0.020 % Final       Mental Status Exam  Hygiene:  good  Dress: casual  Attitude: cooperative and agreeable   Motor Activity: appropriate  Eye Contact:  good  Speech: regular rate and rhythm   Mood:  calm and cooperative  Affect:  Appropriate  Thought Processes:  Linear  Thought Content:  Normal  Suicidal Thoughts:  denies  Homicidal Thoughts:  denies  Crisis Safety Plan: Safety plan has been discussed.   Hallucinations:  Unknown to clinician.   Reliability: fair  Insight: fair  Judgement: fair  Impulse Control: fair    Recovery/spiritual support group attendance: No     Progress toward goal: Not at goal    Prognosis: Fair with Ongoing Treatment     Self-reported number of days sober: Patient reported 54 days (drugs) and 4 days (alcohol) on check in form.     Patient will contact this office, call 911 or present to the nearest emergency room should suicidal or homicidal ideations occur.    Impression/Formulation:    ICD-10-CM ICD-9-CM   1. Methamphetamine use disorder, severe, dependence (HCC)  F15.20 304.40   2. Sedative, hypnotic or anxiolytic use disorder, mild, abuse (HCC)  F13.10 305.40   3. Alcohol use disorder, mild, abuse  F10.10 305.00       Clinical Maneuvering/Interventions: Therapist utilized a person-centered approach to build rapport with group member. Therapist implemented motivational interviewing techniques to assist client with exploring and resolving ambivalence associated with commitment to change behaviors related to substance use and addiction. Therapist applied cognitive behavioral strategies to facilitate  identification of maladaptive patterns of thinking and behavior that contribute to client's risk for continued substance use and relapse. Therapist employed group interaction activities to build rapport among group members, promote sobriety, and emphasize relapse prevention. Therapist promoted safe nonjudgmental environment by providing group members with unconditional positive regard and encouraging group members to comply with group rules and guidelines. Therapist assisted group member with identifying and implementing healthier coping strategies.      Plan:  Continue [facility] IOP Phase I   Aftercare:  [facility] Phase II  Program Assignments:  Personal recovery plan, relapse prevention plan, attendance of recovery support group meetings, exploration of sponsorship, drug/alcohol screens.     Oscar Suazo LCSW  11/29/2022  14:13 EST

## 2022-11-29 NOTE — PROGRESS NOTES
"CD IOP GROUP     Date: 11/21/2022  Name: Pamela Pena    Time In: 1800   Time Out: 2047    Number of participants: 11    IOP GROUP NOTE     Data: 3 hour IOP group therapy session (Check-ins, Coping Skills, Relapse Prevention)     Check Ins: Therapist continued facilitation of rapport building strategies between group members. Therapist asked that each patient check in with home life and recovery efforts and identify triggers, cravings, and high risk situations that arise between group sessions. Therapist provided empathy and support during group session.     Session Content/Coping Skills: Introductions completed. Check ins completed by group members. My IOP teammates group activity completed. [place] [facility] psychoeducational material reviewed and discussed. Reviewed and discussed taking the escalator Learning to Say No Successfully psychoeducational material.    Response:  Patient attended class in person. Patient participated in completion of check in form. Patient on check in form answered no to question \"currently or in the past 7 days have you had any suicidal thoughts or plan or intent to hurt yourself”, and patient also answered no on check in form to question of \"currently or in the past 7 days, have you had any homicidal thoughts or plan or intent to hurt others\". Patient on check in form reported concerns with sleep, appetite, and medications in the past 7 days.     Personal Assessment 0-10 Scale (0-none, 10-high)    Anxiety:  8   Depression:  0   Cravings: 0     Assessment:     ..  Lab on 11/21/2022   Component Date Value Ref Range Status   • THC, Screen, Urine 11/21/2022 Negative  Negative Final   • Phencyclidine (PCP), Urine 11/21/2022 Negative  Negative Final   • Cocaine Screen, Urine 11/21/2022 Negative  Negative Final   • Methamphetamine, Ur 11/21/2022 Negative  Negative Final   • Opiate Screen 11/21/2022 Negative  Negative Final   • Amphetamine Screen, Urine 11/21/2022 Negative  Negative Final "   • Benzodiazepine Screen, Urine 11/21/2022 Negative  Negative Final   • Tricyclic Antidepressants Screen 11/21/2022 Negative  Negative Final   • Methadone Screen, Urine 11/21/2022 Negative  Negative Final   • Barbiturates Screen, Urine 11/21/2022 Negative  Negative Final   • Oxycodone Screen, Urine 11/21/2022 Negative  Negative Final   • Propoxyphene Screen 11/21/2022 Negative  Negative Final   • Buprenorphine, Screen, Urine 11/21/2022 Negative  Negative Final   • Ethanol, Urine 11/21/2022 Negative  Cutoff=0.020 % Final       Mental Status Exam  Hygiene:  good  Dress: casual  Attitude: cooperative and agreeable   Motor Activity: appropriate  Eye Contact:  good  Speech: regular rate and rhythm   Mood:  calm and cooperative  Affect:  Appropriate  Thought Processes:  Linear  Thought Content:  Normal  Suicidal Thoughts:  denies  Homicidal Thoughts:  denies  Crisis Safety Plan: Safety plan has been discussed.   Hallucinations:  Unknown to clinician.   Reliability: fair  Insight: fair  Judgement: fair  Impulse Control: fair    Recovery/spiritual support group attendance: No     Progress toward goal: Not at goal    Prognosis: Fair with Ongoing Treatment     Self-reported number of days sober:  Patient reported 53, alcohol 3 days.     Patient will contact this office, call 911 or present to the nearest emergency room should suicidal or homicidal ideations occur.    Impression/Formulation:    ICD-10-CM ICD-9-CM   1. Methamphetamine use disorder, severe, dependence (HCC)  F15.20 304.40   2. Sedative, hypnotic or anxiolytic use disorder, mild, abuse (HCC)  F13.10 305.40   3. Alcohol use disorder, mild, abuse  F10.10 305.00       Clinical Maneuvering/Interventions: Therapist utilized a person-centered approach to build rapport with group member. Therapist implemented motivational interviewing techniques to assist client with exploring and resolving ambivalence associated with commitment to change behaviors related to substance  use and addiction. Therapist applied cognitive behavioral strategies to facilitate identification of maladaptive patterns of thinking and behavior that contribute to client's risk for continued substance use and relapse. Therapist employed group interaction activities to build rapport among group members, promote sobriety, and emphasize relapse prevention. Therapist promoted safe nonjudgmental environment by providing group members with unconditional positive regard and encouraging group members to comply with group rules and guidelines. Therapist assisted group member with identifying and implementing healthier coping strategies.      Plan:  Continue [facility] IOP Phase I   Aftercare:  [facility] Phase II  Program Assignments:  Personal recovery plan, relapse prevention plan, attendance of recovery support group meetings, exploration of sponsorship, drug/alcohol screens.     Oscar Suazo LCSW  11/29/2022  13:46 EST

## 2022-11-30 ENCOUNTER — OFFICE VISIT (OUTPATIENT)
Dept: PSYCHIATRY | Facility: HOSPITAL | Age: 22
End: 2022-11-30

## 2022-11-30 DIAGNOSIS — F13.10 SEDATIVE, HYPNOTIC OR ANXIOLYTIC USE DISORDER, MILD, ABUSE: ICD-10-CM

## 2022-11-30 DIAGNOSIS — F15.20 METHAMPHETAMINE USE DISORDER, SEVERE, DEPENDENCE: Primary | ICD-10-CM

## 2022-11-30 DIAGNOSIS — F10.10 ALCOHOL USE DISORDER, MILD, ABUSE: ICD-10-CM

## 2022-11-30 LAB — GABAPENTIN UR-MCNC: NEGATIVE UG/ML

## 2022-11-30 PROCEDURE — H0015 ALCOHOL AND/OR DRUG SERVICES: HCPCS | Performed by: NURSE PRACTITIONER
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
## 2022-12-03 ENCOUNTER — APPOINTMENT (OUTPATIENT)
Dept: GENERAL RADIOLOGY | Facility: HOSPITAL | Age: 22
End: 2022-12-03

## 2022-12-03 ENCOUNTER — HOSPITAL ENCOUNTER (EMERGENCY)
Facility: HOSPITAL | Age: 22
Discharge: HOME | End: 2022-12-03
Attending: EMERGENCY MEDICINE | Admitting: EMERGENCY MEDICINE

## 2022-12-03 VITALS
OXYGEN SATURATION: 97 % | DIASTOLIC BLOOD PRESSURE: 77 MMHG | WEIGHT: 145 LBS | HEIGHT: 65 IN | RESPIRATION RATE: 20 BRPM | TEMPERATURE: 99.2 F | SYSTOLIC BLOOD PRESSURE: 126 MMHG | HEART RATE: 102 BPM | BODY MASS INDEX: 24.16 KG/M2

## 2022-12-03 DIAGNOSIS — J10.1 INFLUENZA A: Primary | ICD-10-CM

## 2022-12-03 LAB
B-HCG UR QL: NEGATIVE
FLUAV RNA RESP QL NAA+PROBE: DETECTED
FLUBV RNA RESP QL NAA+PROBE: NOT DETECTED
S PYO AG THROAT QL: NEGATIVE
SARS-COV-2 RNA RESP QL NAA+PROBE: NOT DETECTED

## 2022-12-03 PROCEDURE — 71045 X-RAY EXAM CHEST 1 VIEW: CPT

## 2022-12-03 PROCEDURE — 87880 STREP A ASSAY W/OPTIC: CPT | Performed by: PHYSICIAN ASSISTANT

## 2022-12-03 PROCEDURE — 81025 URINE PREGNANCY TEST: CPT | Performed by: PHYSICIAN ASSISTANT

## 2022-12-03 PROCEDURE — 99283 EMERGENCY DEPT VISIT LOW MDM: CPT

## 2022-12-03 PROCEDURE — 87081 CULTURE SCREEN ONLY: CPT | Performed by: PHYSICIAN ASSISTANT

## 2022-12-03 PROCEDURE — 87636 SARSCOV2 & INF A&B AMP PRB: CPT | Performed by: PHYSICIAN ASSISTANT

## 2022-12-03 RX ORDER — ONDANSETRON 4 MG/1
4 TABLET, ORALLY DISINTEGRATING ORAL EVERY 6 HOURS PRN
Qty: 8 TABLET | Refills: 0 | Status: SHIPPED | OUTPATIENT
Start: 2022-12-03 | End: 2022-12-05
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
## 2022-12-04 NOTE — DISCHARGE INSTRUCTIONS
You tested positive for influenza A.  May take over-the-counter cough and cold medicine such as NyQuil, DayQuil, Mucinex.  May alternate ibuprofen with these medications as well as directed.  Take Zofran as needed for nausea vomiting.  Make sure you drink plenty of fluids to stay well-hydrated.  May follow-up with primary care provider in the next few days if not improving, contact patient connection to establish care.  Return to the ER for any change, worsening symptoms, or any additional concerns including but not limited to chest pain, worsening shortness of breath, intractable vomiting, hemoptysis, dizziness, syncope.

## 2022-12-05 LAB — BACTERIA SPEC AEROBE CULT: NORMAL

## 2022-12-12 ENCOUNTER — DOCUMENTATION (OUTPATIENT)
Dept: PSYCHIATRY | Facility: HOSPITAL | Age: 22
End: 2022-12-12

## 2022-12-12 ENCOUNTER — OFFICE VISIT (OUTPATIENT)
Dept: PSYCHIATRY | Facility: HOSPITAL | Age: 22
End: 2022-12-12
Payer: COMMERCIAL

## 2022-12-12 DIAGNOSIS — F13.10 SEDATIVE, HYPNOTIC OR ANXIOLYTIC USE DISORDER, MILD, ABUSE: ICD-10-CM

## 2022-12-12 DIAGNOSIS — F15.20 METHAMPHETAMINE USE DISORDER, SEVERE, DEPENDENCE: Primary | ICD-10-CM

## 2022-12-12 DIAGNOSIS — F10.10 ALCOHOL USE DISORDER, MILD, ABUSE: ICD-10-CM

## 2022-12-12 PROCEDURE — H0015 ALCOHOL AND/OR DRUG SERVICES: HCPCS | Performed by: NURSE PRACTITIONER

## 2022-12-12 NOTE — PROGRESS NOTES
Please see previous note regarding CD-IOP from this date. Patient called back at 02:52 PM. Patient stated she will be at CD-IOP class tonight. Patient stated she had just got over flu.     -Oscar Suazo LCSW

## 2022-12-12 NOTE — PROGRESS NOTES
Clinician this date (12/12/2022) called patient ([phone]) at 02:45 PM, no answer, left voicemail to call back. Clinician was calling to follow up regarding CD-IOP. Patient missed class on 12/1/2022 (Absence #2), 12/5/2022 (Absence #3), 12/7/2022 (Absence #4), and 12/8/2022 (Absence #5).     -Oscar Suazo LCSW

## 2022-12-13 ENCOUNTER — LAB (OUTPATIENT)
Dept: LAB | Facility: HOSPITAL | Age: 22
End: 2022-12-13

## 2022-12-13 ENCOUNTER — OFFICE VISIT (OUTPATIENT)
Dept: PSYCHIATRY | Facility: HOSPITAL | Age: 22
End: 2022-12-13
Payer: COMMERCIAL

## 2022-12-13 DIAGNOSIS — F15.21 METHAMPHETAMINE USE DISORDER, SEVERE, IN EARLY REMISSION: ICD-10-CM

## 2022-12-13 DIAGNOSIS — F13.10 SEDATIVE, HYPNOTIC OR ANXIOLYTIC USE DISORDER, MILD, ABUSE: ICD-10-CM

## 2022-12-13 DIAGNOSIS — F15.20 METHAMPHETAMINE USE DISORDER, SEVERE, DEPENDENCE: Primary | ICD-10-CM

## 2022-12-13 DIAGNOSIS — F10.10 ALCOHOL USE DISORDER, MILD, ABUSE: ICD-10-CM

## 2022-12-13 DIAGNOSIS — F31.32 BIPOLAR AFFECTIVE DISORDER, CURRENTLY DEPRESSED, MODERATE: ICD-10-CM

## 2022-12-13 LAB
AMPHET+METHAMPHET UR QL: NEGATIVE
AMPHETAMINES UR QL: NEGATIVE
B-HCG UR QL: NEGATIVE
BARBITURATES UR QL SCN: NEGATIVE
BENZODIAZ UR QL SCN: NEGATIVE
BUPRENORPHINE SERPL-MCNC: NEGATIVE NG/ML
CANNABINOIDS SERPL QL: NEGATIVE
COCAINE UR QL: NEGATIVE
METHADONE UR QL SCN: NEGATIVE
OPIATES UR QL: NEGATIVE
OXYCODONE UR QL SCN: NEGATIVE
PCP UR QL SCN: NEGATIVE
PROPOXYPH UR QL: NEGATIVE
TRICYCLICS UR QL SCN: NEGATIVE

## 2022-12-13 PROCEDURE — 81025 URINE PREGNANCY TEST: CPT

## 2022-12-13 PROCEDURE — 80307 DRUG TEST PRSMV CHEM ANLYZR: CPT

## 2022-12-13 PROCEDURE — H0015 ALCOHOL AND/OR DRUG SERVICES: HCPCS | Performed by: NURSE PRACTITIONER

## 2022-12-13 PROCEDURE — G0480 DRUG TEST DEF 1-7 CLASSES: HCPCS

## 2022-12-15 ENCOUNTER — APPOINTMENT (OUTPATIENT)
Dept: PSYCHIATRY | Facility: HOSPITAL | Age: 22
End: 2022-12-15
Payer: COMMERCIAL

## 2022-12-16 LAB — ETHANOL UR-MCNC: NEGATIVE %

## 2022-12-17 LAB — GABAPENTIN UR-MCNC: NEGATIVE UG/ML

## 2022-12-19 ENCOUNTER — OFFICE VISIT (OUTPATIENT)
Dept: PSYCHIATRY | Facility: HOSPITAL | Age: 22
End: 2022-12-19
Payer: COMMERCIAL

## 2022-12-19 DIAGNOSIS — F13.10 SEDATIVE, HYPNOTIC OR ANXIOLYTIC USE DISORDER, MILD, ABUSE: ICD-10-CM

## 2022-12-19 DIAGNOSIS — F10.10 ALCOHOL USE DISORDER, MILD, ABUSE: ICD-10-CM

## 2022-12-19 DIAGNOSIS — F15.20 METHAMPHETAMINE USE DISORDER, SEVERE, DEPENDENCE: Primary | ICD-10-CM

## 2022-12-19 PROCEDURE — H0015 ALCOHOL AND/OR DRUG SERVICES: HCPCS | Performed by: NURSE PRACTITIONER

## 2022-12-21 ENCOUNTER — LAB (OUTPATIENT)
Dept: LAB | Facility: HOSPITAL | Age: 22
End: 2022-12-21

## 2022-12-21 ENCOUNTER — OFFICE VISIT (OUTPATIENT)
Dept: PSYCHIATRY | Facility: HOSPITAL | Age: 22
End: 2022-12-21
Payer: COMMERCIAL

## 2022-12-21 DIAGNOSIS — F10.10 ALCOHOL USE DISORDER, MILD, ABUSE: ICD-10-CM

## 2022-12-21 DIAGNOSIS — F13.10 SEDATIVE, HYPNOTIC OR ANXIOLYTIC USE DISORDER, MILD, ABUSE: ICD-10-CM

## 2022-12-21 DIAGNOSIS — F15.20 METHAMPHETAMINE USE DISORDER, SEVERE, DEPENDENCE: Primary | ICD-10-CM

## 2022-12-21 DIAGNOSIS — F15.21 METHAMPHETAMINE USE DISORDER, SEVERE, IN EARLY REMISSION: ICD-10-CM

## 2022-12-21 PROCEDURE — H0015 ALCOHOL AND/OR DRUG SERVICES: HCPCS | Performed by: NURSE PRACTITIONER

## 2022-12-21 PROCEDURE — G0480 DRUG TEST DEF 1-7 CLASSES: HCPCS

## 2022-12-21 PROCEDURE — 80307 DRUG TEST PRSMV CHEM ANLYZR: CPT

## 2022-12-22 ENCOUNTER — OFFICE VISIT (OUTPATIENT)
Dept: OBSTETRICS AND GYNECOLOGY | Facility: CLINIC | Age: 22
End: 2022-12-22

## 2022-12-22 ENCOUNTER — OFFICE VISIT (OUTPATIENT)
Dept: PSYCHIATRY | Facility: HOSPITAL | Age: 22
End: 2022-12-22
Payer: COMMERCIAL

## 2022-12-22 VITALS — SYSTOLIC BLOOD PRESSURE: 100 MMHG | WEIGHT: 157.4 LBS | DIASTOLIC BLOOD PRESSURE: 62 MMHG | BODY MASS INDEX: 26.19 KG/M2

## 2022-12-22 DIAGNOSIS — F10.10 ALCOHOL USE DISORDER, MILD, ABUSE: ICD-10-CM

## 2022-12-22 DIAGNOSIS — N89.8 VAGINAL DISCHARGE: Primary | ICD-10-CM

## 2022-12-22 DIAGNOSIS — F15.20 METHAMPHETAMINE USE DISORDER, SEVERE, DEPENDENCE: Primary | ICD-10-CM

## 2022-12-22 DIAGNOSIS — F13.10 SEDATIVE, HYPNOTIC OR ANXIOLYTIC USE DISORDER, MILD, ABUSE: ICD-10-CM

## 2022-12-22 DIAGNOSIS — Z01.419 ENCOUNTER FOR GYNECOLOGICAL EXAMINATION WITHOUT ABNORMAL FINDING: ICD-10-CM

## 2022-12-22 PROCEDURE — H0015 ALCOHOL AND/OR DRUG SERVICES: HCPCS | Performed by: NURSE PRACTITIONER

## 2022-12-22 PROCEDURE — 99395 PREV VISIT EST AGE 18-39: CPT | Performed by: OBSTETRICS & GYNECOLOGY

## 2022-12-22 RX ORDER — METRONIDAZOLE 7.5 MG/G
GEL VAGINAL
Qty: 1 EACH | Refills: 4 | Status: SHIPPED | OUTPATIENT
Start: 2022-12-22 | End: 2023-01-12

## 2022-12-22 RX ORDER — METRONIDAZOLE 7.5 MG/G
GEL VAGINAL
Qty: 70 G | Refills: 4 | Status: SHIPPED | OUTPATIENT
Start: 2022-12-22 | End: 2023-01-12
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
## 2022-12-23 LAB — ETHANOL UR-MCNC: NEGATIVE %

## 2022-12-28 ENCOUNTER — LAB (OUTPATIENT)
Dept: LAB | Facility: HOSPITAL | Age: 22
End: 2022-12-28
Payer: COMMERCIAL

## 2022-12-28 DIAGNOSIS — F15.21 METHAMPHETAMINE USE DISORDER, SEVERE, IN EARLY REMISSION: ICD-10-CM

## 2022-12-28 DIAGNOSIS — F13.10 SEDATIVE, HYPNOTIC OR ANXIOLYTIC USE DISORDER, MILD, ABUSE: ICD-10-CM

## 2022-12-28 PROCEDURE — 80307 DRUG TEST PRSMV CHEM ANLYZR: CPT

## 2022-12-28 PROCEDURE — G0480 DRUG TEST DEF 1-7 CLASSES: HCPCS

## 2022-12-28 NOTE — PROGRESS NOTES
The patient know cultures otherwise negative for infection.  Indeterminate for bacterial vaginosis.  Do the MetroGel suppression as we discussed.  Thank you

## 2022-12-29 LAB — GABAPENTIN UR-MCNC: NEGATIVE UG/ML

## 2022-12-29 NOTE — PROGRESS NOTES
"[facility] GROUP     Date: 12/12/2022  Name: Pamela Pena    Time In: 1801   Time Out: 2058    Number of participants: 10    IOP GROUP NOTE     Data: 3 hour IOP group therapy session (Check-ins, Coping Skills, Relapse Prevention)     Check Ins: Therapist continued facilitation of rapport building strategies between group members. Therapist asked that each patient check in with home life and recovery efforts and identify triggers, cravings, and high risk situations that arise between group sessions. Therapist provided empathy and support during group session.     Session Content/Coping Skills: Three facts group icebreaker activity completed. Check ins completed by group members.  shared Just for Today reading. Cravings, Basic Principles reviewed (Taking the escalator). 50 ways to take a break sheet, replacing negative thoughts with positive thoughts sheet, and 81176 grounding technique sheet reviewed and discussed (taking the escalator). Urge Surfing, coping skills addiction, and anger management psychoeducational material reviewed and discussed (therapist aid). Grounding technique exercise completed. Began development of relapse prevention plans.     Response: Patient attended class in person. Patient participated in completion of check in form. Patient on check in form answered no to question \"currently or since last group meeting have you had any suicidal thoughts or plan or intent to hurt yourself”, and patient also answered no on check in form to question of \"currently or since your last group meeting, have you had any homicidal thoughts or plan or intent to hurt others\".     Personal Assessment 0-10 Scale (0-none, 10-high)    Anxiety:  0   Depression:  0   Cravings: 0     Assessment:     ..  Admission on 12/03/2022, Discharged on 12/03/2022   Component Date Value Ref Range Status   • COVID19 12/03/2022 Not Detected  Not Detected - Ref. Range Final   • Influenza A PCR 12/03/2022 Detected (A) "  Not Detected Final   • Influenza B PCR 12/03/2022 Not Detected  Not Detected Final   • HCG, Urine QL 12/03/2022 Negative  Negative Final   • Strep A Ag 12/03/2022 Negative  Negative Final   • Throat Culture, Beta Strep 12/03/2022 No Beta Hemolytic Streptococcus Isolated   Final       Mental Status Exam  Hygiene:  good  Dress: casual  Attitude: cooperative and agreeable   Motor Activity: appropriate  Eye Contact:  good  Speech: regular rate and rhythm   Mood:  calm and cooperative  Affect:  Appropriate  Thought Processes:  Linear  Thought Content:  Normal  Suicidal Thoughts:  denies  Homicidal Thoughts:  denies  Crisis Safety Plan: Safety plan has been discussed.   Hallucinations:  Unknown to clinician.   Reliability: fair  Insight: fair  Judgement: fair  Impulse Control: fair    Recovery/spiritual support group attendance: No.      Progress toward goal: Not at goal    Prognosis: Fair with Ongoing Treatment     Self-reported number of days sober: Patient on check in form reported 75 days drugs and 15 days alcohol.     Patient will contact this office, call 911 or present to the nearest emergency room should suicidal or homicidal ideations occur.    Impression/Formulation:    ICD-10-CM ICD-9-CM   1. Methamphetamine use disorder, severe, dependence (HCC)  F15.20 304.40   2. Sedative, hypnotic or anxiolytic use disorder, mild, abuse (HCC)  F13.10 305.40   3. Alcohol use disorder, mild, abuse  F10.10 305.00       Clinical Maneuvering/Interventions: Therapist utilized a person-centered approach to build rapport with group member. Therapist implemented motivational interviewing techniques to assist client with exploring and resolving ambivalence associated with commitment to change behaviors related to substance use and addiction. Therapist applied cognitive behavioral strategies to facilitate identification of maladaptive patterns of thinking and behavior that contribute to client's risk for continued substance use and  relapse. Therapist employed group interaction activities to build rapport among group members, promote sobriety, and emphasize relapse prevention. Therapist promoted safe nonjudgmental environment by providing group members with unconditional positive regard and encouraging group members to comply with group rules and guidelines. Therapist assisted group member with identifying and implementing healthier coping strategies.      Plan:  Continue [facility] IOP Phase I   Aftercare:  [facility] Phase II  Program Assignments:  Personal recovery plan, relapse prevention plan, attendance of recovery support group meetings, exploration of sponsorship, drug/alcohol screens.     Oscar Suazo LCSW  12/29/2022  10:19 EST

## 2022-12-29 NOTE — PROGRESS NOTES
"[facility] GROUP     Date: 12/13/2022  Name: Pamela Pena    Time In: 1800   Time Out: 2050    Number of participants: 10    IOP GROUP NOTE     Data: 3 hour IOP group therapy session (Check-ins, Coping Skills, Relapse Prevention)     Check Ins: Therapist continued facilitation of rapport building strategies between group members. Therapist asked that each patient check in with home life and recovery efforts and identify triggers, cravings, and high risk situations that arise between group sessions. Therapist provided empathy and support during group session.     Session Content/Coping Skills: Check ins completed by group members. Clinician discussed with group members relationships in early recovery. Clinician shared and discussed inspirational quote with group members. Group members shared relapse prevention plans. Value card sort activity completed by group members. Clinician discussed values with group members and the importance of understanding values and explored how values were compromised in active addiction.     Response: Patient attended class in person. Patient participated in completion of check in form. Patient on check in form answered no to question \"currently or since last group meeting have you had any suicidal thoughts or plan or intent to hurt yourself”, and patient also answered no on check in form to question of \"currently or since your last group meeting, have you had any homicidal thoughts or plan or intent to hurt others\".     Personal Assessment 0-10 Scale (0-none, 10-high)    Anxiety:  4   Depression:  2   Cravings: 2     Assessment:     ..  Lab on 12/13/2022   Component Date Value Ref Range Status   • Gabapentin, Ur 12/13/2022 Negative  ug/mL Final   • HCG, Urine QL 12/13/2022 Negative  Negative Final   • THC, Screen, Urine 12/13/2022 Negative  Negative Final   • Phencyclidine (PCP), Urine 12/13/2022 Negative  Negative Final   • Cocaine Screen, Urine 12/13/2022 Negative  Negative Final   • " Methamphetamine, Ur 12/13/2022 Negative  Negative Final   • Opiate Screen 12/13/2022 Negative  Negative Final   • Amphetamine Screen, Urine 12/13/2022 Negative  Negative Final   • Benzodiazepine Screen, Urine 12/13/2022 Negative  Negative Final   • Tricyclic Antidepressants Screen 12/13/2022 Negative  Negative Final   • Methadone Screen, Urine 12/13/2022 Negative  Negative Final   • Barbiturates Screen, Urine 12/13/2022 Negative  Negative Final   • Oxycodone Screen, Urine 12/13/2022 Negative  Negative Final   • Propoxyphene Screen 12/13/2022 Negative  Negative Final   • Buprenorphine, Screen, Urine 12/13/2022 Negative  Negative Final   • Ethanol, Urine 12/13/2022 Negative  Cutoff=0.020 % Final   Admission on 12/03/2022, Discharged on 12/03/2022   Component Date Value Ref Range Status   • COVID19 12/03/2022 Not Detected  Not Detected - Ref. Range Final   • Influenza A PCR 12/03/2022 Detected (A)  Not Detected Final   • Influenza B PCR 12/03/2022 Not Detected  Not Detected Final   • HCG, Urine QL 12/03/2022 Negative  Negative Final   • Strep A Ag 12/03/2022 Negative  Negative Final   • Throat Culture, Beta Strep 12/03/2022 No Beta Hemolytic Streptococcus Isolated   Final       Mental Status Exam  Hygiene:  good  Dress: casual  Attitude: cooperative and agreeable   Motor Activity: appropriate  Eye Contact:  good  Speech: regular rate and rhythm   Mood:  calm and cooperative  Affect:  Appropriate  Thought Processes:  Linear  Thought Content:  Normal  Suicidal Thoughts:  denies  Homicidal Thoughts:  denies  Crisis Safety Plan: Safety plan has been discussed.   Hallucinations:  Unknown to clinician.   Reliability: fair  Insight: fair  Judgement: fair  Impulse Control: fair    Recovery/spiritual support group attendance: No.     Progress toward goal: Not at goal    Prognosis: Fair with Ongoing Treatment     Self-reported number of days sober: Patient reported 16 days on check in form.     Patient will contact this office,  call 911 or present to the nearest emergency room should suicidal or homicidal ideations occur.    Impression/Formulation:    ICD-10-CM ICD-9-CM   1. Methamphetamine use disorder, severe, dependence (HCC)  F15.20 304.40   2. Sedative, hypnotic or anxiolytic use disorder, mild, abuse (HCC)  F13.10 305.40   3. Alcohol use disorder, mild, abuse  F10.10 305.00       Clinical Maneuvering/Interventions: Therapist utilized a person-centered approach to build rapport with group member. Therapist implemented motivational interviewing techniques to assist client with exploring and resolving ambivalence associated with commitment to change behaviors related to substance use and addiction. Therapist applied cognitive behavioral strategies to facilitate identification of maladaptive patterns of thinking and behavior that contribute to client's risk for continued substance use and relapse. Therapist employed group interaction activities to build rapport among group members, promote sobriety, and emphasize relapse prevention. Therapist promoted safe nonjudgmental environment by providing group members with unconditional positive regard and encouraging group members to comply with group rules and guidelines. Therapist assisted group member with identifying and implementing healthier coping strategies.      Plan:  Continue [facility] IOP Phase I   Aftercare:  [facility] Phase II  Program Assignments:  Personal recovery plan, relapse prevention plan, attendance of recovery support group meetings, exploration of sponsorship, drug/alcohol screens.     Oscar Suazo LCSW  12/29/2022  13:39 EST

## 2022-12-30 LAB
ETHANOL UR-MCNC: NEGATIVE %
REF LAB TEST METHOD: NORMAL

## 2023-01-01 NOTE — PROGRESS NOTES
[facility] GROUP     Date: 12/19/2022  Name: Pamela Pena    Time In: 1800   Time Out: 2020    Number of participants: 10.    IOP GROUP NOTE     Data: 3 hour IOP group therapy session (Check-ins, Coping Skills, Relapse Prevention)     Check Ins: Therapist continued facilitation of rapport building strategies between group members. Therapist asked that each patient check in with home life and recovery efforts and identify triggers, cravings, and high risk situations that arise between group sessions. Therapist provided empathy and support during group session.     Session Content/Coping Skills: Check ins completed by group members.  shared Just for Today reading. Clinician explored with group members social policies that they feel are stigmatizing. Stages of Relapse Psychoeducational material reviewed and discussed. Self-Care Assessment (Therapist Aid) completed by group members. Group members identified which areas of self-care they could improve on. Self-Care tips (Therapist Aid) reviewed with group members.     Response: Patient attended class in person. Patient participated in completion of check in form. Patient on check in form answered no to question \"currently or since last group meeting have you had any suicidal thoughts or plan or intent to hurt yourself”, and patient also answered no on check in form to question of \"currently or since your last group meeting, have you had any homicidal thoughts or plan or intent to hurt others\". Clinician encouraged patient to reach out to APRN.     Personal Assessment 0-10 Scale (0-none, 10-high)    Anxiety:  5   Depression:  5   Cravings: 2     Assessment:     ..  Lab on 12/13/2022   Component Date Value Ref Range Status   • Gabapentin, Ur 12/13/2022 Negative  ug/mL Final   • HCG, Urine QL 12/13/2022 Negative  Negative Final   • THC, Screen, Urine 12/13/2022 Negative  Negative Final   • Phencyclidine (PCP), Urine 12/13/2022 Negative  Negative Final    • Cocaine Screen, Urine 12/13/2022 Negative  Negative Final   • Methamphetamine, Ur 12/13/2022 Negative  Negative Final   • Opiate Screen 12/13/2022 Negative  Negative Final   • Amphetamine Screen, Urine 12/13/2022 Negative  Negative Final   • Benzodiazepine Screen, Urine 12/13/2022 Negative  Negative Final   • Tricyclic Antidepressants Screen 12/13/2022 Negative  Negative Final   • Methadone Screen, Urine 12/13/2022 Negative  Negative Final   • Barbiturates Screen, Urine 12/13/2022 Negative  Negative Final   • Oxycodone Screen, Urine 12/13/2022 Negative  Negative Final   • Propoxyphene Screen 12/13/2022 Negative  Negative Final   • Buprenorphine, Screen, Urine 12/13/2022 Negative  Negative Final   • Ethanol, Urine 12/13/2022 Negative  Cutoff=0.020 % Final   Admission on 12/03/2022, Discharged on 12/03/2022   Component Date Value Ref Range Status   • COVID19 12/03/2022 Not Detected  Not Detected - Ref. Range Final   • Influenza A PCR 12/03/2022 Detected (A)  Not Detected Final   • Influenza B PCR 12/03/2022 Not Detected  Not Detected Final   • HCG, Urine QL 12/03/2022 Negative  Negative Final   • Strep A Ag 12/03/2022 Negative  Negative Final   • Throat Culture, Beta Strep 12/03/2022 No Beta Hemolytic Streptococcus Isolated   Final       Mental Status Exam  Hygiene:  good  Dress: casual  Attitude: cooperative and agreeable   Motor Activity: appropriate  Eye Contact:  good  Speech: regular rate and rhythm   Mood:  calm and cooperative  Affect:  Appropriate  Thought Processes:  Linear  Thought Content:  Normal  Suicidal Thoughts:  denies  Homicidal Thoughts:  denies  Crisis Safety Plan: Safety plan has been discussed.   Hallucinations:  Unknown to clinician.   Reliability: fair  Insight: fair  Judgement: fair  Impulse Control: fair    Recovery/spiritual support group attendance: No     Progress toward goal: Not at goal    Prognosis: Fair with Ongoing Treatment     Self-reported number of days sober: 23.    Patient  will contact this office, call 911 or present to the nearest emergency room should suicidal or homicidal ideations occur.    Impression/Formulation:    ICD-10-CM ICD-9-CM   1. Methamphetamine use disorder, severe, dependence (HCC)  F15.20 304.40   2. Sedative, hypnotic or anxiolytic use disorder, mild, abuse (HCC)  F13.10 305.40   3. Alcohol use disorder, mild, abuse  F10.10 305.00       Clinical Maneuvering/Interventions: Therapist utilized a person-centered approach to build rapport with group member. Therapist implemented motivational interviewing techniques to assist client with exploring and resolving ambivalence associated with commitment to change behaviors related to substance use and addiction. Therapist applied cognitive behavioral strategies to facilitate identification of maladaptive patterns of thinking and behavior that contribute to client's risk for continued substance use and relapse. Therapist employed group interaction activities to build rapport among group members, promote sobriety, and emphasize relapse prevention. Therapist promoted safe nonjudgmental environment by providing group members with unconditional positive regard and encouraging group members to comply with group rules and guidelines. Therapist assisted group member with identifying and implementing healthier coping strategies.      Plan:  Continue [facility] IOP Phase I   Aftercare:  [facility] Phase II  Program Assignments:  Personal recovery plan, relapse prevention plan, attendance of recovery support group meetings, exploration of sponsorship, drug/alcohol screens.     Oscar Suazo LCSW  1/1/2023  15:18 EST

## 2023-01-02 LAB — GABAPENTIN UR-MCNC: NEGATIVE UG/ML

## 2023-01-02 NOTE — PROGRESS NOTES
CD IOP GROUP     Date: 12/22/2022  Name: Pamela Pena    Time In: 1800   Time Out: 2100    This provider is located at [facility] located at [address] [place] [address].  The Patient is seen remotely at his/her home, using Zoom. Patient is being seen via telehealth and confirm that they are in a secure environment for this session. The patient's condition being diagnosed/treated is appropriate for telemedicine. The provider identified himself as well as his credentials. The patient gave consent to be seen remotely, and when consent is given they understand that the consent allows for patient identifiable information to be sent to a third party as needed. They may refuse to be seen remotely at any time. The electronic data is encrypted and password protected, and the patient has been advised of the potential risks to privacy not withstanding such measures.      Number of participants: 8    IOP GROUP NOTE     Data: 3 hour IOP group therapy session (Check-ins, Coping Skills, Relapse Prevention)     Check Ins: Therapist continued facilitation of rapport building strategies between group members. Therapist asked that each patient check in with home life and recovery efforts and identify triggers, cravings, and high risk situations that arise between group sessions. Therapist provided empathy and support during group session.     Session Content/Coping Skills: Check ins completed by group members. Just for Today reading reviewed and discussed with group members. Clinician explored and discussed with group members using the past to grow. The Cognitive Model and Cognitive Distortions psychoeducational material reviewed with group members (Therapist Aid). Distorted Thinking Role Play psychoeducational material reviewed and discussed. Types of Denial reviewed and discussed with group members (Lucinda, Lucinda, and Aracely (2011) pp 30-36.). Developing new thought patterns psychoeducational material  reviewed with group ([facility]). Addictive Thinking and Recovery Thinking worksheet reviewed with group members. YouTube video “Addictive thinking and self-deception” viewed and group members discussed which thoughts they have displayed.     Response: Patient attended class via Zoom. Patient participated in verbal completion of check in form. Patient during check in answered no to question \"currently or since your last group meeting have you had any suicidal thoughts or plan or intent to hurt yourself, and patient also answered no to question of \"currently or since your last group meeting, have you had any homicidal thoughts or plan or intent to hurt others\". Patient answered no to all other questions during check in.     Personal Assessment 0-10 Scale (0-none, 10-high)    Anxiety:  0   Depression:  0   Cravings: 0     Assessment:     ..  Office Visit on 2022   Component Date Value Ref Range Status   • Reference Lab Report 2022    Final                    Value:[facility]  [address] [place] [address]  Phone: [phone] or [phone]  Fax: [phone]  Alistair Cristina M.D., Medical Director    PATIENT NAME                                     LABORATORY NO.  ESSIE KEENAN                                  [id number]  [id number]                                 AGE                    SEX   SSN              CLIENT REF #  [facility]                        22        2000      F     [id number]      [id number]    [address] [facility] 3          REQUESTING M.D.           ATTENDING M.D.         COPY TO.  [facility] [place] [address]                         DATE COLLECTED            DATE RECEIVED          DATE REPORTED  2022    ThinPrep Pap with [facility]    DIAGNOSIS:  Negative for intraepithelial lesion or  malignancy    Multiple factors can influence accuracy of                           Pap tests; therefore, screening at  regular intervals is necessary for early cancer detection.      SPECIMEN ADEQUACY:  SATISFACTORY FOR EVALUATION  Transformation zone is present.  SOURCE OF SPECIMEN:       CERVICAL  SLIDES:  1  CLINICAL HISTORY:  Encounter for gynecological examination without abnormal finding  Vaginal discharge    Chlamydia / Gonorrhea  CHLAMYDIA TRACHOMATIS: Negative  NEISSERIA GONORRHOEAE: Negative    The Aptima Combo 2 assay is a target amplification nucleic acid probe test that  utilizes target capture for the in vitro qualitative detection and differentiation of  ribosomal RNA from Chlamydia trachomatis and Neisseria gonorrhoeae to aid  in the diagnosis of chlamdial and gonococcal disease using the [place] system.    CYTOTECHNOLOGIST:             ALEX LOCO (ASCP)    CPT CODES:  42502, 00110, 60032     • Atopobium Vaginae 12/22/2022 Moderate - 1  Score Final   • BVAB 2 12/22/2022 Moderate - 1  Score Final   • Megasphaera 1 12/22/2022 Low - 0  Score Final    Comment: Calculate total score by adding the 3 individual bacterial  vaginosis (BV) marker scores together.  Total score is  interpreted as follows:  Total score 0-1: Indicates the absence of BV.  Total score   2: Indeterminate for BV. Additional clinical                   data should be evaluated to establish a                   diagnosis.  Total score 3-6: Indicates the presence of BV.  This test was developed and its performance characteristics  determined by [facility].  It has not been cleared or approved  by the Food and Drug Administration.     • Ronna Albicans, МАРИНА 12/22/2022 Negative  Negative Final   • Ronna Glabrata, МАРИНА 12/22/2022 Negative  Negative Final   • Trichomonas vaginosis 12/22/2022 Negative  Negative Final   • Chlamydia trachomatis, МАРИНА 12/22/2022 Negative  Negative Final   • Neisseria gonorrhoeae, МАРИНА 12/22/2022 Negative  Negative Final    Lab on 12/21/2022   Component Date Value Ref Range Status   • Gabapentin, Ur 12/21/2022 Negative  ug/mL Final   • THC, Screen, Urine 12/21/2022 Negative  Negative Final   • Phencyclidine (PCP), Urine 12/21/2022 Negative  Negative Final   • Cocaine Screen, Urine 12/21/2022 Negative  Negative Final   • Methamphetamine, Ur 12/21/2022 Negative  Negative Final   • Opiate Screen 12/21/2022 Negative  Negative Final   • Amphetamine Screen, Urine 12/21/2022 Negative  Negative Final   • Benzodiazepine Screen, Urine 12/21/2022 Negative  Negative Final   • Tricyclic Antidepressants Screen 12/21/2022 Negative  Negative Final   • Methadone Screen, Urine 12/21/2022 Negative  Negative Final   • Barbiturates Screen, Urine 12/21/2022 Negative  Negative Final   • Oxycodone Screen, Urine 12/21/2022 Negative  Negative Final   • Propoxyphene Screen 12/21/2022 Negative  Negative Final   • Buprenorphine, Screen, Urine 12/21/2022 Negative  Negative Final   • Ethanol, Urine 12/21/2022 Negative  Cutoff=0.020 % Final   Lab on 12/13/2022   Component Date Value Ref Range Status   • Gabapentin, Ur 12/13/2022 Negative  ug/mL Final   • HCG, Urine QL 12/13/2022 Negative  Negative Final   • THC, Screen, Urine 12/13/2022 Negative  Negative Final   • Phencyclidine (PCP), Urine 12/13/2022 Negative  Negative Final   • Cocaine Screen, Urine 12/13/2022 Negative  Negative Final   • Methamphetamine, Ur 12/13/2022 Negative  Negative Final   • Opiate Screen 12/13/2022 Negative  Negative Final   • Amphetamine Screen, Urine 12/13/2022 Negative  Negative Final   • Benzodiazepine Screen, Urine 12/13/2022 Negative  Negative Final   • Tricyclic Antidepressants Screen 12/13/2022 Negative  Negative Final   • Methadone Screen, Urine 12/13/2022 Negative  Negative Final   • Barbiturates Screen, Urine 12/13/2022 Negative  Negative Final   • Oxycodone Screen, Urine 12/13/2022 Negative  Negative Final   • Propoxyphene Screen 12/13/2022 Negative  Negative Final   •  Buprenorphine, Screen, Urine 12/13/2022 Negative  Negative Final   • Ethanol, Urine 12/13/2022 Negative  Cutoff=0.020 % Final   Admission on 12/03/2022, Discharged on 12/03/2022   Component Date Value Ref Range Status   • COVID19 12/03/2022 Not Detected  Not Detected - Ref. Range Final   • Influenza A PCR 12/03/2022 Detected (A)  Not Detected Final   • Influenza B PCR 12/03/2022 Not Detected  Not Detected Final   • HCG, Urine QL 12/03/2022 Negative  Negative Final   • Strep A Ag 12/03/2022 Negative  Negative Final   • Throat Culture, Beta Strep 12/03/2022 No Beta Hemolytic Streptococcus Isolated   Final       Mental Status Exam  Hygiene:  good  Dress: casual  Attitude: cooperative and agreeable   Motor Activity: appropriate  Eye Contact:  good  Speech: regular rate and rhythm   Mood:  calm and cooperative  Affect:  Appropriate  Thought Processes:  Linear  Thought Content:  Normal  Suicidal Thoughts:  denies  Homicidal Thoughts:  denies  Crisis Safety Plan: Safety plan has been discussed.   Hallucinations:  Unknown to clinician.   Reliability: fair  Insight: fair  Judgement: fair  Impulse Control: fair    Recovery/spiritual support group attendance: No.      Progress toward goal: Not at goal    Prognosis: Fair with Ongoing Treatment     Self-reported number of days sober: Patient reported 26 days during check in.     Patient will contact this office, call 911 or present to the nearest emergency room should suicidal or homicidal ideations occur.    Impression/Formulation:    ICD-10-CM ICD-9-CM   1. Methamphetamine use disorder, severe, dependence (HCC)  F15.20 304.40   2. Sedative, hypnotic or anxiolytic use disorder, mild, abuse (HCC)  F13.10 305.40   3. Alcohol use disorder, mild, abuse  F10.10 305.00       Clinical Maneuvering/Interventions: Therapist utilized a person-centered approach to build rapport with group member. Therapist implemented motivational interviewing techniques to assist client with exploring and  resolving ambivalence associated with commitment to change behaviors related to substance use and addiction. Therapist applied cognitive behavioral strategies to facilitate identification of maladaptive patterns of thinking and behavior that contribute to client's risk for continued substance use and relapse. Therapist employed group interaction activities to build rapport among group members, promote sobriety, and emphasize relapse prevention. Therapist promoted safe nonjudgmental environment by providing group members with unconditional positive regard and encouraging group members to comply with group rules and guidelines. Therapist assisted group member with identifying and implementing healthier coping strategies.      Plan:  Continue [facility] IOP Phase I   Aftercare:  [facility] Phase II  Program Assignments:  Personal recovery plan, relapse prevention plan, attendance of recovery support group meetings, exploration of sponsorship, drug/alcohol screens.     Oscar Suazo LCSW  1/2/2023  10:31 EST

## 2023-01-02 NOTE — PROGRESS NOTES
CD IOP GROUP     Date: 12/21/2022  Name: Pamela Pena    Time In: 1800   Time Out: 2032    Number of participants: 8    IOP GROUP NOTE     Data: 3 hour IOP group therapy session (Check-ins, Coping Skills, Relapse Prevention)     Check Ins: Therapist continued facilitation of rapport building strategies between group members. Therapist asked that each patient check in with home life and recovery efforts and identify triggers, cravings, and high risk situations that arise between group sessions. Therapist provided empathy and support during group session.     Session Content/Coping Skills: Check ins completed by group members.  shared Just for Today reading. Living in Balance- Session 6- Spirituality reviewed and discussed. Clinician discussed with group members Serenity Prayer and Higher Power. Group members developed and shared their “Higher Power” boxes.    Response: Patient attended class in person. Patient participated in completion of check in form. Patient on check in form answered no to question \"currently or since last group meeting have you had any suicidal thoughts or plan or intent to hurt yourself”, and patient also answered no on check in form to question of \"currently or since your last group meeting, have you had any homicidal thoughts or plan or intent to hurt others\".     Personal Assessment 0-10 Scale (0-none, 10-high)    Anxiety:  0   Depression:  0   Cravings: 0     Assessment:     ..  Lab on 12/21/2022   Component Date Value Ref Range Status   • Gabapentin, Ur 12/21/2022 Negative  ug/mL Final   • THC, Screen, Urine 12/21/2022 Negative  Negative Final   • Phencyclidine (PCP), Urine 12/21/2022 Negative  Negative Final   • Cocaine Screen, Urine 12/21/2022 Negative  Negative Final   • Methamphetamine, Ur 12/21/2022 Negative  Negative Final   • Opiate Screen 12/21/2022 Negative  Negative Final   • Amphetamine Screen, Urine 12/21/2022 Negative  Negative Final   • Benzodiazepine  Screen, Urine 12/21/2022 Negative  Negative Final   • Tricyclic Antidepressants Screen 12/21/2022 Negative  Negative Final   • Methadone Screen, Urine 12/21/2022 Negative  Negative Final   • Barbiturates Screen, Urine 12/21/2022 Negative  Negative Final   • Oxycodone Screen, Urine 12/21/2022 Negative  Negative Final   • Propoxyphene Screen 12/21/2022 Negative  Negative Final   • Buprenorphine, Screen, Urine 12/21/2022 Negative  Negative Final   • Ethanol, Urine 12/21/2022 Negative  Cutoff=0.020 % Final   Lab on 12/13/2022   Component Date Value Ref Range Status   • Gabapentin, Ur 12/13/2022 Negative  ug/mL Final   • HCG, Urine QL 12/13/2022 Negative  Negative Final   • THC, Screen, Urine 12/13/2022 Negative  Negative Final   • Phencyclidine (PCP), Urine 12/13/2022 Negative  Negative Final   • Cocaine Screen, Urine 12/13/2022 Negative  Negative Final   • Methamphetamine, Ur 12/13/2022 Negative  Negative Final   • Opiate Screen 12/13/2022 Negative  Negative Final   • Amphetamine Screen, Urine 12/13/2022 Negative  Negative Final   • Benzodiazepine Screen, Urine 12/13/2022 Negative  Negative Final   • Tricyclic Antidepressants Screen 12/13/2022 Negative  Negative Final   • Methadone Screen, Urine 12/13/2022 Negative  Negative Final   • Barbiturates Screen, Urine 12/13/2022 Negative  Negative Final   • Oxycodone Screen, Urine 12/13/2022 Negative  Negative Final   • Propoxyphene Screen 12/13/2022 Negative  Negative Final   • Buprenorphine, Screen, Urine 12/13/2022 Negative  Negative Final   • Ethanol, Urine 12/13/2022 Negative  Cutoff=0.020 % Final   Admission on 12/03/2022, Discharged on 12/03/2022   Component Date Value Ref Range Status   • COVID19 12/03/2022 Not Detected  Not Detected - Ref. Range Final   • Influenza A PCR 12/03/2022 Detected (A)  Not Detected Final   • Influenza B PCR 12/03/2022 Not Detected  Not Detected Final   • HCG, Urine QL 12/03/2022 Negative  Negative Final   • Strep A Ag 12/03/2022 Negative   Negative Final   • Throat Culture, Beta Strep 12/03/2022 No Beta Hemolytic Streptococcus Isolated   Final       Mental Status Exam  Hygiene:  good  Dress: casual  Attitude: cooperative and agreeable   Motor Activity: appropriate  Eye Contact:  good  Speech: regular rate and rhythm   Mood:  calm and cooperative  Affect:  Appropriate  Thought Processes:  Linear  Thought Content:  Normal  Suicidal Thoughts:  denies  Homicidal Thoughts:  denies  Crisis Safety Plan: Safety plan has been discussed.   Hallucinations:  Unknown to clinician.   Reliability: fair  Insight: fair  Judgement: fair  Impulse Control: fair    Recovery/spiritual support group attendance: No.     Progress toward goal: Not at goal    Prognosis: Fair with Ongoing Treatment     Self-reported number of days sober: Patient reported 25 days on check in form.     Patient will contact this office, call 911 or present to the nearest emergency room should suicidal or homicidal ideations occur.    Impression/Formulation:    ICD-10-CM ICD-9-CM   1. Methamphetamine use disorder, severe, dependence (HCC)  F15.20 304.40   2. Sedative, hypnotic or anxiolytic use disorder, mild, abuse (HCC)  F13.10 305.40   3. Alcohol use disorder, mild, abuse  F10.10 305.00       Clinical Maneuvering/Interventions: Therapist utilized a person-centered approach to build rapport with group member. Therapist implemented motivational interviewing techniques to assist client with exploring and resolving ambivalence associated with commitment to change behaviors related to substance use and addiction. Therapist applied cognitive behavioral strategies to facilitate identification of maladaptive patterns of thinking and behavior that contribute to client's risk for continued substance use and relapse. Therapist employed group interaction activities to build rapport among group members, promote sobriety, and emphasize relapse prevention. Therapist promoted safe nonjudgmental environment by  providing group members with unconditional positive regard and encouraging group members to comply with group rules and guidelines. Therapist assisted group member with identifying and implementing healthier coping strategies.      Plan:  Continue [facility] IOP Phase I   Aftercare:  [facility] Phase II  Program Assignments:  Personal recovery plan, relapse prevention plan, attendance of recovery support group meetings, exploration of sponsorship, drug/alcohol screens.     Oscar Suazo LCSW  1/2/2023  10:57 EST

## 2023-01-05 ENCOUNTER — DOCUMENTATION (OUTPATIENT)
Dept: PSYCHIATRY | Facility: HOSPITAL | Age: 23
End: 2023-01-05
Payer: COMMERCIAL

## 2023-01-05 ENCOUNTER — OFFICE VISIT (OUTPATIENT)
Dept: PSYCHIATRY | Facility: HOSPITAL | Age: 23
End: 2023-01-05
Payer: COMMERCIAL

## 2023-01-05 DIAGNOSIS — F15.20 METHAMPHETAMINE USE DISORDER, SEVERE, DEPENDENCE: Primary | ICD-10-CM

## 2023-01-05 DIAGNOSIS — F13.10 SEDATIVE, HYPNOTIC OR ANXIOLYTIC USE DISORDER, MILD, ABUSE: ICD-10-CM

## 2023-01-05 DIAGNOSIS — F10.10 ALCOHOL USE DISORDER, MILD, ABUSE: ICD-10-CM

## 2023-01-05 PROCEDURE — H0015 ALCOHOL AND/OR DRUG SERVICES: HCPCS | Performed by: NURSE PRACTITIONER

## 2023-01-05 NOTE — PROGRESS NOTES
Clinician this date called patient at 02:17 PM, [phone]. Patient answered and stated she has not been able to get a ride to [employer]. Clinician asked patient about doing zoom and patient stated that she could. Patient stated she would complete screen tomorrow. Patient stated that we could set up transportation for next week.     -Oscar Suazo, LINA  1/5/2023

## 2023-01-09 ENCOUNTER — OFFICE VISIT (OUTPATIENT)
Dept: PSYCHIATRY | Facility: HOSPITAL | Age: 23
End: 2023-01-09
Payer: COMMERCIAL

## 2023-01-09 DIAGNOSIS — F13.10 SEDATIVE, HYPNOTIC OR ANXIOLYTIC USE DISORDER, MILD, ABUSE: ICD-10-CM

## 2023-01-09 DIAGNOSIS — F15.20 METHAMPHETAMINE USE DISORDER, SEVERE, DEPENDENCE: Primary | ICD-10-CM

## 2023-01-09 DIAGNOSIS — F10.10 ALCOHOL USE DISORDER, MILD, ABUSE: ICD-10-CM

## 2023-01-09 PROCEDURE — H0015 ALCOHOL AND/OR DRUG SERVICES: HCPCS | Performed by: NURSE PRACTITIONER

## 2023-01-11 ENCOUNTER — OFFICE VISIT (OUTPATIENT)
Dept: PSYCHIATRY | Facility: HOSPITAL | Age: 23
End: 2023-01-11
Payer: COMMERCIAL

## 2023-01-11 ENCOUNTER — DOCUMENTATION (OUTPATIENT)
Dept: PSYCHIATRY | Facility: HOSPITAL | Age: 23
End: 2023-01-11
Payer: COMMERCIAL

## 2023-01-11 DIAGNOSIS — F15.20 METHAMPHETAMINE USE DISORDER, SEVERE, DEPENDENCE: Primary | ICD-10-CM

## 2023-01-11 DIAGNOSIS — F10.10 ALCOHOL USE DISORDER, MILD, ABUSE: ICD-10-CM

## 2023-01-11 DIAGNOSIS — F13.10 SEDATIVE, HYPNOTIC OR ANXIOLYTIC USE DISORDER, MILD, ABUSE: ICD-10-CM

## 2023-01-11 PROCEDURE — H0015 ALCOHOL AND/OR DRUG SERVICES: HCPCS | Performed by: NURSE PRACTITIONER

## 2023-01-11 NOTE — PROGRESS NOTES
Patient had expressed during CD-IOP needing transportation to class ongoing. Clinician on 1/11/2023 called cab company to arrange transportation and left patient phone number for them to call if she is not outside for . Clinician spoke with patient to provide update. Patient stated today she would be at a different address. Clinician updated this with cab company.      -Oscar Suazo LCSW

## 2023-01-12 ENCOUNTER — OFFICE VISIT (OUTPATIENT)
Dept: PSYCHIATRY | Facility: HOSPITAL | Age: 23
End: 2023-01-12
Payer: COMMERCIAL

## 2023-01-12 ENCOUNTER — LAB (OUTPATIENT)
Dept: LAB | Facility: HOSPITAL | Age: 23
End: 2023-01-12
Payer: COMMERCIAL

## 2023-01-12 ENCOUNTER — OFFICE VISIT (OUTPATIENT)
Dept: PSYCHIATRY | Facility: CLINIC | Age: 23
End: 2023-01-12
Payer: COMMERCIAL

## 2023-01-12 VITALS
DIASTOLIC BLOOD PRESSURE: 68 MMHG | SYSTOLIC BLOOD PRESSURE: 122 MMHG | HEART RATE: 76 BPM | BODY MASS INDEX: 27.82 KG/M2 | WEIGHT: 167 LBS | HEIGHT: 65 IN

## 2023-01-12 DIAGNOSIS — F31.32 BIPOLAR AFFECTIVE DISORDER, CURRENTLY DEPRESSED, MODERATE: Primary | ICD-10-CM

## 2023-01-12 DIAGNOSIS — F10.10 ALCOHOL USE DISORDER, MILD, ABUSE: ICD-10-CM

## 2023-01-12 DIAGNOSIS — F13.10 SEDATIVE, HYPNOTIC OR ANXIOLYTIC USE DISORDER, MILD, ABUSE: ICD-10-CM

## 2023-01-12 DIAGNOSIS — F15.21 METHAMPHETAMINE USE DISORDER, SEVERE, IN EARLY REMISSION: ICD-10-CM

## 2023-01-12 DIAGNOSIS — F15.20 METHAMPHETAMINE USE DISORDER, SEVERE, DEPENDENCE: Primary | ICD-10-CM

## 2023-01-12 DIAGNOSIS — Z56.0 PROBLEM WITH BEING UNEMPLOYED: ICD-10-CM

## 2023-01-12 PROCEDURE — H0015 ALCOHOL AND/OR DRUG SERVICES: HCPCS | Performed by: NURSE PRACTITIONER

## 2023-01-12 PROCEDURE — 99214 OFFICE O/P EST MOD 30 MIN: CPT | Performed by: NURSE PRACTITIONER

## 2023-01-12 PROCEDURE — G0480 DRUG TEST DEF 1-7 CLASSES: HCPCS

## 2023-01-12 PROCEDURE — 80307 DRUG TEST PRSMV CHEM ANLYZR: CPT

## 2023-01-12 RX ORDER — ARIPIPRAZOLE 5 MG/1
5 TABLET ORAL DAILY
Qty: 30 TABLET | Refills: 0 | Status: SHIPPED | OUTPATIENT
Start: 2023-01-12

## 2023-01-12 SDOH — ECONOMIC STABILITY - INCOME SECURITY: UNEMPLOYMENT, UNSPECIFIED: Z56.0
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
## 2023-01-13 LAB — ETHANOL UR-MCNC: NEGATIVE %

## 2023-01-15 LAB — GABAPENTIN UR-MCNC: NEGATIVE UG/ML

## 2023-01-16 ENCOUNTER — OFFICE VISIT (OUTPATIENT)
Dept: PSYCHIATRY | Facility: HOSPITAL | Age: 23
End: 2023-01-16
Payer: COMMERCIAL

## 2023-01-16 DIAGNOSIS — F13.10 SEDATIVE, HYPNOTIC OR ANXIOLYTIC USE DISORDER, MILD, ABUSE: ICD-10-CM

## 2023-01-16 DIAGNOSIS — F10.10 ALCOHOL USE DISORDER, MILD, ABUSE: ICD-10-CM

## 2023-01-16 DIAGNOSIS — F15.20 METHAMPHETAMINE USE DISORDER, SEVERE, DEPENDENCE: Primary | ICD-10-CM

## 2023-01-16 PROCEDURE — H0015 ALCOHOL AND/OR DRUG SERVICES: HCPCS | Performed by: NURSE PRACTITIONER

## 2023-01-18 ENCOUNTER — LAB (OUTPATIENT)
Dept: LAB | Facility: HOSPITAL | Age: 23
End: 2023-01-18
Payer: COMMERCIAL

## 2023-01-18 ENCOUNTER — OFFICE VISIT (OUTPATIENT)
Dept: PSYCHIATRY | Facility: HOSPITAL | Age: 23
End: 2023-01-18
Payer: COMMERCIAL

## 2023-01-18 DIAGNOSIS — F13.10 SEDATIVE, HYPNOTIC OR ANXIOLYTIC USE DISORDER, MILD, ABUSE: ICD-10-CM

## 2023-01-18 DIAGNOSIS — F10.10 ALCOHOL USE DISORDER, MILD, ABUSE: ICD-10-CM

## 2023-01-18 DIAGNOSIS — F15.20 METHAMPHETAMINE USE DISORDER, SEVERE, DEPENDENCE: Primary | ICD-10-CM

## 2023-01-18 DIAGNOSIS — F15.21 METHAMPHETAMINE USE DISORDER, SEVERE, IN EARLY REMISSION: ICD-10-CM

## 2023-01-18 PROCEDURE — 80307 DRUG TEST PRSMV CHEM ANLYZR: CPT

## 2023-01-18 PROCEDURE — G0480 DRUG TEST DEF 1-7 CLASSES: HCPCS

## 2023-01-18 PROCEDURE — H0015 ALCOHOL AND/OR DRUG SERVICES: HCPCS | Performed by: NURSE PRACTITIONER

## 2023-01-19 ENCOUNTER — OFFICE VISIT (OUTPATIENT)
Dept: PSYCHIATRY | Facility: HOSPITAL | Age: 23
End: 2023-01-19
Payer: COMMERCIAL

## 2023-01-19 DIAGNOSIS — F10.10 ALCOHOL USE DISORDER, MILD, ABUSE: ICD-10-CM

## 2023-01-19 DIAGNOSIS — F13.10 SEDATIVE, HYPNOTIC OR ANXIOLYTIC USE DISORDER, MILD, ABUSE: ICD-10-CM

## 2023-01-19 DIAGNOSIS — F15.20 METHAMPHETAMINE USE DISORDER, SEVERE, DEPENDENCE: Primary | ICD-10-CM

## 2023-01-19 PROCEDURE — H0015 ALCOHOL AND/OR DRUG SERVICES: HCPCS | Performed by: NURSE PRACTITIONER

## 2023-01-20 LAB — ETHANOL UR-MCNC: NEGATIVE %

## 2023-01-22 LAB — GABAPENTIN UR-MCNC: NEGATIVE UG/ML

## 2023-01-23 ENCOUNTER — OFFICE VISIT (OUTPATIENT)
Dept: PSYCHIATRY | Facility: HOSPITAL | Age: 23
End: 2023-01-23
Payer: COMMERCIAL

## 2023-01-23 DIAGNOSIS — F10.10 ALCOHOL USE DISORDER, MILD, ABUSE: ICD-10-CM

## 2023-01-23 DIAGNOSIS — F15.20 METHAMPHETAMINE USE DISORDER, SEVERE, DEPENDENCE: Primary | ICD-10-CM

## 2023-01-23 DIAGNOSIS — F13.10 SEDATIVE, HYPNOTIC OR ANXIOLYTIC USE DISORDER, MILD, ABUSE: ICD-10-CM

## 2023-01-23 PROCEDURE — H0015 ALCOHOL AND/OR DRUG SERVICES: HCPCS | Performed by: NURSE PRACTITIONER

## 2023-01-23 NOTE — PROGRESS NOTES
"[facility] IOP GROUP     Date: 01/11/2023  Name: Pamela Pena    Time In: 1800   Time Out: 2055    Number of participants: 7    IOP GROUP NOTE     Data: 3 hour IOP group therapy session (Check-ins, Coping Skills, Relapse Prevention)     Check Ins: Therapist continued facilitation of rapport building strategies between group members. Therapist asked that each patient check in with home life and recovery efforts and identify triggers, cravings, and high risk situations that arise between group sessions. Therapist provided empathy and support during group session.     Session Content/Coping Skills: Introductions completed. Check ins completed by group members.  shared Just for Today reading. Clinician educated group members on the history of the opioid epidemic. Dimitri Facts about Heroin psychoeducational material reviewed and discussed. Group members viewed “Facts about Fentanyl” from [employer] website, and clinician discussed with group members how Carfentanyl is stronger than Fentanyl. Group members viewed “overdose death rates involving opioids” from [employer] website. [employer] Documentary “American Epidemic” ([employer]) viewed and discussed.     Response: Patient attended class in person. Patient participated in completion of check in form. Patient on check in form answered no to question \"currently or since last group meeting have you had any suicidal thoughts or plan or intent to hurt yourself”, and patient also answered no on check in form to question of \"currently or since your last group meeting, have you had any homicidal thoughts or plan or intent to hurt others\".     Personal Assessment 0-10 Scale (0-none, 10-high)    Anxiety:  2   Depression:  2   Cravings: 0     Assessment:     ..  Lab on 12/28/2022   Component Date Value Ref Range Status   • Gabapentin, Ur 12/28/2022 Negative  ug/mL Final   • THC, Screen, Urine 12/28/2022 Negative  Negative Final   • Phencyclidine (PCP), Urine 12/28/2022 " Negative  Negative Final   • Cocaine Screen, Urine 2022 Negative  Negative Final   • Methamphetamine, Ur 2022 Negative  Negative Final   • Opiate Screen 2022 Negative  Negative Final   • Amphetamine Screen, Urine 2022 Negative  Negative Final   • Benzodiazepine Screen, Urine 2022 Negative  Negative Final   • Tricyclic Antidepressants Screen 2022 Negative  Negative Final   • Methadone Screen, Urine 2022 Negative  Negative Final   • Barbiturates Screen, Urine 2022 Negative  Negative Final   • Oxycodone Screen, Urine 2022 Negative  Negative Final   • Propoxyphene Screen 2022 Negative  Negative Final   • Buprenorphine, Screen, Urine 2022 Negative  Negative Final   • Ethanol, Urine 2022 Negative  Cutoff=0.020 % Final   Office Visit on 2022   Component Date Value Ref Range Status   • Reference Lab Report 2022    Final                    Value:[facility]  [address] [place] [address]  Phone: [phone] or [phone]  Fax: [phone]  Alistair Cristina M.D., Medical Director    PATIENT NAME                                     LABORATORY NO.  ESSIE KEENAN                                  [id number]  [id number]                                 AGE                    SEX   SSN              CLIENT REF #  [facility]AN                        22        2000      F     [id number]      [id number]    [address] [facility] [address]          REQUESTING M.D.           ATTENDING M.D.         COPY TO.  [place] [address]                         DATE COLLECTED            DATE RECEIVED          DATE REPORTED  2022    ThinPrep Pap with [facility]    DIAGNOSIS:  Negative for intraepithelial lesion or malignancy    Multiple factors can influence accuracy of                            Pap tests; therefore, screening at  regular intervals is necessary for early cancer detection.      SPECIMEN ADEQUACY:  SATISFACTORY FOR EVALUATION  Transformation zone is present.  SOURCE OF SPECIMEN:       CERVICAL  SLIDES:  1  CLINICAL HISTORY:  Encounter for gynecological examination without abnormal finding  Vaginal discharge    Chlamydia / Gonorrhea  CHLAMYDIA TRACHOMATIS: Negative  NEISSERIA GONORRHOEAE: Negative    The Aptima Combo 2 assay is a target amplification nucleic acid probe test that  utilizes target capture for the in vitro qualitative detection and differentiation of  ribosomal RNA from Chlamydia trachomatis and Neisseria gonorrhoeae to aid  in the diagnosis of chlamdial and gonococcal disease using the [place] system.    CYTOTECHNOLOGIST:             ALEX LOCO (ASCP)    CPT CODES:  93783, 06301, 57384     • Atopobium Vaginae 12/22/2022 Moderate - 1  Score Final   • BVAB 2 12/22/2022 Moderate - 1  Score Final   • Megasphaera 1 12/22/2022 Low - 0  Score Final    Comment: Calculate total score by adding the 3 individual bacterial  vaginosis (BV) marker scores together.  Total score is  interpreted as follows:  Total score 0-1: Indicates the absence of BV.  Total score   2: Indeterminate for BV. Additional clinical                   data should be evaluated to establish a                   diagnosis.  Total score 3-6: Indicates the presence of BV.  This test was developed and its performance characteristics  determined by [facility].  It has not been cleared or approved  by the Food and Drug Administration.     • Ronna Albicans, МАРИНА 12/22/2022 Negative  Negative Final   • Ronna Glabrata, МАРИНА 12/22/2022 Negative  Negative Final   • Trichomonas vaginosis 12/22/2022 Negative  Negative Final   • Chlamydia trachomatis, МАРИНА 12/22/2022 Negative  Negative Final   • Neisseria gonorrhoeae, МАРИНА 12/22/2022 Negative  Negative Final       Mental Status Exam  Hygiene:  good  Dress: casual  Attitude:  cooperative and agreeable   Motor Activity: appropriate  Eye Contact:  good  Speech: regular rate and rhythm   Mood:  calm and cooperative  Affect:  Appropriate  Thought Processes:  Linear  Thought Content:  Normal  Suicidal Thoughts:  denies  Homicidal Thoughts:  denies  Crisis Safety Plan: Safety plan has been discussed.   Hallucinations:  Unknown to clinician.   Reliability: fair  Insight: fair  Judgement: fair  Impulse Control: fair    Recovery/spiritual support group attendance: No.      Progress toward goal: Not at goal    Prognosis: Fair with Ongoing Treatment     Self-reported number of days sober: Patient reported 33 days on check in form.     Patient will contact this office, call 911 or present to the nearest emergency room should suicidal or homicidal ideations occur.    Impression/Formulation:    ICD-10-CM ICD-9-CM   1. Methamphetamine use disorder, severe, dependence (HCC)  F15.20 304.40   2. Sedative, hypnotic or anxiolytic use disorder, mild, abuse (HCC)  F13.10 305.40   3. Alcohol use disorder, mild, abuse  F10.10 305.00       Clinical Maneuvering/Interventions: Therapist utilized a person-centered approach to build rapport with group member. Therapist implemented motivational interviewing techniques to assist client with exploring and resolving ambivalence associated with commitment to change behaviors related to substance use and addiction. Therapist applied cognitive behavioral strategies to facilitate identification of maladaptive patterns of thinking and behavior that contribute to client's risk for continued substance use and relapse. Therapist employed group interaction activities to build rapport among group members, promote sobriety, and emphasize relapse prevention. Therapist promoted safe nonjudgmental environment by providing group members with unconditional positive regard and encouraging group members to comply with group rules and guidelines. Therapist assisted group member with  identifying and implementing healthier coping strategies.      Plan:  Continue [facility] IOP Phase I   Aftercare:  [facility] Phase II  Program Assignments:  Personal recovery plan, relapse prevention plan, attendance of recovery support group meetings, exploration of sponsorship, drug/alcohol screens.     Oscar Suazo LCSW  1/23/2023  14:05 EST

## 2023-01-23 NOTE — PROGRESS NOTES
CD IOP GROUP     Date: 01/05/2023  Name: Pamela Pena    Time In: 1800   Time Out: 2044    This provider is located at [facility] located at [address] [place] [address].  The Patient is seen remotely at his/her home, using Zoom. Patient is being seen via telehealth and confirm that they are in a secure environment for this session. The patient's condition being diagnosed/treated is appropriate for telemedicine. The provider identified himself as well as his credentials. The patient gave consent to be seen remotely, and when consent is given they understand that the consent allows for patient identifiable information to be sent to a third party as needed. They may refuse to be seen remotely at any time. The electronic data is encrypted and password protected, and the patient has been advised of the potential risks to privacy not withstanding such measures.      Number of participants: 6    IOP GROUP NOTE     Data: 3 hour IOP group therapy session (Check-ins, Coping Skills, Relapse Prevention)     Check Ins: Therapist continued facilitation of rapport building strategies between group members. Therapist asked that each patient check in with home life and recovery efforts and identify triggers, cravings, and high risk situations that arise between group sessions. Therapist provided empathy and support during group session.     Session Content/Coping Skills: Check ins completed by group members. Clinician met with each group member individually to review UDS and to check in. What’s on your phone icebreaker activity completed. Article “how and why to write a great cover letter” reviewed. Clinician educated group members on how to access a cover letter template on [employer] Article “10 common interview questions and how to answer them” reviewed. Group members who attended in person practiced mock interviews.     Response: Patient attended class via Zoom. Patient participated in verbal  completion of check in form. Patient during check in denied suicidal or homicidal thoughts. Patient participated in group discussions.     Personal Assessment 0-10 Scale (0-none, 10-high)    Anxiety:  5   Depression:  5   Cravings: 0     Assessment:     ..  Lab on 2022   Component Date Value Ref Range Status   • Gabapentin, Ur 2022 Negative  ug/mL Final   • THC, Screen, Urine 2022 Negative  Negative Final   • Phencyclidine (PCP), Urine 2022 Negative  Negative Final   • Cocaine Screen, Urine 2022 Negative  Negative Final   • Methamphetamine, Ur 2022 Negative  Negative Final   • Opiate Screen 2022 Negative  Negative Final   • Amphetamine Screen, Urine 2022 Negative  Negative Final   • Benzodiazepine Screen, Urine 2022 Negative  Negative Final   • Tricyclic Antidepressants Screen 2022 Negative  Negative Final   • Methadone Screen, Urine 2022 Negative  Negative Final   • Barbiturates Screen, Urine 2022 Negative  Negative Final   • Oxycodone Screen, Urine 2022 Negative  Negative Final   • Propoxyphene Screen 2022 Negative  Negative Final   • Buprenorphine, Screen, Urine 2022 Negative  Negative Final   • Ethanol, Urine 2022 Negative  Cutoff=0.020 % Final   Office Visit on 2022   Component Date Value Ref Range Status   • Reference Lab Report 2022    Final                    Value:[facility]  [address] [place] [address]  Phone: [phone] or [phone]  Fax: [phone]  Alistair Cristina M.D., Medical Director    PATIENT NAME                                     LABORATORY NO.  [id number]   ESSIE LARKIN                                  [id number]  [id number]                                 AGE                    SEX   SSN              CLIENT REF #  [facility] BILL                        22        2000      F     [id number]      [id number]    [address] [facility]  [address]          REQUESTING MMarcianoD.           ATTENDING M.[place] [address]                         DATE COLLECTED            DATE RECEIVED          DATE REPORTED  12/22/2022 12/22/2022 12/30/2022    ThinPrep Pap with [facility]    DIAGNOSIS:  Negative for intraepithelial lesion or malignancy    Multiple factors can influence accuracy of                           Pap tests; therefore, screening at  regular intervals is necessary for early cancer detection.      SPECIMEN ADEQUACY:  SATISFACTORY FOR EVALUATION  Transformation zone is present.  SOURCE OF SPECIMEN:       CERVICAL  SLIDES:  1  CLINICAL HISTORY:  Encounter for gynecological examination without abnormal finding  Vaginal discharge    Chlamydia / Gonorrhea  CHLAMYDIA TRACHOMATIS: Negative  NEISSERIA GONORRHOEAE: Negative    The Aptima Combo 2 assay is a target amplification nucleic acid probe test that  utilizes target capture for the in vitro qualitative detection and differentiation of  ribosomal RNA from Chlamydia trachomatis and Neisseria gonorrhoeae to aid  in the diagnosis of chlamdial and gonococcal disease using the [place] system.    CYTOTECHNOLOGIST:             ALEX LOCO (ASCP)    CPT CODES:  27916, 88649, 23350     • Atopobium Vaginae 12/22/2022 Moderate - 1  Score Final   • BVAB 2 12/22/2022 Moderate - 1  Score Final   • Megasphaera 1 12/22/2022 Low - 0  Score Final    Comment: Calculate total score by adding the 3 individual bacterial  vaginosis (BV) marker scores together.  Total score is  interpreted as follows:  Total score 0-1: Indicates the absence of BV.  Total score   2: Indeterminate for BV. Additional clinical                   data should be evaluated to establish a                   diagnosis.  Total score 3-6: Indicates the presence of BV.  This test was developed and its performance characteristics  determined by  [facility].  It has not been cleared or approved  by the Food and Drug Administration.     • Ronna Albicans, МАРИНА 12/22/2022 Negative  Negative Final   • Ronna Glabrata, МАРИНА 12/22/2022 Negative  Negative Final   • Trichomonas vaginosis 12/22/2022 Negative  Negative Final   • Chlamydia trachomatis, МАРИНА 12/22/2022 Negative  Negative Final   • Neisseria gonorrhoeae, МАРИНА 12/22/2022 Negative  Negative Final   Lab on 12/21/2022   Component Date Value Ref Range Status   • Gabapentin, Ur 12/21/2022 Negative  ug/mL Final   • THC, Screen, Urine 12/21/2022 Negative  Negative Final   • Phencyclidine (PCP), Urine 12/21/2022 Negative  Negative Final   • Cocaine Screen, Urine 12/21/2022 Negative  Negative Final   • Methamphetamine, Ur 12/21/2022 Negative  Negative Final   • Opiate Screen 12/21/2022 Negative  Negative Final   • Amphetamine Screen, Urine 12/21/2022 Negative  Negative Final   • Benzodiazepine Screen, Urine 12/21/2022 Negative  Negative Final   • Tricyclic Antidepressants Screen 12/21/2022 Negative  Negative Final   • Methadone Screen, Urine 12/21/2022 Negative  Negative Final   • Barbiturates Screen, Urine 12/21/2022 Negative  Negative Final   • Oxycodone Screen, Urine 12/21/2022 Negative  Negative Final   • Propoxyphene Screen 12/21/2022 Negative  Negative Final   • Buprenorphine, Screen, Urine 12/21/2022 Negative  Negative Final   • Ethanol, Urine 12/21/2022 Negative  Cutoff=0.020 % Final       Mental Status Exam  Hygiene:  good  Dress: casual  Attitude: cooperative and agreeable   Motor Activity: appropriate  Eye Contact:  good  Speech: regular rate and rhythm   Mood:  calm and cooperative  Affect:  Appropriate  Thought Processes:  Linear  Thought Content:  Normal  Suicidal Thoughts:  denies  Homicidal Thoughts:  denies  Crisis Safety Plan: Safety plan has been discussed.   Hallucinations:  Unknown to clinician.   Reliability: fair  Insight: fair  Judgement: fair  Impulse Control: fair    Recovery/spiritual  "support group attendance: No.      Progress toward goal: Not at goal    Prognosis: Fair with Ongoing Treatment     Self-reported number of days sober: Patient reported \"40 something\" during check in.     Patient will contact this office, call 911 or present to the nearest emergency room should suicidal or homicidal ideations occur.    Impression/Formulation:    ICD-10-CM ICD-9-CM   1. Methamphetamine use disorder, severe, dependence (HCC)  F15.20 304.40   2. Sedative, hypnotic or anxiolytic use disorder, mild, abuse (HCC)  F13.10 305.40   3. Alcohol use disorder, mild, abuse  F10.10 305.00       Clinical Maneuvering/Interventions: Therapist utilized a person-centered approach to build rapport with group member. Therapist implemented motivational interviewing techniques to assist client with exploring and resolving ambivalence associated with commitment to change behaviors related to substance use and addiction. Therapist applied cognitive behavioral strategies to facilitate identification of maladaptive patterns of thinking and behavior that contribute to client's risk for continued substance use and relapse. Therapist employed group interaction activities to build rapport among group members, promote sobriety, and emphasize relapse prevention. Therapist promoted safe nonjudgmental environment by providing group members with unconditional positive regard and encouraging group members to comply with group rules and guidelines. Therapist assisted group member with identifying and implementing healthier coping strategies.      Plan:  Continue [facility] IOP Phase I   Aftercare:  [facility] Phase II  Program Assignments:  Personal recovery plan, relapse prevention plan, attendance of recovery support group meetings, exploration of sponsorship, drug/alcohol screens.     Oscar Suazo LCSW  1/23/2023  11:06 EST  "

## 2023-01-23 NOTE — PROGRESS NOTES
"CD IOP GROUP     Date: 01/12/2023  Name: Pamela Pena    Time In: 1800   Time Out: 2100    Number of participants: 8    IOP GROUP NOTE     Data: 3 hour IOP group therapy session (Check-ins, Coping Skills, Relapse Prevention)     Check Ins: Therapist continued facilitation of rapport building strategies between group members. Therapist asked that each patient check in with home life and recovery efforts and identify triggers, cravings, and high risk situations that arise between group sessions. Therapist provided empathy and support during group session.     Session Content/Coping Skills: Check ins completed by group members. Clinician met with each member individually to discuss UDS and to check in. [place] facts about cocaine, methamphetamine, alcohol, marijuana, and Benzodiazepines psychoeducational material reviewed and discussed. Substance Abuse Crossword group activity completed. Relapse prevention plans were developed by group members and group members shared relapse prevention plans.     Response: Patient attended class in person. Patient participated in completion of check in form. Patient on check in form answered no to question \"currently or since last group meeting have you had any suicidal thoughts or plan or intent to hurt yourself”, and patient also answered no on check in form to question of \"currently or since your last group meeting, have you had any homicidal thoughts or plan or intent to hurt others\". Patient on check in form reported that she had utilized new supports since last group meeting.     Personal Assessment 0-10 Scale (0-none, 10-high)    Anxiety:  0   Depression:  0   Cravings: 0     Assessment:     ..  Lab on 01/12/2023   Component Date Value Ref Range Status   • Gabapentin, Ur 01/12/2023 Negative  ug/mL Final   • THC, Screen, Urine 01/12/2023 Negative  Negative Final   • Phencyclidine (PCP), Urine 01/12/2023 Negative  Negative Final   • Cocaine Screen, Urine 01/12/2023 Negative  " Negative Final   • Methamphetamine, Ur 01/12/2023 Negative  Negative Final   • Opiate Screen 01/12/2023 Negative  Negative Final   • Amphetamine Screen, Urine 01/12/2023 Negative  Negative Final   • Benzodiazepine Screen, Urine 01/12/2023 Negative  Negative Final   • Tricyclic Antidepressants Screen 01/12/2023 Negative  Negative Final   • Methadone Screen, Urine 01/12/2023 Negative  Negative Final   • Barbiturates Screen, Urine 01/12/2023 Negative  Negative Final   • Oxycodone Screen, Urine 01/12/2023 Negative  Negative Final   • Propoxyphene Screen 01/12/2023 Negative  Negative Final   • Buprenorphine, Screen, Urine 01/12/2023 Negative  Negative Final   • Ethanol, Urine 01/12/2023 Negative  Cutoff=0.020 % Final   Lab on 12/28/2022   Component Date Value Ref Range Status   • Gabapentin, Ur 12/28/2022 Negative  ug/mL Final   • THC, Screen, Urine 12/28/2022 Negative  Negative Final   • Phencyclidine (PCP), Urine 12/28/2022 Negative  Negative Final   • Cocaine Screen, Urine 12/28/2022 Negative  Negative Final   • Methamphetamine, Ur 12/28/2022 Negative  Negative Final   • Opiate Screen 12/28/2022 Negative  Negative Final   • Amphetamine Screen, Urine 12/28/2022 Negative  Negative Final   • Benzodiazepine Screen, Urine 12/28/2022 Negative  Negative Final   • Tricyclic Antidepressants Screen 12/28/2022 Negative  Negative Final   • Methadone Screen, Urine 12/28/2022 Negative  Negative Final   • Barbiturates Screen, Urine 12/28/2022 Negative  Negative Final   • Oxycodone Screen, Urine 12/28/2022 Negative  Negative Final   • Propoxyphene Screen 12/28/2022 Negative  Negative Final   • Buprenorphine, Screen, Urine 12/28/2022 Negative  Negative Final   • Ethanol, Urine 12/28/2022 Negative  Cutoff=0.020 % Final       Mental Status Exam  Hygiene:  good  Dress: casual  Attitude: cooperative and agreeable   Motor Activity: appropriate  Eye Contact:  good  Speech: regular rate and rhythm   Mood:  calm and cooperative  Affect:   Appropriate  Thought Processes:  Linear  Thought Content:  Normal  Suicidal Thoughts:  denies  Homicidal Thoughts:  denies  Crisis Safety Plan: Safety plan has been discussed.   Hallucinations:  Unknown to clinician.   Reliability: fair  Insight: fair  Judgement: fair  Impulse Control: fair    Recovery/spiritual support group attendance: No.      Progress toward goal: Not at goal    Prognosis: Fair with Ongoing Treatment     Self-reported number of days sober: Patient reported 34 days on check in form.     Patient will contact this office, call 911 or present to the nearest emergency room should suicidal or homicidal ideations occur.    Impression/Formulation:    ICD-10-CM ICD-9-CM   1. Methamphetamine use disorder, severe, dependence ([facility])  F15.20 304.40   2. Sedative, hypnotic or anxiolytic use disorder, mild, abuse ([facility])  F13.10 305.40   3. Alcohol use disorder, mild, abuse  F10.10 305.00       Clinical Maneuvering/Interventions: Therapist utilized a person-centered approach to build rapport with group member. Therapist implemented motivational interviewing techniques to assist client with exploring and resolving ambivalence associated with commitment to change behaviors related to substance use and addiction. Therapist applied cognitive behavioral strategies to facilitate identification of maladaptive patterns of thinking and behavior that contribute to client's risk for continued substance use and relapse. Therapist employed group interaction activities to build rapport among group members, promote sobriety, and emphasize relapse prevention. Therapist promoted safe nonjudgmental environment by providing group members with unconditional positive regard and encouraging group members to comply with group rules and guidelines. Therapist assisted group member with identifying and implementing healthier coping strategies.      Plan:  Continue [facility] IOP Phase I   Aftercare:  [facility] Phase II  Program Assignments:  Personal recovery plan, relapse prevention plan, attendance of recovery support group meetings, exploration of sponsorship, drug/alcohol screens.     Oscar Suazo LCSW  1/23/2023  14:42 EST

## 2023-01-25 NOTE — PROGRESS NOTES
"CD IOP GROUP     Date: 01/09/2023  Name: Pamela Pena    Time In: 1800   Time Out: 2018    Number of participants: 9    IOP GROUP NOTE     Data: 3 hour IOP group therapy session (Check-ins, Coping Skills, Relapse Prevention)     Check Ins: Therapist continued facilitation of rapport building strategies between group members. Therapist asked that each patient check in with home life and recovery efforts and identify triggers, cravings, and high risk situations that arise between group sessions. Therapist provided empathy and support during group session.     Session Content/Coping Skills: Check ins completed by group members. Clinician explored with group members recovering for yourself. Clinician discussed with group members making amends. Stages of Change Therapist Aid psychoeducational material reviewed and discussed with group members. Stages of change scenarios activity completed by group members. “The Hole” by Isabella Singleton reviewed and discussed.    Response: Patient attended class in person. Patient participated in completion of check in form. Patient on check in form answered no to question \"currently or since last group meeting have you had any suicidal thoughts or plan or intent to hurt yourself”, and patient also answered no on check in form to question of \"currently or since your last group meeting, have you had any homicidal thoughts or plan or intent to hurt others\". Clinician encouraged patient to schedule with therapy and MAT.     Personal Assessment 0-10 Scale (0-none, 10-high)    Anxiety:  4   Depression:  3   Cravings: 1     Assessment:     ..  Lab on 12/28/2022   Component Date Value Ref Range Status   • Gabapentin, Ur 12/28/2022 Negative  ug/mL Final   • THC, Screen, Urine 12/28/2022 Negative  Negative Final   • Phencyclidine (PCP), Urine 12/28/2022 Negative  Negative Final   • Cocaine Screen, Urine 12/28/2022 Negative  Negative Final   • Methamphetamine, Ur 12/28/2022 Negative  Negative Final "   • Opiate Screen 2022 Negative  Negative Final   • Amphetamine Screen, Urine 2022 Negative  Negative Final   • Benzodiazepine Screen, Urine 2022 Negative  Negative Final   • Tricyclic Antidepressants Screen 2022 Negative  Negative Final   • Methadone Screen, Urine 2022 Negative  Negative Final   • Barbiturates Screen, Urine 2022 Negative  Negative Final   • Oxycodone Screen, Urine 2022 Negative  Negative Final   • Propoxyphene Screen 2022 Negative  Negative Final   • Buprenorphine, Screen, Urine 2022 Negative  Negative Final   • Ethanol, Urine 2022 Negative  Cutoff=0.020 % Final   Office Visit on 2022   Component Date Value Ref Range Status   • Reference Lab Report 2022    Final                    Value:[facility]  [address] [place] [address]  Phone: [phone] or [phone]  Fax: [phone]  Alistair Cristina M.D., Medical Director    PATIENT NAME                                     LABORATORY NO.  [id number]   ESSIE LARKIN                                  [id number]  [id number]                                 AGE                    SEX   SSN              CLIENT REF #  [facility] BILL                        22        2000      F     [id number]      [id number]    3 [facility] 3          REQUESTING M.D.           ATTENDING M.D.         COPY [place] [address]                         DATE COLLECTED            DATE RECEIVED          DATE REPORTED  2022    ThinPrep Pap with [facility]    DIAGNOSIS:  Negative for intraepithelial lesion or malignancy    Multiple factors can influence accuracy of                           Pap tests; therefore, screening at  regular intervals is necessary for early cancer detection.      SPECIMEN ADEQUACY:  SATISFACTORY FOR  EVALUATION  Transformation zone is present.  SOURCE OF SPECIMEN:       CERVICAL  SLIDES:  1  CLINICAL HISTORY:  Encounter for gynecological examination without abnormal finding  Vaginal discharge    Chlamydia / Gonorrhea  CHLAMYDIA TRACHOMATIS: Negative  NEISSERIA GONORRHOEAE: Negative    The Aptima Combo 2 assay is a target amplification nucleic acid probe test that  utilizes target capture for the in vitro qualitative detection and differentiation of  ribosomal RNA from Chlamydia trachomatis and Neisseria gonorrhoeae to aid  in the diagnosis of chlamdial and gonococcal disease using the [place] system.    CYTOTECHNOLOGIST:             ALEX LOCO (ASCP)    CPT CODES:  41133, 11006, 72375     • Atopobium Vaginae 12/22/2022 Moderate - 1  Score Final   • BVAB 2 12/22/2022 Moderate - 1  Score Final   • Megasphaera 1 12/22/2022 Low - 0  Score Final    Comment: Calculate total score by adding the 3 individual bacterial  vaginosis (BV) marker scores together.  Total score is  interpreted as follows:  Total score 0-1: Indicates the absence of BV.  Total score   2: Indeterminate for BV. Additional clinical                   data should be evaluated to establish a                   diagnosis.  Total score 3-6: Indicates the presence of BV.  This test was developed and its performance characteristics  determined by [facility].  It has not been cleared or approved  by the Food and Drug Administration.     • Ronna Albicans, МАРИНА 12/22/2022 Negative  Negative Final   • Ronna Glabrata, МАРИНА 12/22/2022 Negative  Negative Final   • Trichomonas vaginosis 12/22/2022 Negative  Negative Final   • Chlamydia trachomatis, МАРИНА 12/22/2022 Negative  Negative Final   • Neisseria gonorrhoeae, МАРИНА 12/22/2022 Negative  Negative Final   Lab on 12/21/2022   Component Date Value Ref Range Status   • Gabapentin, Ur 12/21/2022 Negative  ug/mL Final   • THC, Screen, Urine 12/21/2022 Negative  Negative Final   • Phencyclidine (PCP), Urine 12/21/2022  Negative  Negative Final   • Cocaine Screen, Urine 12/21/2022 Negative  Negative Final   • Methamphetamine, Ur 12/21/2022 Negative  Negative Final   • Opiate Screen 12/21/2022 Negative  Negative Final   • Amphetamine Screen, Urine 12/21/2022 Negative  Negative Final   • Benzodiazepine Screen, Urine 12/21/2022 Negative  Negative Final   • Tricyclic Antidepressants Screen 12/21/2022 Negative  Negative Final   • Methadone Screen, Urine 12/21/2022 Negative  Negative Final   • Barbiturates Screen, Urine 12/21/2022 Negative  Negative Final   • Oxycodone Screen, Urine 12/21/2022 Negative  Negative Final   • Propoxyphene Screen 12/21/2022 Negative  Negative Final   • Buprenorphine, Screen, Urine 12/21/2022 Negative  Negative Final   • Ethanol, Urine 12/21/2022 Negative  Cutoff=0.020 % Final       Mental Status Exam  Hygiene:  good  Dress: casual  Attitude: cooperative and agreeable   Motor Activity: appropriate  Eye Contact:  good  Speech: regular rate and rhythm   Mood:  calm and cooperative  Affect:  Appropriate  Thought Processes:  Linear  Thought Content:  Normal  Suicidal Thoughts:  denies  Homicidal Thoughts:  denies  Crisis Safety Plan: Safety plan has been discussed.   Hallucinations:  Unknown to clinician.   Reliability: fair  Insight: fair  Judgement: fair  Impulse Control: fair    Recovery/spiritual support group attendance: No.      Progress toward goal: Not at goal    Prognosis: Fair with Ongoing Treatment     Self-reported number of days sober: Patient reported 40 days on check in form.     Patient will contact this office, call 911 or present to the nearest emergency room should suicidal or homicidal ideations occur.    Impression/Formulation:    ICD-10-CM ICD-9-CM   1. Methamphetamine use disorder, severe, dependence ([facility])  F15.20 304.40   2. Sedative, hypnotic or anxiolytic use disorder, mild, abuse ([facility])  F13.10 305.40   3. Alcohol use disorder, mild, abuse  F10.10 305.00       Clinical  Maneuvering/Interventions: Therapist utilized a person-centered approach to build rapport with group member. Therapist implemented motivational interviewing techniques to assist client with exploring and resolving ambivalence associated with commitment to change behaviors related to substance use and addiction. Therapist applied cognitive behavioral strategies to facilitate identification of maladaptive patterns of thinking and behavior that contribute to client's risk for continued substance use and relapse. Therapist employed group interaction activities to build rapport among group members, promote sobriety, and emphasize relapse prevention. Therapist promoted safe nonjudgmental environment by providing group members with unconditional positive regard and encouraging group members to comply with group rules and guidelines. Therapist assisted group member with identifying and implementing healthier coping strategies.      Plan:  Continue [facility] IOP Phase I   Aftercare:  [facility] Phase II  Program Assignments:  Personal recovery plan, relapse prevention plan, attendance of recovery support group meetings, exploration of sponsorship, drug/alcohol screens.     Oscar Suazo LCSW  1/25/2023  09:36 EST

## 2023-01-29 NOTE — PROGRESS NOTES
CD IOP GROUP     Date: 01/16/2023  Name: Pamela Pena    Time In: 1800   Time Out: 2052    This provider is located at [facility] located at [address] [place] [address].  The Patient is seen remotely at his/her home, using Zoom. Patient is being seen via telehealth and confirm that they are in a secure environment for this session. The patient's condition being diagnosed/treated is appropriate for telemedicine. The provider identified himself as well as his credentials. The patient gave consent to be seen remotely, and when consent is given they understand that the consent allows for patient identifiable information to be sent to a third party as needed. They may refuse to be seen remotely at any time. The electronic data is encrypted and password protected, and the patient has been advised of the potential risks to privacy not withstanding such measures.      Number of participants: 8    IOP GROUP NOTE     Data: 3 hour IOP group therapy session (Check-ins, Coping Skills, Relapse Prevention)     Check Ins: Therapist continued facilitation of rapport building strategies between group members. Therapist asked that each patient check in with home life and recovery efforts and identify triggers, cravings, and high risk situations that arise between group sessions. Therapist provided empathy and support during group session.     Session Content/Coping Skills: Introductions completed by group members and interview a classmate icebreaker activity completed.  shared Just for Today reading. Living in Balance- Relapse Prevention Basics reviewed. Therapist Aid Urge Surfing psychoeducational material reviewed. 50 ways to take a break (takingtheescalator) reviewed. Therapist Aid Triggers and Coping Skills psychoeducational material discussed. Group members participated in discussing group check out questions.      Response: Patient attended class via Zoom. Patient participated in  verbal completion of check in form. Patient during check in denied suicidal or homicidal thoughts.     Personal Assessment 0-10 Scale (0-none, 10-high)    Anxiety:  0   Depression:  0   Cravings: 0     Assessment:     ..  Lab on 01/12/2023   Component Date Value Ref Range Status   • Gabapentin, Ur 01/12/2023 Negative  ug/mL Final   • THC, Screen, Urine 01/12/2023 Negative  Negative Final   • Phencyclidine (PCP), Urine 01/12/2023 Negative  Negative Final   • Cocaine Screen, Urine 01/12/2023 Negative  Negative Final   • Methamphetamine, Ur 01/12/2023 Negative  Negative Final   • Opiate Screen 01/12/2023 Negative  Negative Final   • Amphetamine Screen, Urine 01/12/2023 Negative  Negative Final   • Benzodiazepine Screen, Urine 01/12/2023 Negative  Negative Final   • Tricyclic Antidepressants Screen 01/12/2023 Negative  Negative Final   • Methadone Screen, Urine 01/12/2023 Negative  Negative Final   • Barbiturates Screen, Urine 01/12/2023 Negative  Negative Final   • Oxycodone Screen, Urine 01/12/2023 Negative  Negative Final   • Propoxyphene Screen 01/12/2023 Negative  Negative Final   • Buprenorphine, Screen, Urine 01/12/2023 Negative  Negative Final   • Ethanol, Urine 01/12/2023 Negative  Cutoff=0.020 % Final   Lab on 12/28/2022   Component Date Value Ref Range Status   • Gabapentin, Ur 12/28/2022 Negative  ug/mL Final   • THC, Screen, Urine 12/28/2022 Negative  Negative Final   • Phencyclidine (PCP), Urine 12/28/2022 Negative  Negative Final   • Cocaine Screen, Urine 12/28/2022 Negative  Negative Final   • Methamphetamine, Ur 12/28/2022 Negative  Negative Final   • Opiate Screen 12/28/2022 Negative  Negative Final   • Amphetamine Screen, Urine 12/28/2022 Negative  Negative Final   • Benzodiazepine Screen, Urine 12/28/2022 Negative  Negative Final   • Tricyclic Antidepressants Screen 12/28/2022 Negative  Negative Final   • Methadone Screen, Urine 12/28/2022 Negative  Negative Final   • Barbiturates Screen, Urine  12/28/2022 Negative  Negative Final   • Oxycodone Screen, Urine 12/28/2022 Negative  Negative Final   • Propoxyphene Screen 12/28/2022 Negative  Negative Final   • Buprenorphine, Screen, Urine 12/28/2022 Negative  Negative Final   • Ethanol, Urine 12/28/2022 Negative  Cutoff=0.020 % Final       Mental Status Exam  Hygiene:  good  Dress: casual  Attitude: cooperative and agreeable   Motor Activity: appropriate  Eye Contact:  good  Speech: regular rate and rhythm   Mood:  calm and cooperative  Affect:  Appropriate  Thought Processes:  Linear  Thought Content:  Normal  Suicidal Thoughts:  denies  Homicidal Thoughts:  denies  Crisis Safety Plan: Safety plan has been discussed.   Hallucinations:  Unknown to clinician.   Reliability: fair  Insight: fair  Judgement: fair  Impulse Control: fair    Recovery/spiritual support group attendance: No.      Progress toward goal: Not at goal    Prognosis: Fair with Ongoing Treatment     Self-reported number of days sober: Patient reported 38 days during check in.     Patient will contact this office, call 911 or present to the nearest emergency room should suicidal or homicidal ideations occur.    Impression/Formulation:    ICD-10-CM ICD-9-CM   1. Methamphetamine use disorder, severe, dependence ([facility])  F15.20 304.40   2. Sedative, hypnotic or anxiolytic use disorder, mild, abuse ([facility])  F13.10 305.40   3. Alcohol use disorder, mild, abuse  F10.10 305.00       Clinical Maneuvering/Interventions: Therapist utilized a person-centered approach to build rapport with group member. Therapist implemented motivational interviewing techniques to assist client with exploring and resolving ambivalence associated with commitment to change behaviors related to substance use and addiction. Therapist applied cognitive behavioral strategies to facilitate identification of maladaptive patterns of thinking and behavior that contribute to client's risk for continued substance use and relapse. Therapist  employed group interaction activities to build rapport among group members, promote sobriety, and emphasize relapse prevention. Therapist promoted safe nonjudgmental environment by providing group members with unconditional positive regard and encouraging group members to comply with group rules and guidelines. Therapist assisted group member with identifying and implementing healthier coping strategies.      Plan:  Continue [facility] IOP Phase I   Aftercare:  [facility] Phase II  Program Assignments:  Personal recovery plan, relapse prevention plan, attendance of recovery support group meetings, exploration of sponsorship, drug/alcohol screens.     Oscar Suazo LCSW  1/29/2023  08:21 EST

## 2023-01-30 ENCOUNTER — DOCUMENTATION (OUTPATIENT)
Dept: PSYCHIATRY | Facility: HOSPITAL | Age: 23
End: 2023-01-30
Payer: COMMERCIAL

## 2023-01-30 NOTE — PROGRESS NOTES
"CD IOP GROUP     Date: 01/18/2023  Name: Pamela Pena    Time In: 1800   Time Out: 2035    Number of participants: 8    IOP GROUP NOTE     Data: 3 hour IOP group therapy session (Check-ins, Coping Skills, Relapse Prevention)     Check Ins: Therapist continued facilitation of rapport building strategies between group members. Therapist asked that each patient check in with home life and recovery efforts and identify triggers, cravings, and high risk situations that arise between group sessions. Therapist provided empathy and support during group session.     Session Content/Coping Skills: Celebrate recovery resource discussed. Two truths and a lie icebreaker activity completed by group members. Check ins completed by group members. Therapist aid coping skills addiction, gratitude exercises, what is mindfulness, and grounding technique psychoeducational material reviewed and discussed. Replacing negative thoughts with positive thoughts psychoeducational material reviewed and discussed ([email or web address]). Coping strategies scenario activity completed and discussed. Clinician discussed with group members coping skill log. Coping skills bingo group activity completed.       Response: Patient attended class in person. Patient participated in completion of check in form. Patient on check in form answered no to question \"currently or since last group meeting have you had any suicidal thoughts or plan or intent to hurt yourself”, and patient also answered no on check in form to question of \"currently or since your last group meeting, have you had any homicidal thoughts or plan or intent to hurt others\".     Personal Assessment 0-10 Scale (0-none, 10-high)    Anxiety:  0   Depression:  0   Cravings: 0     Assessment:     ..  Lab on 01/18/2023   Component Date Value Ref Range Status   • Gabapentin, Ur 01/18/2023 Negative  ug/mL Final   • THC, Screen, Urine 01/18/2023 Negative  Negative Final   • Phencyclidine (PCP), " Urine 01/18/2023 Negative  Negative Final   • Cocaine Screen, Urine 01/18/2023 Negative  Negative Final   • Methamphetamine, Ur 01/18/2023 Negative  Negative Final   • Opiate Screen 01/18/2023 Negative  Negative Final   • Amphetamine Screen, Urine 01/18/2023 Negative  Negative Final   • Benzodiazepine Screen, Urine 01/18/2023 Negative  Negative Final   • Tricyclic Antidepressants Screen 01/18/2023 Negative  Negative Final   • Methadone Screen, Urine 01/18/2023 Negative  Negative Final   • Barbiturates Screen, Urine 01/18/2023 Negative  Negative Final   • Oxycodone Screen, Urine 01/18/2023 Negative  Negative Final   • Propoxyphene Screen 01/18/2023 Negative  Negative Final   • Buprenorphine, Screen, Urine 01/18/2023 Negative  Negative Final   • Ethanol, Urine 01/18/2023 Negative  Cutoff=0.020 % Final   Lab on 01/12/2023   Component Date Value Ref Range Status   • Gabapentin, Ur 01/12/2023 Negative  ug/mL Final   • THC, Screen, Urine 01/12/2023 Negative  Negative Final   • Phencyclidine (PCP), Urine 01/12/2023 Negative  Negative Final   • Cocaine Screen, Urine 01/12/2023 Negative  Negative Final   • Methamphetamine, Ur 01/12/2023 Negative  Negative Final   • Opiate Screen 01/12/2023 Negative  Negative Final   • Amphetamine Screen, Urine 01/12/2023 Negative  Negative Final   • Benzodiazepine Screen, Urine 01/12/2023 Negative  Negative Final   • Tricyclic Antidepressants Screen 01/12/2023 Negative  Negative Final   • Methadone Screen, Urine 01/12/2023 Negative  Negative Final   • Barbiturates Screen, Urine 01/12/2023 Negative  Negative Final   • Oxycodone Screen, Urine 01/12/2023 Negative  Negative Final   • Propoxyphene Screen 01/12/2023 Negative  Negative Final   • Buprenorphine, Screen, Urine 01/12/2023 Negative  Negative Final   • Ethanol, Urine 01/12/2023 Negative  Cutoff=0.020 % Final       Mental Status Exam  Hygiene:  good  Dress: casual  Attitude: cooperative and agreeable   Motor Activity: appropriate  Eye  Contact:  good  Speech: regular rate and rhythm   Mood:  calm and cooperative  Affect:  Appropriate  Thought Processes:  Linear  Thought Content:  Normal  Suicidal Thoughts:  denies  Homicidal Thoughts:  denies  Crisis Safety Plan: Safety plan has been discussed.   Hallucinations:  Unknown to clinician.   Reliability: fair  Insight: fair  Judgement: fair  Impulse Control: fair    Recovery/spiritual support group attendance: No.      Progress toward goal: Not at goal    Prognosis: Fair with Ongoing Treatment     Self-reported number of days sober: 40 days.     Patient will contact this office, call 911 or present to the nearest emergency room should suicidal or homicidal ideations occur.    Impression/Formulation:    ICD-10-CM ICD-9-CM   1. Methamphetamine use disorder, severe, dependence ([facility])  F15.20 304.40   2. Sedative, hypnotic or anxiolytic use disorder, mild, abuse ([facility])  F13.10 305.40   3. Alcohol use disorder, mild, abuse  F10.10 305.00       Clinical Maneuvering/Interventions: Therapist utilized a person-centered approach to build rapport with group member. Therapist implemented motivational interviewing techniques to assist client with exploring and resolving ambivalence associated with commitment to change behaviors related to substance use and addiction. Therapist applied cognitive behavioral strategies to facilitate identification of maladaptive patterns of thinking and behavior that contribute to client's risk for continued substance use and relapse. Therapist employed group interaction activities to build rapport among group members, promote sobriety, and emphasize relapse prevention. Therapist promoted safe nonjudgmental environment by providing group members with unconditional positive regard and encouraging group members to comply with group rules and guidelines. Therapist assisted group member with identifying and implementing healthier coping strategies.      Plan:  Continue [facility] Phase I   Aftercare:  [facility] Phase II  Program Assignments:  Personal recovery plan, relapse prevention plan, attendance of recovery support group meetings, exploration of sponsorship, drug/alcohol screens.     Oscar Suazo LCSW  1/30/2023  09:35 EST

## 2023-01-30 NOTE — PROGRESS NOTES
Patient missed CD-IOP on 1/25/2023 and 1/26/2023. Clinician this date called patient ([phone]) at 03:17 PM to check in, no answer, clinician left voicemail to call back.     -Oscar Suazo, LINA

## 2023-02-02 ENCOUNTER — DOCUMENTATION (OUTPATIENT)
Dept: PSYCHIATRY | Facility: HOSPITAL | Age: 23
End: 2023-02-02
Payer: COMMERCIAL

## 2023-02-02 NOTE — PROGRESS NOTES
"CD IOP GROUP     Date: 01/19/2023  Name: Pamela Pena    Time In: 1800 (Note: Patient arrived at approximately 06:18 PM).   Time Out: 2050    Number of participants: 9.    IOP GROUP NOTE     Data: 3 hour IOP group therapy session (Check-ins, Coping Skills, Relapse Prevention)     Check Ins: Therapist continued facilitation of rapport building strategies between group members. Therapist asked that each patient check in with home life and recovery efforts and identify triggers, cravings, and high risk situations that arise between group sessions. Therapist provided empathy and support during group session.     Session Content/Coping Skills: Clinician provided group members with sober living [facility] resource sheet. NA meeting resource provided to group members. Check ins completed by group members. Just for Today reading reviewed and discussed. Dimitri Facts about Addiction psychoeducational material reviewed. Group members explored and discussed their substance use history. Group members were asked to consider and discuss arguments regarding if addiction is a disease or a choice. Neurobiology of Addiction 101 in [employer] video viewed and discussed.     Response: Patient attended class in person. Patient participated in completion of check in form. Patient on check in form answered no to question \"currently or since last group meeting have you had any suicidal thoughts or plan or intent to hurt yourself”, and patient also answered no on check in form to question of \"currently or since your last group meeting, have you had any homicidal thoughts or plan or intent to hurt others\". Patient on check in form reported concerns with sleep since last group meeting.     Personal Assessment 0-10 Scale (0-none, 10-high)    Anxiety:  3   Depression:  0   Cravings: 0     Assessment:     ..  Lab on 01/18/2023   Component Date Value Ref Range Status   • Gabapentin, Ur 01/18/2023 Negative  ug/mL Final   • THC, Screen, Urine " 01/18/2023 Negative  Negative Final   • Phencyclidine (PCP), Urine 01/18/2023 Negative  Negative Final   • Cocaine Screen, Urine 01/18/2023 Negative  Negative Final   • Methamphetamine, Ur 01/18/2023 Negative  Negative Final   • Opiate Screen 01/18/2023 Negative  Negative Final   • Amphetamine Screen, Urine 01/18/2023 Negative  Negative Final   • Benzodiazepine Screen, Urine 01/18/2023 Negative  Negative Final   • Tricyclic Antidepressants Screen 01/18/2023 Negative  Negative Final   • Methadone Screen, Urine 01/18/2023 Negative  Negative Final   • Barbiturates Screen, Urine 01/18/2023 Negative  Negative Final   • Oxycodone Screen, Urine 01/18/2023 Negative  Negative Final   • Propoxyphene Screen 01/18/2023 Negative  Negative Final   • Buprenorphine, Screen, Urine 01/18/2023 Negative  Negative Final   • Ethanol, Urine 01/18/2023 Negative  Cutoff=0.020 % Final   Lab on 01/12/2023   Component Date Value Ref Range Status   • Gabapentin, Ur 01/12/2023 Negative  ug/mL Final   • THC, Screen, Urine 01/12/2023 Negative  Negative Final   • Phencyclidine (PCP), Urine 01/12/2023 Negative  Negative Final   • Cocaine Screen, Urine 01/12/2023 Negative  Negative Final   • Methamphetamine, Ur 01/12/2023 Negative  Negative Final   • Opiate Screen 01/12/2023 Negative  Negative Final   • Amphetamine Screen, Urine 01/12/2023 Negative  Negative Final   • Benzodiazepine Screen, Urine 01/12/2023 Negative  Negative Final   • Tricyclic Antidepressants Screen 01/12/2023 Negative  Negative Final   • Methadone Screen, Urine 01/12/2023 Negative  Negative Final   • Barbiturates Screen, Urine 01/12/2023 Negative  Negative Final   • Oxycodone Screen, Urine 01/12/2023 Negative  Negative Final   • Propoxyphene Screen 01/12/2023 Negative  Negative Final   • Buprenorphine, Screen, Urine 01/12/2023 Negative  Negative Final   • Ethanol, Urine 01/12/2023 Negative  Cutoff=0.020 % Final       Mental Status Exam  Hygiene:  good  Dress: casual  Attitude:  cooperative and agreeable   Motor Activity: appropriate  Eye Contact:  good  Speech: regular rate and rhythm   Mood:  calm and cooperative  Affect:  Appropriate  Thought Processes:  Linear  Thought Content:  Normal  Suicidal Thoughts:  denies  Homicidal Thoughts:  denies  Crisis Safety Plan: Safety plan has been discussed.   Hallucinations:  Unknown to clinician.   Reliability: fair  Insight: fair  Judgement: fair  Impulse Control: fair    Recovery/spiritual support group attendance: No.      Progress toward goal: Not at goal    Prognosis: Fair with Ongoing Treatment     Self-reported number of days sober: Patient reported 41 days on check in form.     Patient will contact this office, call 911 or present to the nearest emergency room should suicidal or homicidal ideations occur.    Impression/Formulation:    ICD-10-CM ICD-9-CM   1. Methamphetamine use disorder, severe, dependence (HCC)  F15.20 304.40   2. Sedative, hypnotic or anxiolytic use disorder, mild, abuse (HCC)  F13.10 305.40   3. Alcohol use disorder, mild, abuse  F10.10 305.00       Clinical Maneuvering/Interventions: Therapist utilized a person-centered approach to build rapport with group member. Therapist implemented motivational interviewing techniques to assist client with exploring and resolving ambivalence associated with commitment to change behaviors related to substance use and addiction. Therapist applied cognitive behavioral strategies to facilitate identification of maladaptive patterns of thinking and behavior that contribute to client's risk for continued substance use and relapse. Therapist employed group interaction activities to build rapport among group members, promote sobriety, and emphasize relapse prevention. Therapist promoted safe nonjudgmental environment by providing group members with unconditional positive regard and encouraging group members to comply with group rules and guidelines. Therapist assisted group member with  identifying and implementing healthier coping strategies.      Plan:  Continue [facility] IOP Phase I   Aftercare:  [facility] Phase II  Program Assignments:  Personal recovery plan, relapse prevention plan, attendance of recovery support group meetings, exploration of sponsorship, drug/alcohol screens.     Oscar Suazo LCSW  2/2/2023  14:55 EST

## 2023-02-02 NOTE — PROGRESS NOTES
Clinician this date at 11:43 AM called patient.  was present for phone call. Patient stated she is doing good but working a lot and falls asleep after work. Patient reports now working at [employer]. Patient has missed several CD-IOP classes recently. Patient reports meds helping. Patient denied SI or HI and patient denied substance use. Patient reports she will be on Zoom tonight. Clinician explained to patient the importance of communication with therapist or peer support. Clinician also explained that there can be no more CD-IOP absences.     -Oscar Suazo LCSW

## 2023-02-02 NOTE — PROGRESS NOTES
CD IOP GROUP     Date: 01/23/2023  Name: Pamela Pena    Time In: 1800   Time Out: 2055    This provider is located at [facility] located at [address] [place] [address].  The Patient is seen remotely at his/her home, using Zoom. Patient is being seen via telehealth and confirm that they are in a secure environment for this session. The patient's condition being diagnosed/treated is appropriate for telemedicine. The provider identified himself as well as his credentials. The patient gave consent to be seen remotely, and when consent is given they understand that the consent allows for patient identifiable information to be sent to a third party as needed. They may refuse to be seen remotely at any time. The electronic data is encrypted and password protected, and the patient has been advised of the potential risks to privacy not withstanding such measures.      Number of participants: 7.    IOP GROUP NOTE     Data: 3 hour IOP group therapy session (Check-ins, Coping Skills, Relapse Prevention)     Check Ins: Therapist continued facilitation of rapport building strategies between group members. Therapist asked that each patient check in with home life and recovery efforts and identify triggers, cravings, and high risk situations that arise between group sessions. Therapist provided empathy and support during group session.     Session Content/Coping Skills: Peer Support and  joined for first part of meeting. Check ins completed by group members. Fun Facts group activity completed. “Three Good People” strengths spotting exercise activity completed (Therapist Aid). Clinician discussed with group members protective factors and risk factors. Homework of identifying protective factors and risk factors assigned.     Response: Patient attended class via Zoom. Patient participated in verbal completion of check in form. Patient during check in denied suicidal or homicidal thoughts.      Personal Assessment 0-10 Scale (0-none, 10-high)    Anxiety:  6   Depression:  3   Cravings: 2     Assessment:     ..  Lab on 01/18/2023   Component Date Value Ref Range Status   • Gabapentin, Ur 01/18/2023 Negative  ug/mL Final   • THC, Screen, Urine 01/18/2023 Negative  Negative Final   • Phencyclidine (PCP), Urine 01/18/2023 Negative  Negative Final   • Cocaine Screen, Urine 01/18/2023 Negative  Negative Final   • Methamphetamine, Ur 01/18/2023 Negative  Negative Final   • Opiate Screen 01/18/2023 Negative  Negative Final   • Amphetamine Screen, Urine 01/18/2023 Negative  Negative Final   • Benzodiazepine Screen, Urine 01/18/2023 Negative  Negative Final   • Tricyclic Antidepressants Screen 01/18/2023 Negative  Negative Final   • Methadone Screen, Urine 01/18/2023 Negative  Negative Final   • Barbiturates Screen, Urine 01/18/2023 Negative  Negative Final   • Oxycodone Screen, Urine 01/18/2023 Negative  Negative Final   • Propoxyphene Screen 01/18/2023 Negative  Negative Final   • Buprenorphine, Screen, Urine 01/18/2023 Negative  Negative Final   • Ethanol, Urine 01/18/2023 Negative  Cutoff=0.020 % Final   Lab on 01/12/2023   Component Date Value Ref Range Status   • Gabapentin, Ur 01/12/2023 Negative  ug/mL Final   • THC, Screen, Urine 01/12/2023 Negative  Negative Final   • Phencyclidine (PCP), Urine 01/12/2023 Negative  Negative Final   • Cocaine Screen, Urine 01/12/2023 Negative  Negative Final   • Methamphetamine, Ur 01/12/2023 Negative  Negative Final   • Opiate Screen 01/12/2023 Negative  Negative Final   • Amphetamine Screen, Urine 01/12/2023 Negative  Negative Final   • Benzodiazepine Screen, Urine 01/12/2023 Negative  Negative Final   • Tricyclic Antidepressants Screen 01/12/2023 Negative  Negative Final   • Methadone Screen, Urine 01/12/2023 Negative  Negative Final   • Barbiturates Screen, Urine 01/12/2023 Negative  Negative Final   • Oxycodone Screen, Urine 01/12/2023 Negative  Negative Final   •  Propoxyphene Screen 01/12/2023 Negative  Negative Final   • Buprenorphine, Screen, Urine 01/12/2023 Negative  Negative Final   • Ethanol, Urine 01/12/2023 Negative  Cutoff=0.020 % Final       Mental Status Exam  Hygiene:  good  Dress: casual  Attitude: cooperative and agreeable   Motor Activity: appropriate  Eye Contact:  good  Speech: regular rate and rhythm   Mood:  calm and cooperative  Affect:  Appropriate  Thought Processes:  Linear  Thought Content:  Normal  Suicidal Thoughts:  denies  Homicidal Thoughts:  denies  Crisis Safety Plan: Safety plan has been discussed.   Hallucinations:  Unknown to clinician.   Reliability: fair  Insight: fair  Judgement: fair  Impulse Control: fair    Recovery/spiritual support group attendance: No.      Progress toward goal: Not at goal    Prognosis: Fair with Ongoing Treatment     Self-reported number of days sober: Patient reported 41 days during check in.     Patient will contact this office, call 911 or present to the nearest emergency room should suicidal or homicidal ideations occur.    Impression/Formulation:    ICD-10-CM ICD-9-CM   1. Methamphetamine use disorder, severe, dependence ([facility])  F15.20 304.40   2. Sedative, hypnotic or anxiolytic use disorder, mild, abuse (HCC)  F13.10 305.40   3. Alcohol use disorder, mild, abuse  F10.10 305.00       Clinical Maneuvering/Interventions: Therapist utilized a person-centered approach to build rapport with group member. Therapist implemented motivational interviewing techniques to assist client with exploring and resolving ambivalence associated with commitment to change behaviors related to substance use and addiction. Therapist applied cognitive behavioral strategies to facilitate identification of maladaptive patterns of thinking and behavior that contribute to client's risk for continued substance use and relapse. Therapist employed group interaction activities to build rapport among group members, promote sobriety, and  emphasize relapse prevention. Therapist promoted safe nonjudgmental environment by providing group members with unconditional positive regard and encouraging group members to comply with group rules and guidelines. Therapist assisted group member with identifying and implementing healthier coping strategies.      Plan:  Continue [facility] IOP Phase I   Aftercare:  [facility] Phase II  Program Assignments:  Personal recovery plan, relapse prevention plan, attendance of recovery support group meetings, exploration of sponsorship, drug/alcohol screens.     Oscar Suazo LCSW  2/2/2023  15:42 EST

## 2023-02-09 ENCOUNTER — DOCUMENTATION (OUTPATIENT)
Dept: PSYCHIATRY | Facility: HOSPITAL | Age: 23
End: 2023-02-09
Payer: COMMERCIAL

## 2023-02-09 NOTE — PROGRESS NOTES
Clinician had received message that patient had called to speak with clinician on 2/8/2023. Clinician this date (2/9/2023) called patient at 02:41 PM. Clinician explained that patient would be discharged from CD-IOP but if she wanted to start back, we could do a new intake, but she would have to start from beginning. Patient was agreeable to this and clinician requested that patient be rescheduled for CD-IOP intake on 2/13/2023. Patient denied suicidal or homicidal thoughts. Clinician also discussed appointment that patient had that was missed today with LINDA Calderón. Patient stated she had overslept but will reschedule. Clinician has reached out to office staff for rescheduling. Patient did report marijuana use and reported having a “couple of shots” and discussed feeling loss and reported that things have “been a mess”. Clinician asked patient to communicate with us and let us know how we can help.     -Oscar Suazo, LINA  2/9/2023

## 2023-02-13 ENCOUNTER — DOCUMENTATION (OUTPATIENT)
Dept: PSYCHIATRY | Facility: HOSPITAL | Age: 23
End: 2023-02-13
Payer: COMMERCIAL

## 2023-02-13 NOTE — PROGRESS NOTES
[facility]  [address], [address]  [phone]    CHEMICAL DEPENDENCY   INTENSIVE OUTPATIENT PROGRAM    PHASE I  DISCHARGE SUMMARY    ATTENDING: LINDA Singer  THERAPIST: Oscar Suazo LCSW    DATE OF ADMISSION: 11/21/2022 (Date of first class attended).   DATE OF DISCHARGE: 2/9/2022    REASON FOR ADMISSION: Pamela Pena was REFERRED BY [facility] and admitted to [facility] for methamphetamine use disorder, severe, dependence; Sedative, hypnotic, or anxiolytic use disorder, mild, abuse; and Alcohol use disorder, mild, abuse.     SUMMARY OF CARE, TREATMENT, and SERVICES PROVIDED: Pamela Pena was assessed and determined to meet criteria for [facility] level of care on 11/3/2022. Pt began IOP Phase I on 11/21/2022 and attended 17 group therapy sessions.  Pt had 18 absences. Patient displayed negative results on each UDS and ethanol screen completed while in CD-IOP phase one. Patient will be discharged from CD-[facility] due to attendance.     DISCHARGE RECOMMENDATIONS and REFERRALS: Pamela Pena did not complete Phase I of the [facility] Chemical Dependency IOP and was discharged on 2/9/2022 due to attendance. Clinician spoke with patient on 2/9/2023 and patient was agreeable to restarting CD-IOP phase one. Patient was scheduled for -[facility] intake assessment for 2/13/2023.     Current Outpatient Medications:   •  ARIPiprazole (ABILIFY) 5 MG tablet, Take 1 tablet by mouth Daily., Disp: 30 tablet, Rfl: 0    PROGNOSIS: poor without continued care    Oscar Suazo LCSW.  2/13/2023.

## 2023-08-14 ENCOUNTER — OFFICE VISIT (OUTPATIENT)
Dept: OBSTETRICS AND GYNECOLOGY | Facility: CLINIC | Age: 23
End: 2023-08-14
Payer: COMMERCIAL

## 2023-08-14 VITALS — BODY MASS INDEX: 30.92 KG/M2 | DIASTOLIC BLOOD PRESSURE: 60 MMHG | SYSTOLIC BLOOD PRESSURE: 100 MMHG | WEIGHT: 185.8 LBS

## 2023-08-14 DIAGNOSIS — N76.1 CHRONIC VAGINITIS: Primary | ICD-10-CM

## 2023-08-14 RX ORDER — METRONIDAZOLE 7.5 MG/G
GEL VAGINAL 2 TIMES WEEKLY
Qty: 1 EACH | Refills: 5 | Status: SHIPPED | OUTPATIENT
Start: 2023-08-14

## 2023-08-14 NOTE — PROGRESS NOTES
Subjective   Chief Complaint   Patient presents with    Vaginal Discharge     Patient complains of vaginal discharge with odor.     Pamela Pena is a 23 y.o. year old .  Patient's last menstrual period was 2023 (approximate).  She presents to be seen because of recurrent BV.  When patient takes Flagyl does improve symptoms but they recur very quickly.  She never did the MetroGel suppression that we discussed this past winter.    OTHER COMPLAINTS:  Nothing else    The following portions of the patient's history were reviewed and updated as appropriate:She  has a past medical history of ADHD (attention deficit hyperactivity disorder) (N/a), Alcohol abuse, Alcoholism, Anxiety, Bacterial vaginosis, Bipolar disorder, Chlamydia, Depression, Head injury, Migraine, Panic disorder, Psychiatric illness, PTSD (post-traumatic stress disorder), Self-injurious behavior, Substance abuse, Suicide attempt, Violence, history of, and Withdrawal symptoms, drug or narcotic.  She does not have any pertinent problems on file.  She  has no past surgical history on file.  Her family history includes Alcohol abuse in her father and mother; Bipolar disorder in her mother; Depression in her mother; Diabetes in her mother; Drug abuse in her mother; Paranoid behavior in her mother; Schizophrenia in her mother; Self-Injurious Behavior  in her mother.  She  reports that she has never smoked. She has never used smokeless tobacco. She reports that she does not currently use alcohol. She reports current drug use. Frequency: 2.00 times per week. Drugs: Amphetamines, Marijuana, and Methamphetamines.  Current Outpatient Medications   Medication Sig Dispense Refill    ARIPiprazole (ABILIFY) 5 MG tablet Take 1 tablet by mouth Daily. 30 tablet 0    metroNIDAZOLE (METROGEL VAGINAL) 0.75 % vaginal gel Insert  into the vagina 2 (Two) Times a Week. Twice weekly at bedtime x 12 weeks 1 each 5     No current facility-administered medications for  this visit.     Current Outpatient Medications on File Prior to Visit   Medication Sig    ARIPiprazole (ABILIFY) 5 MG tablet Take 1 tablet by mouth Daily.     No current facility-administered medications on file prior to visit.     She is allergic to vraylar [cariprazine hcl].    Social History    Tobacco Use      Smoking status: Never      Smokeless tobacco: Never    Review of Systems  Consitutional POS: nothing reported    NEG: anorexia or night sweats   Gastointestinal POS: nothing reported    NEG: bloating, change in bowel habits, melena, or reflux symptoms   Genitourinary POS: nothing reported    NEG: dysuria or hematuria   Integument POS: nothing reported    NEG: moles that are changing in size, shape, color or rashes   Breast POS: nothing reported    NEG: persistent breast lump, skin dimpling, or nipple discharge         Pertinent items are noted in HPI.          Objective   /60   Wt 84.3 kg (185 lb 12.8 oz)   LMP 07/31/2023 (Approximate)   BMI 30.92 kg/mý     General:  well developed; well nourished  no acute distress   Skin:  No suspicious lesions seen   Thyroid: not examined   Lungs:  breathing is unlabored   Heart:  Not performed.   Breasts:  Not performed.   Abdomen: soft, non-tender; no masses  no umbilical or inguinal hernias are present  no hepato-splenomegaly   Pelvis: Clinical staff was present for exam  External genitalia:  normal appearance of the external genitalia including Bartholin's and [place]'s glands.  :  urethral meatus normal;  Vaginal:  normal pink mucosa without prolapse or lesions. discharge present -  mucousy and white;  Cervix:  normal appearance.  Uterus:  normal size, shape and consistency.  Adnexa:  normal bimanual exam of the adnexa.  Rectal:  digital rectal exam not performed; anus visually normal appearing.     Psychiatric: Alert and oriented x3, mood and affect appropriate  HEENT: Atraumatic, normocephalic, normal scleral icterus  Extremities: 2+ pulses bilaterally,  no edema      Lab Review   STD swabs for GC, chlamydia, and trichimoniasis    Imaging   No data reviewed        Assessment   Recurrent vaginitis     Plan   Cultures taken.  She does have a white frothy discharge.  MetroGel suppression twice a week at night discussed and sent in.  We are in a wait on the culture results though to see if we need to induce with oral Flagyl or another medicine.  We will call with results.      No orders of the defined types were placed in this encounter.         This note was electronically signed.      August 14, 2023

## 2023-08-16 LAB
A VAGINAE DNA VAG QL NAA+PROBE: ABNORMAL SCORE
BVAB2 DNA VAG QL NAA+PROBE: ABNORMAL SCORE
C ALBICANS DNA VAG QL NAA+PROBE: NEGATIVE
C GLABRATA DNA VAG QL NAA+PROBE: NEGATIVE
C TRACH DNA VAG QL NAA+PROBE: NEGATIVE
MEGA1 DNA VAG QL NAA+PROBE: ABNORMAL SCORE
N GONORRHOEA DNA VAG QL NAA+PROBE: NEGATIVE
T VAGINALIS DNA VAG QL NAA+PROBE: NEGATIVE

## 2023-08-17 ENCOUNTER — TELEPHONE (OUTPATIENT)
Dept: OBSTETRICS AND GYNECOLOGY | Facility: CLINIC | Age: 23
End: 2023-08-17
Payer: COMMERCIAL

## 2023-08-20 RX ORDER — METRONIDAZOLE 500 MG/1
500 TABLET ORAL 2 TIMES DAILY
Qty: 14 TABLET | Refills: 0 | Status: SHIPPED | OUTPATIENT
Start: 2023-08-20 | End: 2023-08-27

## 2023-08-20 NOTE — PROGRESS NOTES
Flagyl twice a day sent in for 7 days-- do this first and then the following week start the vaginal metrogel  twice weekly for 12 weeks for suppression. Thanks

## 2023-08-26 ENCOUNTER — HOSPITAL ENCOUNTER (EMERGENCY)
Facility: HOSPITAL | Age: 23
Discharge: HOME | End: 2023-08-26
Attending: EMERGENCY MEDICINE
Payer: COMMERCIAL

## 2023-08-26 VITALS
SYSTOLIC BLOOD PRESSURE: 106 MMHG | HEIGHT: 66 IN | WEIGHT: 167.2 LBS | DIASTOLIC BLOOD PRESSURE: 78 MMHG | BODY MASS INDEX: 26.87 KG/M2 | HEART RATE: 62 BPM | OXYGEN SATURATION: 98 % | TEMPERATURE: 97.8 F | RESPIRATION RATE: 18 BRPM

## 2023-08-26 DIAGNOSIS — U07.1 COVID-19: Primary | ICD-10-CM

## 2023-08-26 DIAGNOSIS — H72.92 PERFORATION OF LEFT TYMPANIC MEMBRANE: ICD-10-CM

## 2023-08-26 LAB — SARS-COV-2 RNA RESP QL NAA+PROBE: DETECTED

## 2023-08-26 PROCEDURE — 99282 EMERGENCY DEPT VISIT SF MDM: CPT

## 2023-08-26 PROCEDURE — 87635 SARS-COV-2 COVID-19 AMP PRB: CPT | Performed by: PHYSICIAN ASSISTANT
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
## 2024-11-14 ENCOUNTER — HOSPITAL ENCOUNTER (EMERGENCY)
Facility: HOSPITAL | Age: 24
Discharge: HOME | End: 2024-11-14
Attending: STUDENT IN AN ORGANIZED HEALTH CARE EDUCATION/TRAINING PROGRAM
Payer: COMMERCIAL

## 2024-11-14 VITALS
TEMPERATURE: 98 F | HEART RATE: 81 BPM | OXYGEN SATURATION: 99 % | WEIGHT: 175 LBS | RESPIRATION RATE: 18 BRPM | SYSTOLIC BLOOD PRESSURE: 147 MMHG | HEIGHT: 66 IN | DIASTOLIC BLOOD PRESSURE: 108 MMHG | BODY MASS INDEX: 28.12 KG/M2

## 2024-11-14 DIAGNOSIS — H66.91 RIGHT OTITIS MEDIA, UNSPECIFIED OTITIS MEDIA TYPE: Primary | ICD-10-CM

## 2024-11-14 DIAGNOSIS — H92.21 OTORRHAGIA OF RIGHT EAR: ICD-10-CM

## 2024-11-14 DIAGNOSIS — H72.91 RUPTURED TYMPANIC MEMBRANE, RIGHT: ICD-10-CM

## 2024-11-14 PROCEDURE — 99283 EMERGENCY DEPT VISIT LOW MDM: CPT | Performed by: STUDENT IN AN ORGANIZED HEALTH CARE EDUCATION/TRAINING PROGRAM

## 2024-11-14 RX ORDER — OFLOXACIN 3 MG/ML
5 SOLUTION AURICULAR (OTIC) DAILY
Qty: 2 ML | Refills: 0 | Status: SHIPPED | OUTPATIENT
Start: 2024-11-14 | End: 2024-11-14

## 2024-11-14 RX ORDER — OFLOXACIN 3 MG/ML
5 SOLUTION AURICULAR (OTIC) DAILY
Qty: 10 ML | Refills: 0 | Status: SHIPPED | OUTPATIENT
Start: 2024-11-14 | End: 2024-12-24

## 2024-11-14 RX ORDER — OFLOXACIN 3 MG/ML
10 SOLUTION/ DROPS OPHTHALMIC DAILY
Status: COMPLETED | OUTPATIENT
Start: 2024-11-14 | End: 2024-11-14

## 2024-11-14 RX ADMIN — AMOXICILLIN AND CLAVULANATE POTASSIUM 1 TABLET: 875; 125 TABLET, FILM COATED ORAL at 08:17

## 2024-11-14 RX ADMIN — OFLOXACIN 10 DROP: 3 SOLUTION OPHTHALMIC at 08:17
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
## 2024-11-14 NOTE — DISCHARGE INSTRUCTIONS
Take antibiotic and use drops as prescribed.  Follow-up with ENT specialist for further outpatient evaluation and definitive care of ear pain and bleeding.  Follow-up with your PCP as needed.  Take Tylenol and Motrin as needed per directions on the package.  Return to the ER for new or worsening symptoms or acute concerns.

## 2024-12-13 ENCOUNTER — HOSPITAL ENCOUNTER (EMERGENCY)
Facility: HOSPITAL | Age: 24
Discharge: HOME | End: 2024-12-13
Attending: STUDENT IN AN ORGANIZED HEALTH CARE EDUCATION/TRAINING PROGRAM
Payer: MEDICAID

## 2024-12-13 ENCOUNTER — APPOINTMENT (OUTPATIENT)
Dept: ULTRASOUND IMAGING | Facility: HOSPITAL | Age: 24
End: 2024-12-13
Payer: MEDICAID

## 2024-12-13 VITALS
TEMPERATURE: 98.2 F | DIASTOLIC BLOOD PRESSURE: 81 MMHG | HEART RATE: 85 BPM | WEIGHT: 175 LBS | SYSTOLIC BLOOD PRESSURE: 109 MMHG | OXYGEN SATURATION: 99 % | HEIGHT: 66 IN | BODY MASS INDEX: 28.12 KG/M2 | RESPIRATION RATE: 18 BRPM

## 2024-12-13 DIAGNOSIS — O20.9 VAGINAL BLEEDING AFFECTING EARLY PREGNANCY: Primary | ICD-10-CM

## 2024-12-13 LAB
ABO GROUP BLD: NORMAL
ALBUMIN SERPL-MCNC: 4.7 G/DL (ref 3.5–5.2)
ALBUMIN/GLOB SERPL: 1.7 G/DL
ALP SERPL-CCNC: 101 U/L (ref 39–117)
ALT SERPL W P-5'-P-CCNC: 12 U/L (ref 1–33)
ANION GAP SERPL CALCULATED.3IONS-SCNC: 12.4 MMOL/L (ref 5–15)
AST SERPL-CCNC: 15 U/L (ref 1–32)
B-HCG UR QL: POSITIVE
BASOPHILS # BLD AUTO: 0.04 10*3/MM3 (ref 0–0.2)
BASOPHILS NFR BLD AUTO: 0.5 % (ref 0–1.5)
BILIRUB SERPL-MCNC: 0.6 MG/DL (ref 0–1.2)
BLD GP AB SCN SERPL QL: NEGATIVE
BUN SERPL-MCNC: 10 MG/DL (ref 6–20)
BUN/CREAT SERPL: 12.8 (ref 7–25)
CALCIUM SPEC-SCNC: 9.6 MG/DL (ref 8.6–10.5)
CHLORIDE SERPL-SCNC: 106 MMOL/L (ref 98–107)
CO2 SERPL-SCNC: 19.6 MMOL/L (ref 22–29)
CREAT SERPL-MCNC: 0.78 MG/DL (ref 0.57–1)
DEPRECATED RDW RBC AUTO: 39.8 FL (ref 37–54)
EGFRCR SERPLBLD CKD-EPI 2021: 107.6 ML/MIN/1.73
EOSINOPHIL # BLD AUTO: 0.11 10*3/MM3 (ref 0–0.4)
EOSINOPHIL NFR BLD AUTO: 1.3 % (ref 0.3–6.2)
ERYTHROCYTE [DISTWIDTH] IN BLOOD BY AUTOMATED COUNT: 12.5 % (ref 12.3–15.4)
GLOBULIN UR ELPH-MCNC: 2.7 GM/DL
GLUCOSE SERPL-MCNC: 112 MG/DL (ref 65–99)
HCG INTACT+B SERPL-ACNC: 78.56 MIU/ML
HCT VFR BLD AUTO: 42.3 % (ref 34–46.6)
HGB BLD-MCNC: 14.5 G/DL (ref 12–15.9)
IMM GRANULOCYTES # BLD AUTO: 0.02 10*3/MM3 (ref 0–0.05)
IMM GRANULOCYTES NFR BLD AUTO: 0.2 % (ref 0–0.5)
LYMPHOCYTES # BLD AUTO: 2.74 10*3/MM3 (ref 0.7–3.1)
LYMPHOCYTES NFR BLD AUTO: 31.7 % (ref 19.6–45.3)
MCH RBC QN AUTO: 29.8 PG (ref 26.6–33)
MCHC RBC AUTO-ENTMCNC: 34.3 G/DL (ref 31.5–35.7)
MCV RBC AUTO: 86.9 FL (ref 79–97)
MONOCYTES # BLD AUTO: 0.56 10*3/MM3 (ref 0.1–0.9)
MONOCYTES NFR BLD AUTO: 6.5 % (ref 5–12)
NEUTROPHILS NFR BLD AUTO: 5.17 10*3/MM3 (ref 1.7–7)
NEUTROPHILS NFR BLD AUTO: 59.8 % (ref 42.7–76)
NRBC BLD AUTO-RTO: 0 /100 WBC (ref 0–0.2)
PLATELET # BLD AUTO: 241 10*3/MM3 (ref 140–450)
PMV BLD AUTO: 10.5 FL (ref 6–12)
POTASSIUM SERPL-SCNC: 4 MMOL/L (ref 3.5–5.2)
PROT SERPL-MCNC: 7.4 G/DL (ref 6–8.5)
RBC # BLD AUTO: 4.87 10*6/MM3 (ref 3.77–5.28)
RH BLD: POSITIVE
SODIUM SERPL-SCNC: 138 MMOL/L (ref 136–145)
T&S EXPIRATION DATE: NORMAL
WBC NRBC COR # BLD AUTO: 8.64 10*3/MM3 (ref 3.4–10.8)

## 2024-12-13 PROCEDURE — 84702 CHORIONIC GONADOTROPIN TEST: CPT | Performed by: NURSE PRACTITIONER

## 2024-12-13 PROCEDURE — 81025 URINE PREGNANCY TEST: CPT | Performed by: NURSE PRACTITIONER

## 2024-12-13 PROCEDURE — 80053 COMPREHEN METABOLIC PANEL: CPT | Performed by: NURSE PRACTITIONER

## 2024-12-13 PROCEDURE — 86850 RBC ANTIBODY SCREEN: CPT | Performed by: NURSE PRACTITIONER

## 2024-12-13 PROCEDURE — 86901 BLOOD TYPING SEROLOGIC RH(D): CPT | Performed by: NURSE PRACTITIONER

## 2024-12-13 PROCEDURE — 85025 COMPLETE CBC W/AUTO DIFF WBC: CPT | Performed by: NURSE PRACTITIONER

## 2024-12-13 PROCEDURE — 99284 EMERGENCY DEPT VISIT MOD MDM: CPT | Performed by: STUDENT IN AN ORGANIZED HEALTH CARE EDUCATION/TRAINING PROGRAM

## 2024-12-13 PROCEDURE — 86900 BLOOD TYPING SEROLOGIC ABO: CPT | Performed by: NURSE PRACTITIONER

## 2024-12-13 PROCEDURE — 76817 TRANSVAGINAL US OBSTETRIC: CPT

## 2024-12-13 NOTE — Clinical Note
[facility] EMERGENCY DEPARTMENT  [address] [address]  Phone: [phone]    Pamela Pena was seen and treated in our emergency department on 12/13/2024.  She may return to work on 12/14/2024.         Thank you for choosing [facility].    Jah Segura APRN
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
## 2024-12-16 ENCOUNTER — HOSPITAL ENCOUNTER (EMERGENCY)
Facility: HOSPITAL | Age: 24
Discharge: HOME | End: 2024-12-16
Attending: EMERGENCY MEDICINE | Admitting: EMERGENCY MEDICINE
Payer: MEDICAID

## 2024-12-16 ENCOUNTER — TELEPHONE (OUTPATIENT)
Dept: OBSTETRICS AND GYNECOLOGY | Facility: CLINIC | Age: 24
End: 2024-12-16
Payer: MEDICAID

## 2024-12-16 VITALS
DIASTOLIC BLOOD PRESSURE: 75 MMHG | TEMPERATURE: 98.1 F | WEIGHT: 197.6 LBS | SYSTOLIC BLOOD PRESSURE: 112 MMHG | RESPIRATION RATE: 14 BRPM | HEIGHT: 66 IN | BODY MASS INDEX: 31.76 KG/M2 | HEART RATE: 84 BPM | OXYGEN SATURATION: 98 %

## 2024-12-16 DIAGNOSIS — B34.9 ACUTE VIRAL SYNDROME: Primary | ICD-10-CM

## 2024-12-16 DIAGNOSIS — Z32.00 POSSIBLE PREGNANCY, NOT CONFIRMED: Primary | ICD-10-CM

## 2024-12-16 LAB
B PARAPERT DNA SPEC QL NAA+PROBE: NOT DETECTED
B PERT DNA SPEC QL NAA+PROBE: NOT DETECTED
BACTERIA UR QL AUTO: ABNORMAL /HPF
BILIRUB UR QL STRIP: NEGATIVE
C PNEUM DNA NPH QL NAA+NON-PROBE: NOT DETECTED
CLARITY UR: ABNORMAL
COLOR UR: YELLOW
FLUAV SUBTYP SPEC NAA+PROBE: NOT DETECTED
FLUBV RNA ISLT QL NAA+PROBE: NOT DETECTED
GLUCOSE UR STRIP-MCNC: NEGATIVE MG/DL
HADV DNA SPEC NAA+PROBE: NOT DETECTED
HCG INTACT+B SERPL-ACNC: 285.7 MIU/ML
HCOV 229E RNA SPEC QL NAA+PROBE: NOT DETECTED
HCOV HKU1 RNA SPEC QL NAA+PROBE: NOT DETECTED
HCOV NL63 RNA SPEC QL NAA+PROBE: NOT DETECTED
HCOV OC43 RNA SPEC QL NAA+PROBE: NOT DETECTED
HGB UR QL STRIP.AUTO: NEGATIVE
HMPV RNA NPH QL NAA+NON-PROBE: NOT DETECTED
HPIV1 RNA ISLT QL NAA+PROBE: NOT DETECTED
HPIV2 RNA SPEC QL NAA+PROBE: NOT DETECTED
HPIV3 RNA NPH QL NAA+PROBE: NOT DETECTED
HPIV4 P GENE NPH QL NAA+PROBE: NOT DETECTED
HYALINE CASTS UR QL AUTO: ABNORMAL /LPF
KETONES UR QL STRIP: ABNORMAL
LEUKOCYTE ESTERASE UR QL STRIP.AUTO: ABNORMAL
M PNEUMO IGG SER IA-ACNC: NOT DETECTED
MUCOUS THREADS URNS QL MICRO: ABNORMAL /HPF
NITRITE UR QL STRIP: NEGATIVE
PH UR STRIP.AUTO: 5.5 [PH] (ref 5–8)
PROT UR QL STRIP: ABNORMAL
RBC # UR STRIP: ABNORMAL /HPF
REF LAB TEST METHOD: ABNORMAL
RHINOVIRUS RNA SPEC NAA+PROBE: NOT DETECTED
RSV RNA NPH QL NAA+NON-PROBE: NOT DETECTED
S PYO AG THROAT QL: NEGATIVE
SARS-COV-2 RNA NPH QL NAA+NON-PROBE: NOT DETECTED
SP GR UR STRIP: >=1.03 (ref 1–1.03)
SQUAMOUS #/AREA URNS HPF: ABNORMAL /HPF
UROBILINOGEN UR QL STRIP: ABNORMAL
WBC # UR STRIP: ABNORMAL /HPF

## 2024-12-16 PROCEDURE — 87081 CULTURE SCREEN ONLY: CPT | Performed by: EMERGENCY MEDICINE

## 2024-12-16 PROCEDURE — 87086 URINE CULTURE/COLONY COUNT: CPT | Performed by: EMERGENCY MEDICINE

## 2024-12-16 PROCEDURE — 0202U NFCT DS 22 TRGT SARS-COV-2: CPT | Performed by: EMERGENCY MEDICINE

## 2024-12-16 PROCEDURE — 84702 CHORIONIC GONADOTROPIN TEST: CPT

## 2024-12-16 PROCEDURE — 87880 STREP A ASSAY W/OPTIC: CPT | Performed by: EMERGENCY MEDICINE

## 2024-12-16 PROCEDURE — 81001 URINALYSIS AUTO W/SCOPE: CPT | Performed by: EMERGENCY MEDICINE

## 2024-12-16 PROCEDURE — 99283 EMERGENCY DEPT VISIT LOW MDM: CPT | Performed by: EMERGENCY MEDICINE
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
## 2024-12-16 NOTE — DISCHARGE INSTRUCTIONS
You have been evaluated for flulike symptoms.  Your respiratory panel was negative for COVID flu, RSV, and other viruses, strep test was negative.  Your beta hCG levels were checked again today and had increased to 285 up from 78 just 3 days ago.    We encourage you to follow-up closely with your primary care provider as well as your OB/GYN as soon as possible.

## 2024-12-16 NOTE — TELEPHONE ENCOUNTER
Pt went to the Er and they stated, she needs to follow up with us to repeat her hcg for possible miscarriage. She needs a order to have that drawn and she has an appt 01/08.

## 2024-12-18 LAB
BACTERIA SPEC AEROBE CULT: NO GROWTH
BACTERIA SPEC AEROBE CULT: NORMAL

## 2025-01-08 ENCOUNTER — REFERRAL TRIAGE (OUTPATIENT)
Dept: LABOR AND DELIVERY | Facility: HOSPITAL | Age: 25
End: 2025-01-08
Payer: MEDICAID

## 2025-01-08 ENCOUNTER — INITIAL PRENATAL (OUTPATIENT)
Dept: OBSTETRICS AND GYNECOLOGY | Facility: CLINIC | Age: 25
End: 2025-01-08
Payer: MEDICAID

## 2025-01-08 VITALS — BODY MASS INDEX: 31.6 KG/M2 | SYSTOLIC BLOOD PRESSURE: 102 MMHG | WEIGHT: 195.8 LBS | DIASTOLIC BLOOD PRESSURE: 64 MMHG

## 2025-01-08 DIAGNOSIS — Z12.4 SCREENING FOR CERVICAL CANCER: ICD-10-CM

## 2025-01-08 DIAGNOSIS — O36.80X0 ENCOUNTER TO DETERMINE FETAL VIABILITY OF PREGNANCY, SINGLE OR UNSPECIFIED FETUS: ICD-10-CM

## 2025-01-08 DIAGNOSIS — Z34.90 PREGNANCY, UNSPECIFIED GESTATIONAL AGE: Primary | ICD-10-CM

## 2025-01-08 RX ORDER — DIPHENHYDRAMINE HYDROCHLORIDE 25 MG/1
25 CAPSULE ORAL 3 TIMES DAILY
Qty: 90 TABLET | Refills: 2 | Status: SHIPPED | OUTPATIENT
Start: 2025-01-08
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
## 2025-01-09 LAB
A VAGINAE DNA VAG QL NAA+PROBE: ABNORMAL SCORE
ABO GROUP BLD: NORMAL
AMPHETAMINES UR QL SCN: NEGATIVE NG/ML
BARBITURATES UR QL SCN: NEGATIVE NG/ML
BASOPHILS # BLD AUTO: 0 X10E3/UL (ref 0–0.2)
BASOPHILS NFR BLD AUTO: 0 %
BENZODIAZ UR QL SCN: NEGATIVE NG/ML
BLD GP AB SCN SERPL QL: NEGATIVE
BVAB2 DNA VAG QL NAA+PROBE: ABNORMAL SCORE
BZE UR QL SCN: NEGATIVE NG/ML
C ALBICANS DNA VAG QL NAA+PROBE: NEGATIVE
C GLABRATA DNA VAG QL NAA+PROBE: NEGATIVE
CANNABINOIDS UR QL SCN: NEGATIVE NG/ML
CREAT UR-MCNC: 223.3 MG/DL (ref 20–300)
EOSINOPHIL # BLD AUTO: 0 X10E3/UL (ref 0–0.4)
EOSINOPHIL NFR BLD AUTO: 1 %
ERYTHROCYTE [DISTWIDTH] IN BLOOD BY AUTOMATED COUNT: 12.3 % (ref 11.7–15.4)
HBV SURFACE AG SERPL QL IA: NEGATIVE
HCT VFR BLD AUTO: 43.2 % (ref 34–46.6)
HCV AB SERPL QL IA: NORMAL
HCV IGG SERPL QL IA: NON REACTIVE
HGB BLD-MCNC: 14.2 G/DL (ref 11.1–15.9)
HIV 1+2 AB+HIV1 P24 AG SERPL QL IA: NON REACTIVE
IMM GRANULOCYTES # BLD AUTO: 0 X10E3/UL (ref 0–0.1)
IMM GRANULOCYTES NFR BLD AUTO: 0 %
LABORATORY COMMENT REPORT: NORMAL
LYMPHOCYTES # BLD AUTO: 2 X10E3/UL (ref 0.7–3.1)
LYMPHOCYTES NFR BLD AUTO: 25 %
MCH RBC QN AUTO: 29.7 PG (ref 26.6–33)
MCHC RBC AUTO-ENTMCNC: 32.9 G/DL (ref 31.5–35.7)
MCV RBC AUTO: 90 FL (ref 79–97)
MEGA1 DNA VAG QL NAA+PROBE: ABNORMAL SCORE
METHADONE UR QL SCN: NEGATIVE NG/ML
MONOCYTES # BLD AUTO: 0.5 X10E3/UL (ref 0.1–0.9)
MONOCYTES NFR BLD AUTO: 6 %
NEUTROPHILS # BLD AUTO: 5.4 X10E3/UL (ref 1.4–7)
NEUTROPHILS NFR BLD AUTO: 68 %
OPIATES UR QL SCN: NEGATIVE NG/ML
OXYCODONE+OXYMORPHONE UR QL SCN: NEGATIVE NG/ML
PCP UR QL: NEGATIVE NG/ML
PH UR: 5.6 [PH] (ref 4.5–8.9)
PLATELET # BLD AUTO: 252 X10E3/UL (ref 150–450)
PROPOXYPH UR QL SCN: NEGATIVE NG/ML
RBC # BLD AUTO: 4.78 X10E6/UL (ref 3.77–5.28)
REF LAB TEST METHOD: NORMAL
RH BLD: POSITIVE
RPR SER QL: NON REACTIVE
RUBV IGG SERPL IA-ACNC: 1 INDEX
WBC # BLD AUTO: 7.9 X10E3/UL (ref 3.4–10.8)

## 2025-01-13 RX ORDER — METRONIDAZOLE 500 MG/1
500 TABLET ORAL 2 TIMES DAILY
Qty: 14 TABLET | Refills: 0 | Status: SHIPPED | OUTPATIENT
Start: 2025-01-13 | End: 2025-01-20

## 2025-01-22 ENCOUNTER — APPOINTMENT (OUTPATIENT)
Dept: ULTRASOUND IMAGING | Facility: HOSPITAL | Age: 25
End: 2025-01-22
Payer: MEDICAID

## 2025-01-22 ENCOUNTER — HOSPITAL ENCOUNTER (EMERGENCY)
Facility: HOSPITAL | Age: 25
Discharge: HOME | End: 2025-01-22
Attending: EMERGENCY MEDICINE
Payer: MEDICAID

## 2025-01-22 VITALS
BODY MASS INDEX: 31.37 KG/M2 | RESPIRATION RATE: 18 BRPM | WEIGHT: 195.2 LBS | HEIGHT: 66 IN | OXYGEN SATURATION: 100 % | SYSTOLIC BLOOD PRESSURE: 104 MMHG | TEMPERATURE: 98.1 F | HEART RATE: 71 BPM | DIASTOLIC BLOOD PRESSURE: 65 MMHG

## 2025-01-22 DIAGNOSIS — O21.0 HYPEREMESIS GRAVIDARUM: Primary | ICD-10-CM

## 2025-01-22 LAB
ALBUMIN SERPL-MCNC: 4.4 G/DL (ref 3.5–5.2)
ALBUMIN/GLOB SERPL: 1.7 G/DL
ALP SERPL-CCNC: 73 U/L (ref 39–117)
ALT SERPL W P-5'-P-CCNC: 11 U/L (ref 1–33)
ANION GAP SERPL CALCULATED.3IONS-SCNC: 13.2 MMOL/L (ref 5–15)
AST SERPL-CCNC: 16 U/L (ref 1–32)
BACTERIA UR QL AUTO: ABNORMAL /HPF
BASOPHILS # BLD AUTO: 0.03 10*3/MM3 (ref 0–0.2)
BASOPHILS NFR BLD AUTO: 0.4 % (ref 0–1.5)
BILIRUB SERPL-MCNC: 0.4 MG/DL (ref 0–1.2)
BILIRUB UR QL STRIP: NEGATIVE
BUN SERPL-MCNC: 6 MG/DL (ref 6–20)
BUN/CREAT SERPL: 11.8 (ref 7–25)
CALCIUM SPEC-SCNC: 9.4 MG/DL (ref 8.6–10.5)
CHLORIDE SERPL-SCNC: 106 MMOL/L (ref 98–107)
CLARITY UR: ABNORMAL
CO2 SERPL-SCNC: 21.8 MMOL/L (ref 22–29)
COLOR UR: YELLOW
CREAT SERPL-MCNC: 0.51 MG/DL (ref 0.57–1)
DEPRECATED RDW RBC AUTO: 38.2 FL (ref 37–54)
EGFRCR SERPLBLD CKD-EPI 2021: 132.2 ML/MIN/1.73
EOSINOPHIL # BLD AUTO: 0.08 10*3/MM3 (ref 0–0.4)
EOSINOPHIL NFR BLD AUTO: 1 % (ref 0.3–6.2)
ERYTHROCYTE [DISTWIDTH] IN BLOOD BY AUTOMATED COUNT: 12.4 % (ref 12.3–15.4)
GLOBULIN UR ELPH-MCNC: 2.6 GM/DL
GLUCOSE SERPL-MCNC: 88 MG/DL (ref 65–99)
GLUCOSE UR STRIP-MCNC: NEGATIVE MG/DL
HCG INTACT+B SERPL-ACNC: NORMAL MIU/ML
HCT VFR BLD AUTO: 39.3 % (ref 34–46.6)
HGB BLD-MCNC: 13.8 G/DL (ref 12–15.9)
HGB UR QL STRIP.AUTO: NEGATIVE
HYALINE CASTS UR QL AUTO: ABNORMAL /LPF
IMM GRANULOCYTES # BLD AUTO: 0.02 10*3/MM3 (ref 0–0.05)
IMM GRANULOCYTES NFR BLD AUTO: 0.2 % (ref 0–0.5)
KETONES UR QL STRIP: ABNORMAL
LEUKOCYTE ESTERASE UR QL STRIP.AUTO: ABNORMAL
LIPASE SERPL-CCNC: 41 U/L (ref 13–60)
LYMPHOCYTES # BLD AUTO: 2.16 10*3/MM3 (ref 0.7–3.1)
LYMPHOCYTES NFR BLD AUTO: 26 % (ref 19.6–45.3)
MAGNESIUM SERPL-MCNC: 1.8 MG/DL (ref 1.6–2.6)
MCH RBC QN AUTO: 29.8 PG (ref 26.6–33)
MCHC RBC AUTO-ENTMCNC: 35.1 G/DL (ref 31.5–35.7)
MCV RBC AUTO: 84.9 FL (ref 79–97)
MONOCYTES # BLD AUTO: 0.48 10*3/MM3 (ref 0.1–0.9)
MONOCYTES NFR BLD AUTO: 5.8 % (ref 5–12)
MUCOUS THREADS URNS QL MICRO: ABNORMAL /HPF
NEUTROPHILS NFR BLD AUTO: 5.53 10*3/MM3 (ref 1.7–7)
NEUTROPHILS NFR BLD AUTO: 66.6 % (ref 42.7–76)
NITRITE UR QL STRIP: NEGATIVE
NRBC BLD AUTO-RTO: 0 /100 WBC (ref 0–0.2)
PH UR STRIP.AUTO: 6.5 [PH] (ref 5–8)
PLATELET # BLD AUTO: 216 10*3/MM3 (ref 140–450)
PMV BLD AUTO: 10.5 FL (ref 6–12)
POTASSIUM SERPL-SCNC: 3.9 MMOL/L (ref 3.5–5.2)
PROT SERPL-MCNC: 7 G/DL (ref 6–8.5)
PROT UR QL STRIP: ABNORMAL
RBC # BLD AUTO: 4.63 10*6/MM3 (ref 3.77–5.28)
RBC # UR STRIP: ABNORMAL /HPF
REF LAB TEST METHOD: ABNORMAL
SODIUM SERPL-SCNC: 141 MMOL/L (ref 136–145)
SP GR UR STRIP: 1.02 (ref 1–1.03)
SQUAMOUS #/AREA URNS HPF: ABNORMAL /HPF
UROBILINOGEN UR QL STRIP: ABNORMAL
WBC # UR STRIP: ABNORMAL /HPF
WBC NRBC COR # BLD AUTO: 8.3 10*3/MM3 (ref 3.4–10.8)

## 2025-01-22 PROCEDURE — 81001 URINALYSIS AUTO W/SCOPE: CPT | Performed by: EMERGENCY MEDICINE

## 2025-01-22 PROCEDURE — 84702 CHORIONIC GONADOTROPIN TEST: CPT | Performed by: EMERGENCY MEDICINE

## 2025-01-22 PROCEDURE — 93005 ELECTROCARDIOGRAM TRACING: CPT | Performed by: EMERGENCY MEDICINE

## 2025-01-22 PROCEDURE — 99284 EMERGENCY DEPT VISIT MOD MDM: CPT | Performed by: EMERGENCY MEDICINE

## 2025-01-22 PROCEDURE — 85025 COMPLETE CBC W/AUTO DIFF WBC: CPT | Performed by: EMERGENCY MEDICINE

## 2025-01-22 PROCEDURE — 83735 ASSAY OF MAGNESIUM: CPT | Performed by: EMERGENCY MEDICINE

## 2025-01-22 PROCEDURE — 83690 ASSAY OF LIPASE: CPT | Performed by: EMERGENCY MEDICINE

## 2025-01-22 PROCEDURE — 25010000002 THIAMINE HCL 200 MG/2ML SOLUTION: Performed by: EMERGENCY MEDICINE

## 2025-01-22 PROCEDURE — 96361 HYDRATE IV INFUSION ADD-ON: CPT

## 2025-01-22 PROCEDURE — 25010000002 METOCLOPRAMIDE PER 10 MG: Performed by: EMERGENCY MEDICINE

## 2025-01-22 PROCEDURE — 80053 COMPREHEN METABOLIC PANEL: CPT | Performed by: EMERGENCY MEDICINE

## 2025-01-22 PROCEDURE — 25010000002 ONDANSETRON PER 1 MG: Performed by: EMERGENCY MEDICINE

## 2025-01-22 PROCEDURE — 96375 TX/PRO/DX INJ NEW DRUG ADDON: CPT

## 2025-01-22 PROCEDURE — 25810000003 DEXTROSE 5 % AND SODIUM CHLORIDE 0.9 % 5-0.9 % SOLUTION: Performed by: EMERGENCY MEDICINE

## 2025-01-22 PROCEDURE — 25010000002 DIPHENHYDRAMINE PER 50 MG: Performed by: EMERGENCY MEDICINE

## 2025-01-22 PROCEDURE — 96374 THER/PROPH/DIAG INJ IV PUSH: CPT

## 2025-01-22 PROCEDURE — 76705 ECHO EXAM OF ABDOMEN: CPT

## 2025-01-22 RX ORDER — PROMETHAZINE HYDROCHLORIDE 25 MG/1
25 SUPPOSITORY RECTAL EVERY 6 HOURS PRN
Qty: 15 SUPPOSITORY | Refills: 0 | Status: SHIPPED | OUTPATIENT
Start: 2025-01-22

## 2025-01-22 RX ORDER — DIPHENHYDRAMINE HYDROCHLORIDE 50 MG/ML
12.5 INJECTION INTRAMUSCULAR; INTRAVENOUS ONCE
Status: COMPLETED | OUTPATIENT
Start: 2025-01-22 | End: 2025-01-22

## 2025-01-22 RX ORDER — FAMOTIDINE 10 MG/ML
20 INJECTION, SOLUTION INTRAVENOUS ONCE
Status: COMPLETED | OUTPATIENT
Start: 2025-01-22 | End: 2025-01-22

## 2025-01-22 RX ORDER — THIAMINE HYDROCHLORIDE 100 MG/ML
100 INJECTION, SOLUTION INTRAMUSCULAR; INTRAVENOUS ONCE
Status: COMPLETED | OUTPATIENT
Start: 2025-01-22 | End: 2025-01-22

## 2025-01-22 RX ORDER — METOCLOPRAMIDE 10 MG/1
10 TABLET ORAL
Qty: 15 TABLET | Refills: 0 | Status: SHIPPED | OUTPATIENT
Start: 2025-01-22

## 2025-01-22 RX ORDER — ONDANSETRON 2 MG/ML
4 INJECTION INTRAMUSCULAR; INTRAVENOUS ONCE
Status: COMPLETED | OUTPATIENT
Start: 2025-01-22 | End: 2025-01-22

## 2025-01-22 RX ORDER — METOCLOPRAMIDE HYDROCHLORIDE 5 MG/ML
10 INJECTION INTRAMUSCULAR; INTRAVENOUS ONCE
Status: COMPLETED | OUTPATIENT
Start: 2025-01-22 | End: 2025-01-22

## 2025-01-22 RX ORDER — SODIUM CHLORIDE 0.9 % (FLUSH) 0.9 %
10 SYRINGE (ML) INJECTION AS NEEDED
Status: DISCONTINUED | OUTPATIENT
Start: 2025-01-22 | End: 2025-01-22 | Stop reason: HOSPADM

## 2025-01-22 RX ORDER — DEXTROSE MONOHYDRATE AND SODIUM CHLORIDE 5; .9 G/100ML; G/100ML
999 INJECTION, SOLUTION INTRAVENOUS CONTINUOUS
Status: ACTIVE | OUTPATIENT
Start: 2025-01-22 | End: 2025-01-22

## 2025-01-22 RX ADMIN — DEXTROSE AND SODIUM CHLORIDE 999 ML/HR: 5; 900 INJECTION, SOLUTION INTRAVENOUS at 14:44

## 2025-01-22 RX ADMIN — DIPHENHYDRAMINE HYDROCHLORIDE 12.5 MG: 50 INJECTION, SOLUTION INTRAMUSCULAR; INTRAVENOUS at 16:36

## 2025-01-22 RX ADMIN — THIAMINE HYDROCHLORIDE 100 MG: 100 INJECTION, SOLUTION INTRAMUSCULAR; INTRAVENOUS at 14:44

## 2025-01-22 RX ADMIN — ONDANSETRON 4 MG: 2 INJECTION INTRAMUSCULAR; INTRAVENOUS at 14:38

## 2025-01-22 RX ADMIN — METOCLOPRAMIDE 10 MG: 5 INJECTION, SOLUTION INTRAMUSCULAR; INTRAVENOUS at 16:38

## 2025-01-22 RX ADMIN — FAMOTIDINE 20 MG: 10 INJECTION, SOLUTION INTRAVENOUS at 14:40
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
## 2025-02-05 ENCOUNTER — TELEPHONE (OUTPATIENT)
Dept: OBSTETRICS AND GYNECOLOGY | Facility: CLINIC | Age: 25
End: 2025-02-05

## 2025-02-05 NOTE — TELEPHONE ENCOUNTER
Caller: Pamela Pena    Relationship:  Self    Best call back number: [phone]    PATIENT CALLED REQUESTING TO CANCEL SAME DAY APPT.    Did the patient call AFTER the start time of their scheduled appointment?  []YES  [x]NO    Was the patient's appointment rescheduled? [x]YES  []NO

## 2025-02-10 ENCOUNTER — ROUTINE PRENATAL (OUTPATIENT)
Dept: OBSTETRICS AND GYNECOLOGY | Facility: CLINIC | Age: 25
End: 2025-02-10
Payer: MEDICAID

## 2025-02-10 ENCOUNTER — PATIENT OUTREACH (OUTPATIENT)
Dept: LABOR AND DELIVERY | Facility: HOSPITAL | Age: 25
End: 2025-02-10
Payer: MEDICAID

## 2025-02-10 VITALS — DIASTOLIC BLOOD PRESSURE: 62 MMHG | BODY MASS INDEX: 31.15 KG/M2 | SYSTOLIC BLOOD PRESSURE: 114 MMHG | WEIGHT: 193 LBS

## 2025-02-10 DIAGNOSIS — N76.1 CHRONIC VAGINITIS: ICD-10-CM

## 2025-02-10 DIAGNOSIS — O09.91 ENCOUNTER FOR SUPERVISION OF HIGH RISK PREGNANCY IN FIRST TRIMESTER, ANTEPARTUM: Primary | ICD-10-CM

## 2025-02-10 DIAGNOSIS — O20.9 VAGINAL BLEEDING IN PREGNANCY, FIRST TRIMESTER: ICD-10-CM

## 2025-02-10 DIAGNOSIS — O21.9 NAUSEA AND VOMITING DURING PREGNANCY: ICD-10-CM

## 2025-02-10 RX ORDER — METOCLOPRAMIDE 10 MG/1
10 TABLET ORAL
Qty: 15 TABLET | Refills: 0 | Status: SHIPPED | OUTPATIENT
Start: 2025-02-10

## 2025-02-10 RX ORDER — METRONIDAZOLE 7.5 MG/G
1 GEL VAGINAL
Qty: 70 G | Refills: 0 | Status: SHIPPED | OUTPATIENT
Start: 2025-02-10 | End: 2025-02-15

## 2025-02-10 NOTE — OUTREACH NOTE
[facility]  Intake    Current Estimated Gestational Age: 12w2d    Intake Assessment      Flowsheet Row Responses   Best Method for Contacting Cell   Currently Employed Yes   Able to keep appointments as scheduled Yes   Resources Presently Utilizing: WIC (Women, Infant, Children)   Maternal Warning Signs Provided   Other Education Insurance benefits/Incentives, WIC Benefits                    Tobacco, Alcohol, and Drug History     reports that she has never smoked. She has never used smokeless tobacco.   reports that she does not currently use alcohol.   reports that she does not currently use drugs after having used the following drugs: Amphetamines, Marijuana, and Methamphetamines. Frequency: 2.00 times per week.    Maribel LOGAN  Maternity Nurse Navigator    2/10/2025, 18:30 EST      [facility]  Enrollment    Current Estimated Gestational Age: 12w2d    Questions/Answers      Flowsheet Row Responses   Would like to participate? Yes   Date of Intake Visit 02/10/25                Maribel LOGAN  Maternity Nurse Navigator    02/10/2025, 1830 EST

## 2025-02-10 NOTE — PROGRESS NOTES
Chief Complaint  Routine Prenatal Visit (Patient complains of heavy vaginal bleeding and cramping lasting x 2 days. Patient advised passed large clot. )    History of Present Illness:  Pamlea is a  currently at 12w2d who presents today with complaints of heavy vaginal bleeding.  Patient reports having the onset of heavy bleeding passing large clots on Thursday.  She had severe cramping as well.  She does report having recent intercourse.  She has continued to have a vaginal discharge with odor.  She reports the only thing that has helped has been MetroGel.  She does report completing her Flagyl previously.  She did have previous labs as noted.  She has been taking Reglan for her nausea and emesis.    Exam:  Vitals:  See prenatal flowsheet as noted and reviewed  General: Alert, cooperative, and does not appear in any distress  Abdomen:   See prenatal flowsheet as noted and reviewed    Uterus gravid, non-tender; no palpable masses    No guarding or rebound tenderness  Pelvic:  See prenatal flowsheet as noted and reviewed  Ext:  See prenatal flowsheet as noted and reviewed    Moves extremities well, no cyanosis and no redness  Urine:  See prenatal flowsheet as noted and reviewed    Data Review:  The following data was reviewed by: Nisa Hernandez MD on 02/10/2025:  Prenatal Labs:  Lab Results   Component Value Date    HGB 13.8 2025    RUBELLAABIGG 1.00 2025    HEPBSAG Negative 2025    ABO O 2025    RH Positive 2025    ABSCRN Negative 2025    [id number] Non Reactive 2025    HEPCVIRUSABY Non-Reactive 2022    STREPGPB Negative 2021    URINECX No growth 2024       Admission on 2025, Discharged on 2025   Component Date Value    Glucose 2025 88     BUN 2025 6     Creatinine 2025 0.51 (L)     Sodium 2025 141     Potassium 2025 3.9     Chloride 2025 106     CO2 2025 21.8 (L)     Calcium 2025 9.4     Total  Protein 01/22/2025 7.0     Albumin 01/22/2025 4.4     ALT (SGPT) 01/22/2025 11     AST (SGOT) 01/22/2025 16     Alkaline Phosphatase 01/22/2025 73     Total Bilirubin 01/22/2025 0.4     Globulin 01/22/2025 2.6     A/G Ratio 01/22/2025 1.7     BUN/Creatinine Ratio 01/22/2025 11.8     Anion Gap 01/22/2025 13.2     eGFR 01/22/2025 132.2     Lipase 01/22/2025 41     HCG Quantitative 01/22/2025 110,571.00     Color, UA 01/22/2025 Yellow     Appearance, UA 01/22/2025 Cloudy (A)     pH, UA 01/22/2025 6.5     Specific Gravity, UA 01/22/2025 1.022     Glucose, UA 01/22/2025 Negative     Ketones, UA 01/22/2025 Trace (A)     Bilirubin, UA 01/22/2025 Negative     Blood, UA 01/22/2025 Negative     Protein, UA 01/22/2025 Trace (A)     Leuk Esterase, UA 01/22/2025 Small (1+) (A)     Nitrite, UA 01/22/2025 Negative     Urobilinogen, UA 01/22/2025 1.0 E.U./dL     Magnesium 01/22/2025 1.8     WBC 01/22/2025 8.30     RBC 01/22/2025 4.63     Hemoglobin 01/22/2025 13.8     Hematocrit 01/22/2025 39.3     MCV 01/22/2025 84.9     MCH 01/22/2025 29.8     MCHC 01/22/2025 35.1     RDW 01/22/2025 12.4     RDW-SD 01/22/2025 38.2     MPV 01/22/2025 10.5     Platelets 01/22/2025 216     Neutrophil % 01/22/2025 66.6     Lymphocyte % 01/22/2025 26.0     Monocyte % 01/22/2025 5.8     Eosinophil % 01/22/2025 1.0     Basophil % 01/22/2025 0.4     Immature Grans % 01/22/2025 0.2     Neutrophils, Absolute 01/22/2025 5.53     Lymphocytes, Absolute 01/22/2025 2.16     Monocytes, Absolute 01/22/2025 0.48     Eosinophils, Absolute 01/22/2025 0.08     Basophils, Absolute 01/22/2025 0.03     Immature Grans, Absolute 01/22/2025 0.02     nRBC 01/22/2025 0.0     RBC, UA 01/22/2025 None Seen     WBC, UA 01/22/2025 0-2     Bacteria, UA 01/22/2025 2+ (A)     Squamous Epithelial Cell* 01/22/2025 7-12 (A)     Hyaline Casts, UA 01/22/2025 None Seen     Mucus, UA 01/22/2025 Moderate/2+ (A)     Methodology 01/22/2025 Manual Light Microscopy      Imaging:  US Ob Limited  1 + Fetuses  Pamela Pena  : 2000  MRN: [id number]  Date: 2/10/2025    Reason for exam/History:  vaginal bleeding    Ultrasound images are reviewed.  There is noted to be a viable   intrauterine pregnancy.   The fetal heart rate was normal.   The fetal   heart rate was 151 beats per minute.  The fetus was noted to be active.      The cervix and bilateral ovaries appear normal.  There is no evidence of   subchorionic hematoma.    The exam limitations noted:  none    See ultrasound report for measurements and structures identified.    Nisa Hernandez MD, [facility]  OB GYN [place]      Medical Records:  None    Assessment and Plan:  Problem List Items Addressed This Visit    None  Visit Diagnoses       Encounter for supervision of high risk pregnancy in first trimester, antepartum    -  Primary  Topics discussed:     ab precautions  genetic screening - Today we discussed genetic testing.  She is aware that the MSAFP-4 and NIPT - [id number] is a screening test.  A screening test is not a diagnostic test.  This means that a negative test does not guarantee an unaffected fetus and a positive test does not mean the fetus has the condition for which the test is being performed.  If the test returns positive, a diagnostic test should be consider to determine if the fetus in fact has the condition.  After considering the options previously presented, she is interested in having genetic testing performed.  GERD management  kick counts and fetal movement  PIH precautions  Genetic screening today as noted.    Relevant Orders    [id number] PLUS Core+SCA - Blood,    Vaginal bleeding in pregnancy, first trimester      Patient inform regarding the findings on ultrasound.  Threatened AB precautions have been given.  Recommend pelvic rest.  Patient to follow-up as discussed.    Relevant Orders    US Ob Limited 1 + Fetuses (Completed)    Chronic vaginitis      Scription's given for MetroGel.  Patient to  call if worsening and/or changes in her symptoms.    Relevant Medications    metroNIDAZOLE (METROGEL) 0.75 % vaginal gel    Nausea and vomiting during pregnancy      Prescription is given for Reglan.  Patient is informed not to mix with Phenergan.  She is to call if worsening and/or changes in her symptoms as discussed.    Relevant Medications    metoclopramide (REGLAN) 10 MG tablet          Follow Up/Instructions:  Follow up as scheduled.  Patient was given instructions and counseling regarding her condition or for health maintenance advice. Please see specific information pulled into the AVS if appropriate.     Note: Speech recognition transcription software may have been used to dictate portions of this document.  An attempt at proofreading has been made though minor errors in transcription may still be present.    This note was electronically signed.  Nisa Hernandez M.D.

## 2025-02-14 LAB
CFDNA.FET/CFDNA.TOTAL SFR FETUS: NORMAL %
CITATION REF LAB TEST: NORMAL
FET 13+18+21+X+Y ANEUP PLAS.CFDNA: NEGATIVE
FET CHR 21 TS PLAS.CFDNA QL: NEGATIVE
FET MS X RISK WBC.DNA+CFDNA QL: NOT DETECTED
FET SEX PLAS.CFDNA DOSAGE CFDNA: NORMAL
FET TS 13 RISK PLAS.CFDNA QL: NEGATIVE
FET TS 18 RISK WBC.DNA+CFDNA QL: NEGATIVE
FET X + Y ANEUP RISK PLAS.CFDNA SEQ-IMP: NOT DETECTED
GA EST FROM CONCEPTION DATE: NORMAL D
GESTATIONAL AGE > 9:: YES
LAB DIRECTOR NAME PROVIDER: NORMAL
LAB DIRECTOR NAME PROVIDER: NORMAL
LABORATORY COMMENT REPORT: NORMAL
LIMITATIONS OF THE TEST: NORMAL
NEGATIVE PREDICTIVE VALUE: NORMAL
PERFORMANCE CHARACTERISTICS: NORMAL
POSITIVE PREDICTIVE VALUE: NORMAL
REF LAB TEST METHOD: NORMAL
SERVICE CMNT-IMP: NORMAL
TEST PERFORMANCE INFO SPEC: NORMAL

## 2025-02-17 ENCOUNTER — TELEPHONE (OUTPATIENT)
Dept: OBSTETRICS AND GYNECOLOGY | Facility: CLINIC | Age: 25
End: 2025-02-17
Payer: MEDICAID

## 2025-02-17 NOTE — TELEPHONE ENCOUNTER
Caller: Pamela Pena    Relationship: Self    Best call back number: [phone]    What is the best time to reach you: ANY    Who are you requesting to speak with (clinical staff, provider,  specific staff member): CLINICAL     What was the call regarding: PT RETURNING CALL FOR RESULTS; UNABLE TO WT

## 2025-02-24 ENCOUNTER — ROUTINE PRENATAL (OUTPATIENT)
Dept: OBSTETRICS AND GYNECOLOGY | Facility: CLINIC | Age: 25
End: 2025-02-24
Payer: MEDICAID

## 2025-02-24 ENCOUNTER — PATIENT OUTREACH (OUTPATIENT)
Dept: LABOR AND DELIVERY | Facility: HOSPITAL | Age: 25
End: 2025-02-24
Payer: MEDICAID

## 2025-02-24 VITALS — SYSTOLIC BLOOD PRESSURE: 104 MMHG | BODY MASS INDEX: 31.02 KG/M2 | WEIGHT: 192.2 LBS | DIASTOLIC BLOOD PRESSURE: 66 MMHG

## 2025-02-24 DIAGNOSIS — Z34.92 SECOND TRIMESTER PREGNANCY: Primary | ICD-10-CM

## 2025-02-24 NOTE — PROGRESS NOTES
Chief Complaint   Patient presents with    Routine Prenatal Visit     Prenatal visit with no problems or concerns        HPI:   , 14w2d gestation reports doing well    ROS:  See Prenatal Episode/Flowsheet  /66   Wt 87.2 kg (192 lb 3.2 oz)   LMP 10/01/2024   BMI 31.02 kg/m²      EXAM:  EXTREMITIES:  No swelling-See Prenatal Episode/Flowsheet    ABDOMEN:  FHTs/Movement noted-See Prenatal Episode/Flowsheet    URINE GLUCOSE/PROTEIN:  See Prenatal Episode/Flowsheet    PELVIC EXAM:  See Prenatal Episode/Flowsheet  CV:  Lungs:  GYN:    MDM:    Lab Results   Component Value Date    HGB 13.8 2025    RUBELLAABIGG 1.00 2025    HEPBSAG Negative 2025    ABO O 2025    RH Positive 2025    ABSCRN Negative 2025    [id number] Non Reactive 2025    HEPCVIRUSABY Non-Reactive 2022    STREPGPB Negative 2021    URINECX No growth 2024       U/S:US Ob Limited 1 + Fetuses (02/10/2025 15:08)     1. IUP 14w2d  2. Routine care   3.  Normal labs and genetic testing  4.  Vaginal bleeding 2 weeks ago none since.  Continue pelvic rest

## 2025-02-25 NOTE — OUTREACH NOTE
[facility]  Check-In    Current Estimated Gestational Age: 14w2d      Questions/Answers      Flowsheet Row Responses   Best Method for Contacting Cell   Demographics Reviewed Yes   Currently Employed Yes   Able to keep appointments as scheduled Yes   Gender(s) and Name(s) Boy   Baby Active/Feeling Fetal Movemen Yes   How are you presently feeling? Good   Questions regarding prenatal visits or tests to be ordered? No   Education related to new diagnoses/home equipment No   Resource/Environmental Concerns None   Do you have any questions related to your care experience, your pregnancy, plans for delivery, any concerns, etc? No   Other Education How to find a pediatrician, How to find a dentist, How to find a primary care provider, Smoking/Vaping Cessation            Met with pt in office. Pt denies any needs, questions, or concerns at this time. RN to follow up next visit about substance abuse hx.    aMribel LOGAN  Maternity Nurse Navigator    2/24/2025, 19:00 EST

## 2025-03-25 ENCOUNTER — ROUTINE PRENATAL (OUTPATIENT)
Dept: OBSTETRICS AND GYNECOLOGY | Facility: CLINIC | Age: 25
End: 2025-03-25
Payer: MEDICAID

## 2025-03-25 VITALS — WEIGHT: 194 LBS | DIASTOLIC BLOOD PRESSURE: 64 MMHG | SYSTOLIC BLOOD PRESSURE: 108 MMHG | BODY MASS INDEX: 31.31 KG/M2

## 2025-03-25 DIAGNOSIS — O43.192 MARGINAL INSERTION OF UMBILICAL CORD AFFECTING MANAGEMENT OF MOTHER IN SECOND TRIMESTER: ICD-10-CM

## 2025-03-25 DIAGNOSIS — G47.00 INSOMNIA, UNSPECIFIED TYPE: ICD-10-CM

## 2025-03-25 DIAGNOSIS — R42 DIZZINESS: ICD-10-CM

## 2025-03-25 DIAGNOSIS — R51.9 FREQUENT HEADACHES: ICD-10-CM

## 2025-03-25 DIAGNOSIS — O21.9 NAUSEA AND VOMITING DURING PREGNANCY: ICD-10-CM

## 2025-03-25 DIAGNOSIS — O09.92 ENCOUNTER FOR SUPERVISION OF HIGH RISK PREGNANCY IN SECOND TRIMESTER, ANTEPARTUM: Primary | ICD-10-CM

## 2025-03-25 RX ORDER — LANOLIN ALCOHOL/MO/W.PET/CERES
400 CREAM (GRAM) TOPICAL DAILY
Qty: 30 TABLET | Refills: 6 | Status: SHIPPED | OUTPATIENT
Start: 2025-03-25

## 2025-03-26 LAB
ALBUMIN SERPL-MCNC: 3.7 G/DL (ref 3.5–5.2)
ALBUMIN/GLOB SERPL: 1.8 G/DL
ALP SERPL-CCNC: 63 U/L (ref 39–117)
ALT SERPL-CCNC: 9 U/L (ref 1–33)
AST SERPL-CCNC: 12 U/L (ref 1–32)
BASOPHILS # BLD AUTO: 0.02 10*3/MM3 (ref 0–0.2)
BASOPHILS NFR BLD AUTO: 0.2 % (ref 0–1.5)
BILIRUB SERPL-MCNC: 0.3 MG/DL (ref 0–1.2)
BUN SERPL-MCNC: 6 MG/DL (ref 6–20)
BUN/CREAT SERPL: 12.5 (ref 7–25)
CALCIUM SERPL-MCNC: 8.8 MG/DL (ref 8.6–10.5)
CHLORIDE SERPL-SCNC: 105 MMOL/L (ref 98–107)
CO2 SERPL-SCNC: 19.7 MMOL/L (ref 22–29)
CREAT SERPL-MCNC: 0.48 MG/DL (ref 0.57–1)
EGFRCR SERPLBLD CKD-EPI 2021: 133.3 ML/MIN/1.73
EOSINOPHIL # BLD AUTO: 0.12 10*3/MM3 (ref 0–0.4)
EOSINOPHIL NFR BLD AUTO: 1.3 % (ref 0.3–6.2)
ERYTHROCYTE [DISTWIDTH] IN BLOOD BY AUTOMATED COUNT: 12.6 % (ref 12.3–15.4)
GLOBULIN SER CALC-MCNC: 2.1 GM/DL
GLUCOSE SERPL-MCNC: 76 MG/DL (ref 65–99)
HCT VFR BLD AUTO: 36.7 % (ref 34–46.6)
HGB BLD-MCNC: 12.4 G/DL (ref 12–15.9)
IMM GRANULOCYTES # BLD AUTO: 0.04 10*3/MM3 (ref 0–0.05)
IMM GRANULOCYTES NFR BLD AUTO: 0.4 % (ref 0–0.5)
LYMPHOCYTES # BLD AUTO: 2.9 10*3/MM3 (ref 0.7–3.1)
LYMPHOCYTES NFR BLD AUTO: 30.6 % (ref 19.6–45.3)
MCH RBC QN AUTO: 30.4 PG (ref 26.6–33)
MCHC RBC AUTO-ENTMCNC: 33.8 G/DL (ref 31.5–35.7)
MCV RBC AUTO: 90 FL (ref 79–97)
MONOCYTES # BLD AUTO: 0.5 10*3/MM3 (ref 0.1–0.9)
MONOCYTES NFR BLD AUTO: 5.3 % (ref 5–12)
NEUTROPHILS # BLD AUTO: 5.9 10*3/MM3 (ref 1.7–7)
NEUTROPHILS NFR BLD AUTO: 62.2 % (ref 42.7–76)
NRBC BLD AUTO-RTO: 0 /100 WBC (ref 0–0.2)
PLATELET # BLD AUTO: 221 10*3/MM3 (ref 140–450)
POTASSIUM SERPL-SCNC: 4 MMOL/L (ref 3.5–5.2)
PROT SERPL-MCNC: 5.8 G/DL (ref 6–8.5)
RBC # BLD AUTO: 4.08 10*6/MM3 (ref 3.77–5.28)
SODIUM SERPL-SCNC: 139 MMOL/L (ref 136–145)
T3FREE SERPL-MCNC: 3 PG/ML (ref 2–4.4)
T4 FREE SERPL-MCNC: 0.98 NG/DL (ref 0.92–1.68)
TSH SERPL DL<=0.005 MIU/L-ACNC: 1.1 UIU/ML (ref 0.27–4.2)
WBC # BLD AUTO: 9.48 10*3/MM3 (ref 3.4–10.8)

## 2025-03-26 NOTE — PROGRESS NOTES
Chief Complaint  Routine Prenatal Visit (Anatomy scan today, patient complains of headache and dizziness. )    History of Present Illness:  Pamela is a  currently at 18w4d who presents today with complaints of headaches and dizziness.  Patient reports she has them simultaneously.  She has them with any movement or when she is up.  She has not taken any medication.  She does continue to have nausea and occasional emesis.  She does report positive fetal movement.  She denies any vaginal bleeding or spotting.  She is here for anatomic scan.  She also has complaints of insomnia.    Exam:  Vitals:  See prenatal flowsheet as noted and reviewed  General: Alert, cooperative, and does not appear in any distress  Abdomen:   See prenatal flowsheet as noted and reviewed    Uterus gravid, non-tender; no palpable masses    No guarding or rebound tenderness  Pelvic:  See prenatal flowsheet as noted and reviewed  Ext:  See prenatal flowsheet as noted and reviewed    Moves extremities well, no cyanosis and no redness  Urine:  See prenatal flowsheet as noted and reviewed    Data Review:  The following data was reviewed by: Nisa Hernandez MD on 2025:  Prenatal Labs:  Lab Results   Component Value Date    HGB 13.8 2025    RUBELLAABIGG 1.00 2025    HEPBSAG Negative 2025    ABO O 2025    RH Positive 2025    ABSCRN Negative 2025    [id number] Non Reactive 2025    HEPCVIRUSABY Non-Reactive 2022    STREPGPB Negative 2021    URINECX No growth 2024       No visits with results within 1 Month(s) from this visit.   Latest known visit with results is:   Routine Prenatal on 02/10/2025   Component Date Value    Gestation 02/10/2025 Joe     Fetal Fraction 02/10/2025 15%     Gestational Age >9: 02/10/2025 Yes     Result 02/10/2025 Negative      Comments 02/10/2025 Comment     Approved By 02/10/2025 Comment     TRISOMY 21 (DOWN SYNDROM* 02/10/2025 Negative      TRISOMY 18 (GONZALEZ SYND* 02/10/2025 Negative     TRISOMY 13 (PATAU SYNDRO* 02/10/2025 Negative     FETAL SEX 02/10/2025 Comment     MONOSOMY X (CALLEJAS SYNDR* 02/10/2025 Not Detected     XYY (MONTGOMERY SYNDROME) 02/10/2025 Not Detected     XXY (KLINEFELTER SYNDROM* 02/10/2025 Not Detected     XXX (TRIPLE X SYNDROME) 02/10/2025 Not Detected     NEGATIVE PREDICTIVE VALUE 02/10/2025 Note     POSITIVE PREDICTIVE VALUE 02/10/2025 N/A     About The Test 02/10/2025 Comment     Test Method 02/10/2025 Comment     Performance 02/10/2025 Comment     PERFORMANCE CHARACTERIST* 02/10/2025 Note     Limitations of the Test 02/10/2025 Comment     Note 02/10/2025 Comment     References 02/10/2025 Comment      Imaging:  US Ob Limited 1 + Fetuses  Pamela Pena  : 2000  MRN: [id number]  Date: 2/10/2025    Reason for exam/History:  vaginal bleeding    Ultrasound images are reviewed.  There is noted to be a viable   intrauterine pregnancy.   The fetal heart rate was normal.   The fetal   heart rate was 151 beats per minute.  The fetus was noted to be active.      The cervix and bilateral ovaries appear normal.  There is no evidence of   subchorionic hematoma.    The exam limitations noted:  none    See ultrasound report for measurements and structures identified.    Nisa Hernandez MD, [facility]  OB GYN [place]      Medical Records:  None    Assessment and Plan:  Problem List Items Addressed This Visit    None  Visit Diagnoses         Encounter for supervision of high risk pregnancy in second trimester, antepartum    -  Primary  Topics discussed:     ab precautions  genetic screening - Today we discussed genetic testing.  She is aware that the MSAFP-4 is a screening test.  A screening test is not a diagnostic test.  This means that a negative test does not guarantee an unaffected fetus and a positive test does not mean the fetus has the condition for which the test is being performed.  If the test returns  positive, a diagnostic test should be consider to determine if the fetus in fact has the condition.  After considering the options previously presented, she is not interested in having genetic testing performed.  iron supplementation  kick counts and fetal movement  PIH precautions   labor signs and symptoms  Anatomic scan today.  Patient inform regarding those findings.      Dizziness      Patient has poor p.o. intake.  I have discussed with the patient increase in her p.o. fluids.  I have discussed with the patient small frequent meals.  Patient is also to consider compression stockings.  Labs today as noted.  Patient to call if worsening and/or changes in her symptoms.    Relevant Orders    CBC & Differential    Comprehensive Metabolic Panel    TSH    T4, Free    T3, Free      Nausea and vomiting during pregnancy      Patient with improvement in her nausea and vomiting.  Prescription given for Unisom as noted.  Patient may continue with her vitamin B6.    Relevant Medications    doxylamine (UNISOM) 25 MG tablet      Marginal insertion of umbilical cord affecting management of mother in second trimester      Patient informed regarding the findings on ultrasound.  Will continue to monitor for growth as discussed.      Frequent headaches      Prescription is given for magnesium oxide.  Have discussed with the patient increase in p.o. fluids as well.  Labs today as discussed.  Patient to call if worsening and/or changes in her symptoms.    Relevant Orders    CBC & Differential    Comprehensive Metabolic Panel    TSH    T4, Free    T3, Free      Insomnia, unspecified type        Relevant Medications    doxylamine (UNISOM) 25 MG tablet          Follow Up/Instructions:  Follow up as scheduled.  Patient was given instructions and counseling regarding her condition or for health maintenance advice. Please see specific information pulled into the AVS if appropriate.     Note: Speech recognition transcription  software may have been used to dictate portions of this document.  An attempt at proofreading has been made though minor errors in transcription may still be present.    This note was electronically signed.  Nisa Hernandez M.D.

## 2025-04-08 ENCOUNTER — ROUTINE PRENATAL (OUTPATIENT)
Dept: OBSTETRICS AND GYNECOLOGY | Facility: CLINIC | Age: 25
End: 2025-04-08
Payer: MEDICAID

## 2025-04-08 VITALS — BODY MASS INDEX: 31.99 KG/M2 | WEIGHT: 198.2 LBS | DIASTOLIC BLOOD PRESSURE: 64 MMHG | SYSTOLIC BLOOD PRESSURE: 100 MMHG

## 2025-04-08 DIAGNOSIS — Z34.92 SECOND TRIMESTER PREGNANCY: Primary | ICD-10-CM

## 2025-04-08 NOTE — PROGRESS NOTES
Chief Complaint   Patient presents with    Routine Prenatal Visit     Prenatal visit with no problems or concenrs        HPI:   , 20w3d gestation reports doing well    ROS:  See Prenatal Episode/Flowsheet  /64   Wt 89.9 kg (198 lb 3.2 oz)   LMP 10/01/2024   BMI 31.99 kg/m²      EXAM:  EXTREMITIES:  No swelling-See Prenatal Episode/Flowsheet    ABDOMEN:  FHTs/Movement noted-See Prenatal Episode/Flowsheet    URINE GLUCOSE/PROTEIN:  See Prenatal Episode/Flowsheet    PELVIC EXAM:  See Prenatal Episode/Flowsheet  CV:  Lungs:  GYN:    MDM:    Lab Results   Component Value Date    HGB 12.4 2025    RUBELLAABIGG 1.00 2025    HEPBSAG Negative 2025    ABO O 2025    RH Positive 2025    ABSCRN Negative 2025    [id number] Non Reactive 2025    HEPCVIRUSABY Non-Reactive 2022    STREPGPB Negative 2021    URINECX No growth 2024       U/S:US Ob 14 + Weeks Single or First Gestation (2025 13:26)     1. IUP 20w3d  2. Routine care   3. Marginal insertion  4. Glucola next time

## 2025-04-17 ENCOUNTER — PATIENT OUTREACH (OUTPATIENT)
Dept: LABOR AND DELIVERY | Facility: HOSPITAL | Age: 25
End: 2025-04-17
Payer: MEDICAID

## 2025-04-17 NOTE — OUTREACH NOTE
[facility]  Unable to Reach    Questions/Answers      Flowsheet Row Responses   Pending Outreach Prenatal Check-in   Call Attempt First   Outcome No answer/busy, Left message   Next Call Attempt Date 05/09/25                Maribel LOGAN  Maternity Nurse Navigator    4/17/2025, 13:57 EDT

## 2025-05-09 ENCOUNTER — ROUTINE PRENATAL (OUTPATIENT)
Dept: OBSTETRICS AND GYNECOLOGY | Facility: CLINIC | Age: 25
End: 2025-05-09
Payer: MEDICAID

## 2025-05-09 VITALS — BODY MASS INDEX: 32.6 KG/M2 | WEIGHT: 202 LBS | DIASTOLIC BLOOD PRESSURE: 66 MMHG | SYSTOLIC BLOOD PRESSURE: 112 MMHG

## 2025-05-09 DIAGNOSIS — O43.192 MARGINAL INSERTION OF UMBILICAL CORD AFFECTING MANAGEMENT OF MOTHER IN SECOND TRIMESTER: ICD-10-CM

## 2025-05-09 DIAGNOSIS — Z34.92 PRENATAL CARE, SECOND TRIMESTER: Primary | ICD-10-CM

## 2025-05-09 NOTE — PROGRESS NOTES
Prenatal Care Visit    Subjective   Chief Complaint   Patient presents with    Routine Prenatal Visit     1 HOUR glucola     History:   Pamela is a  currently at 24w6d who presents for a prenatal care visit today.    Doing well. Denies CTX, VB, LOF. Reports (+) FM.       Objective   /66   Wt 91.6 kg (202 lb)   LMP 10/01/2024   BMI 32.60 kg/m²   Physical Exam:  Normal, gestational age-appropriate exam today        Assessment & Plan     IUP @ 24w6d  Routine care: I have reviewed the prenatal labs and ultrasound(s) today. I have reviewed the most recent prenatal progress note(s). CBC, GTT today.  Marginal cord insertion: plan early EFW scan next visit     Diagnosis Plan   1. Prenatal care, second trimester  Gestational Screen 1 Hr ([facility])    CBC (No Diff)      2. Marginal insertion of umbilical cord affecting management of mother in second trimester           Medication Management: continue PNV    Topics discussed: Prenatal care milestones  Kick counts and fetal movement   labor signs and symptoms   Tests next visit: U/S for EFW due to marginal cord insertion   Next visit: 4 week(s)     Ansley Kim MD  Obstetrics and Gynecology  [facility]

## 2025-05-10 LAB
ERYTHROCYTE [DISTWIDTH] IN BLOOD BY AUTOMATED COUNT: 12.5 % (ref 12.3–15.4)
GLUCOSE 1H P 50 G GLC PO SERPL-MCNC: 160 MG/DL (ref 65–139)
HCT VFR BLD AUTO: 38 % (ref 34–46.6)
HGB BLD-MCNC: 12.4 G/DL (ref 12–15.9)
MCH RBC QN AUTO: 30.7 PG (ref 26.6–33)
MCHC RBC AUTO-ENTMCNC: 32.6 G/DL (ref 31.5–35.7)
MCV RBC AUTO: 94.1 FL (ref 79–97)
PLATELET # BLD AUTO: 203 10*3/MM3 (ref 140–450)
RBC # BLD AUTO: 4.04 10*6/MM3 (ref 3.77–5.28)
WBC # BLD AUTO: 11.52 10*3/MM3 (ref 3.4–10.8)

## 2025-05-12 ENCOUNTER — RESULTS FOLLOW-UP (OUTPATIENT)
Dept: OBSTETRICS AND GYNECOLOGY | Facility: CLINIC | Age: 25
End: 2025-05-12
Payer: MEDICAID

## 2025-05-12 DIAGNOSIS — O99.810 ABNORMAL GLUCOSE TOLERANCE TEST IN PREGNANCY: Primary | ICD-10-CM

## 2025-05-23 ENCOUNTER — TELEPHONE (OUTPATIENT)
Dept: OBSTETRICS AND GYNECOLOGY | Facility: CLINIC | Age: 25
End: 2025-05-23
Payer: MEDICAID

## 2025-05-23 RX ORDER — DIPHENHYDRAMINE HYDROCHLORIDE 25 MG/1
1 CAPSULE, LIQUID FILLED ORAL AS NEEDED
Qty: 1 EACH | Refills: 0 | Status: SHIPPED | OUTPATIENT
Start: 2025-05-23

## 2025-05-23 RX ORDER — LANCETS
EACH MISCELLANEOUS
Qty: 100 EACH | Refills: 12 | Status: SHIPPED | OUTPATIENT
Start: 2025-05-23

## 2025-05-23 NOTE — TELEPHONE ENCOUNTER
She was doing a three hour Glucola today but got sick and couldn't complete it. She asked if you wanted her to repeat the testing or do finger sticks at home.

## 2025-06-09 ENCOUNTER — ROUTINE PRENATAL (OUTPATIENT)
Dept: OBSTETRICS AND GYNECOLOGY | Facility: CLINIC | Age: 25
End: 2025-06-09
Payer: MEDICAID

## 2025-06-09 VITALS — BODY MASS INDEX: 33.12 KG/M2 | DIASTOLIC BLOOD PRESSURE: 68 MMHG | WEIGHT: 205.2 LBS | SYSTOLIC BLOOD PRESSURE: 100 MMHG

## 2025-06-09 DIAGNOSIS — Z34.83 ENCOUNTER FOR SUPERVISION OF OTHER NORMAL PREGNANCY IN THIRD TRIMESTER: Primary | ICD-10-CM

## 2025-06-09 NOTE — PROGRESS NOTES
Chief Complaint   Patient presents with    Routine Prenatal Visit     Prenatal visit with Growth Scan done today. Complains of legs being discolored and aching.       HPI: Pamela is a  currently at 29w2d here for prenatal visit who reports the following:  Baby is active. She denies any ctxs. She has been having some pain in her legs with some discoloration with blotchy spots. FBS < 80s, 1 hr PP 120s except when she has increased carbs. No log with her.                EXAM:     Vitals:    25 1406   BP: 100/68      Abdomen:   See prenatal flowsheet as noted and reviewed, soft, nontender   Pelvic:  See prenatal flowsheet as noted and reviewed   Urine:  See prenatal flowsheet as noted and reviewed               Extremities:       neg Homans, normal color, no varicosities    Lab Results   Component Value Date    ABO O 2025    RH Positive 2025    ABSCRN Negative 2025       MDM:  Impression: Supervision of low risk pregnancy  Failed 1 hr glucola  Marginal cord insertion   Tests done today: U/S for EFW - 2#14oz, 28%, AC 23%, vtx, anterior placenta-marginal cord insertion 4.2 cm from edge  US Ob Follow Up Transabdominal Approach (2025 14:08)    Topics discussed: kick counts and fetal movement   labor signs and symptoms  Repeat US in 4 weeks  Reviewed OB labs   Tests next visit: none                RTO:                        2 weeks    This note was electronically signed.  Lori Garcia, LINDA  2025

## 2025-06-23 ENCOUNTER — HOSPITAL ENCOUNTER (OUTPATIENT)
Facility: HOSPITAL | Age: 25
Discharge: HOME | End: 2025-06-23
Attending: OBSTETRICS & GYNECOLOGY | Admitting: OBSTETRICS & GYNECOLOGY
Payer: MEDICAID

## 2025-06-23 ENCOUNTER — ROUTINE PRENATAL (OUTPATIENT)
Dept: OBSTETRICS AND GYNECOLOGY | Facility: CLINIC | Age: 25
End: 2025-06-23
Payer: MEDICAID

## 2025-06-23 VITALS
HEIGHT: 66 IN | WEIGHT: 206.4 LBS | TEMPERATURE: 97.6 F | DIASTOLIC BLOOD PRESSURE: 70 MMHG | OXYGEN SATURATION: 99 % | HEART RATE: 96 BPM | RESPIRATION RATE: 18 BRPM | BODY MASS INDEX: 33.17 KG/M2 | SYSTOLIC BLOOD PRESSURE: 119 MMHG

## 2025-06-23 VITALS — SYSTOLIC BLOOD PRESSURE: 112 MMHG | BODY MASS INDEX: 33.28 KG/M2 | DIASTOLIC BLOOD PRESSURE: 70 MMHG | WEIGHT: 206.2 LBS

## 2025-06-23 DIAGNOSIS — Z34.93 THIRD TRIMESTER PREGNANCY: Primary | ICD-10-CM

## 2025-06-23 LAB
BILIRUB BLD-MCNC: NEGATIVE MG/DL
CLARITY, POC: CLEAR
COLOR UR: YELLOW
GLUCOSE UR STRIP-MCNC: NEGATIVE MG/DL
KETONES UR QL: NEGATIVE
LEUKOCYTE EST, POC: ABNORMAL
NITRITE UR-MCNC: NEGATIVE MG/ML
PH UR: 6.5 [PH] (ref 5–8)
PROT UR STRIP-MCNC: ABNORMAL MG/DL
RBC # UR STRIP: NEGATIVE /UL
SP GR UR: 1.02 (ref 1–1.03)
UROBILINOGEN UR QL: NORMAL

## 2025-06-23 PROCEDURE — 99213 OFFICE O/P EST LOW 20 MIN: CPT | Performed by: OBSTETRICS & GYNECOLOGY

## 2025-06-23 PROCEDURE — G0463 HOSPITAL OUTPT CLINIC VISIT: HCPCS

## 2025-06-23 PROCEDURE — 59025 FETAL NON-STRESS TEST: CPT | Performed by: OBSTETRICS & GYNECOLOGY

## 2025-06-23 PROCEDURE — 81002 URINALYSIS NONAUTO W/O SCOPE: CPT | Performed by: OBSTETRICS & GYNECOLOGY

## 2025-06-23 PROCEDURE — 59025 FETAL NON-STRESS TEST: CPT

## 2025-06-23 RX ORDER — SODIUM CHLORIDE 9 MG/ML
40 INJECTION, SOLUTION INTRAVENOUS AS NEEDED
Status: DISCONTINUED | OUTPATIENT
Start: 2025-06-23 | End: 2025-06-23 | Stop reason: HOSPADM

## 2025-06-23 RX ORDER — LIDOCAINE HYDROCHLORIDE 10 MG/ML
0.5 INJECTION, SOLUTION INFILTRATION; PERINEURAL ONCE AS NEEDED
Status: DISCONTINUED | OUTPATIENT
Start: 2025-06-23 | End: 2025-06-23 | Stop reason: HOSPADM

## 2025-06-23 RX ORDER — SODIUM CHLORIDE 0.9 % (FLUSH) 0.9 %
10 SYRINGE (ML) INJECTION EVERY 12 HOURS SCHEDULED
Status: DISCONTINUED | OUTPATIENT
Start: 2025-06-23 | End: 2025-06-23 | Stop reason: HOSPADM

## 2025-06-23 RX ORDER — SODIUM CHLORIDE 0.9 % (FLUSH) 0.9 %
10 SYRINGE (ML) INJECTION AS NEEDED
Status: DISCONTINUED | OUTPATIENT
Start: 2025-06-23 | End: 2025-06-23 | Stop reason: HOSPADM

## 2025-06-23 NOTE — NON STRESS TEST
Triage Note - Nursing Documentation  Labor and Delivery Admission Log    Pamela Pena  : 2000  MRN: [id number]  CSN: [id number]    Date in / Time in:  2025  Time in: 1413    Date out / Time out:    Time out: 1515    Nurse: Soco Castro RN    Patient Info: She is a 25 y.o. year old  at 31w2d with an RODGER of 2025, by Ultrasound who was seen on the [facility] Labor [facility].    Chief Complaint:   Chief Complaint   Patient presents with    Non-stress Test     Decreased fetal movement.        Provider Instructions / Disposition: Fetal tracing reassuring, patient reported positive fetal movement. Discharge home, instructed on fetal kick counts, s/s of labor, reasons to return to labor mack and to follow up with OB provider. Patient verbalized understanding.     Patient Active Problem List   Diagnosis    Depression with suicidal ideation    Amphetamine and other psychostimulant dependence, continuous    Cocaine dependence, unspecified    Cannabis dependence, unspecified    Opioid abuse, unspecified    Pregnancy       NST Documentation (Only applicable > 32 weeks): Interpretation A  Nonstress Test Interpretation A: Reactive (25 1514 : Soco Castro, RN)

## 2025-06-23 NOTE — NON STRESS TEST
Non Stress Test    [facility]    Patient: Pamela Pena  : 2000  MRN: [id number]  CSN: [id number]    Gestational Age: 31w2d    Indication for NST decreased fetal movement       Time On 14:13   Time Off 15:15       Interpretation    Baseline 's beats per minute   Category 1   Decelerations Absent       Additional Comments See nursing notes       Recommendations for f/u See nursing notes       This note has been electronically signed.    Nisa Hernandez M.D.

## 2025-06-23 NOTE — PROGRESS NOTES
Chief Complaint   Patient presents with    Routine Prenatal Visit     Prenatal visit with concerns of swelling in legs with tingling. Also having more headaches. Decreased movement        HPI:   , 31w2d gestation reports doing well    ROS:  See Prenatal Episode/Flowsheet  /70   Wt 93.5 kg (206 lb 3.2 oz)   LMP 10/01/2024   BMI 33.28 kg/m²      EXAM:  EXTREMITIES:  No swelling-See Prenatal Episode/Flowsheet    ABDOMEN:  FHTs/Movement noted-See Prenatal Episode/Flowsheet    URINE GLUCOSE/PROTEIN:  See Prenatal Episode/Flowsheet    PELVIC EXAM:  See Prenatal Episode/Flowsheet  CV:  Lungs:  GYN:    MDM:    Lab Results   Component Value Date    HGB 12.4 2025    RUBELLAABIGG 1.00 2025    HEPBSAG Negative 2025    ABO O 2025    RH Positive 2025    ABSCRN Negative 2025    [id number] Non Reactive 2025    HEPCVIRUSABY Non-Reactive 2022    STREPGPB Negative 2021    URINECX No growth 2024       U/S:    1. IUP 31w2d  2. Routine care   3. A1 GDM: FSBS wNL- no log  4. Decreased fetal movement- NST

## 2025-07-11 DIAGNOSIS — O21.9 NAUSEA AND VOMITING DURING PREGNANCY: ICD-10-CM

## 2025-07-11 DIAGNOSIS — G47.00 INSOMNIA, UNSPECIFIED TYPE: ICD-10-CM

## 2025-07-11 RX ORDER — DOXYLAMINE SUCCINATE 25 MG/1
25 TABLET ORAL NIGHTLY PRN
Qty: 30 TABLET | Refills: 2 | Status: SHIPPED | OUTPATIENT
Start: 2025-07-11

## 2025-07-14 ENCOUNTER — ROUTINE PRENATAL (OUTPATIENT)
Dept: OBSTETRICS AND GYNECOLOGY | Facility: CLINIC | Age: 25
End: 2025-07-14
Payer: MEDICAID

## 2025-07-14 VITALS — DIASTOLIC BLOOD PRESSURE: 74 MMHG | SYSTOLIC BLOOD PRESSURE: 112 MMHG | WEIGHT: 205.6 LBS | BODY MASS INDEX: 33.18 KG/M2

## 2025-07-14 DIAGNOSIS — Z30.2 REQUEST FOR STERILIZATION: ICD-10-CM

## 2025-07-14 DIAGNOSIS — Z34.93 PRENATAL CARE, THIRD TRIMESTER: Primary | ICD-10-CM

## 2025-07-14 DIAGNOSIS — O99.810 ABNORMAL GLUCOSE TOLERANCE TEST IN PREGNANCY: ICD-10-CM

## 2025-07-14 DIAGNOSIS — O43.193 MARGINAL INSERTION OF UMBILICAL CORD AFFECTING MANAGEMENT OF MOTHER IN THIRD TRIMESTER: ICD-10-CM

## 2025-07-14 DIAGNOSIS — Z36.89 ENCOUNTER FOR ULTRASOUND TO ASSESS FETAL GROWTH: ICD-10-CM

## 2025-07-14 NOTE — PROGRESS NOTES
Prenatal Care Visit    Subjective   Chief Complaint   Patient presents with    Routine Prenatal Visit     Growth scan done today. C/o pressure in her lower back as well as what she thinks is real contractions      History:   Pamela is a  currently at 34w2d who presents for a prenatal care visit today.    Reports (+) CTX, particularly when active. Denies VB, LOF. Reports (+) FM.       Objective   /74   Wt 93.3 kg (205 lb 9.6 oz)   LMP 10/01/2024   BMI 33.18 kg/m²   Physical Exam:  Normal, gestational age-appropriate exam today   CVX: 0.5-/-3, posterior       Assessment & Plan     IUP @ 34w2d  Routine care: I have reviewed the prenatal labs and ultrasound(s) today. I have reviewed the most recent prenatal progress note(s). EFW scan today: EFW 2343g (38%), AC 53%, vertex, ADAM 12.4 cm.  Marginal cord insertion: reassuring growth scan as above  Abnormal 1h GTT, did not tolerate 3h: has not had logs but reports all readings have been normal  Request for sterilization: sign FF today     Diagnosis Plan   1. Prenatal care, third trimester        2. Encounter for ultrasound to assess fetal growth  US Ob Follow Up Transabdominal Approach      3. Marginal insertion of umbilical cord affecting management of mother in third trimester  US Ob Follow Up Transabdominal Approach      4. Abnormal glucose tolerance test in pregnancy  US Ob Follow Up Transabdominal Approach      5. Request for sterilization           Medication Management: continue PNV    Topics discussed: Prenatal care milestones  TDAP vaccination  Kick counts and fetal movement   labor signs and symptoms  Induction of labor  Tubal risks, benefits   Tests next visit: GBS testing   Next visit: 2 week(s)     Ansley Kim MD  Obstetrics and Gynecology  [facility]

## 2025-08-01 ENCOUNTER — ROUTINE PRENATAL (OUTPATIENT)
Dept: OBSTETRICS AND GYNECOLOGY | Facility: CLINIC | Age: 25
End: 2025-08-01
Payer: MEDICAID

## 2025-08-01 VITALS — DIASTOLIC BLOOD PRESSURE: 68 MMHG | BODY MASS INDEX: 33.54 KG/M2 | WEIGHT: 207.8 LBS | SYSTOLIC BLOOD PRESSURE: 106 MMHG

## 2025-08-01 DIAGNOSIS — Z30.2 REQUEST FOR STERILIZATION: ICD-10-CM

## 2025-08-01 DIAGNOSIS — Z34.83 PRENATAL CARE, SUBSEQUENT PREGNANCY IN THIRD TRIMESTER: Primary | ICD-10-CM

## 2025-08-01 DIAGNOSIS — Z36.85 ANTENATAL SCREENING FOR STREPTOCOCCUS B: ICD-10-CM

## 2025-08-01 PROCEDURE — 99213 OFFICE O/P EST LOW 20 MIN: CPT | Performed by: OBSTETRICS & GYNECOLOGY

## 2025-08-03 LAB — GP B STREP DNA SPEC QL NAA+PROBE: NEGATIVE

## 2025-08-04 PROBLEM — Z30.2 REQUEST FOR STERILIZATION: Status: ACTIVE | Noted: 2025-08-04

## 2025-08-08 ENCOUNTER — ROUTINE PRENATAL (OUTPATIENT)
Dept: OBSTETRICS AND GYNECOLOGY | Facility: CLINIC | Age: 25
End: 2025-08-08
Payer: MEDICAID

## 2025-08-08 VITALS — SYSTOLIC BLOOD PRESSURE: 120 MMHG | BODY MASS INDEX: 33.64 KG/M2 | DIASTOLIC BLOOD PRESSURE: 72 MMHG | WEIGHT: 208.4 LBS

## 2025-08-08 DIAGNOSIS — O43.193 MARGINAL INSERTION OF UMBILICAL CORD AFFECTING MANAGEMENT OF MOTHER IN THIRD TRIMESTER: ICD-10-CM

## 2025-08-08 DIAGNOSIS — Z30.2 REQUEST FOR STERILIZATION: ICD-10-CM

## 2025-08-08 DIAGNOSIS — Z34.83 ENCOUNTER FOR SUPERVISION OF OTHER NORMAL PREGNANCY IN THIRD TRIMESTER: Primary | ICD-10-CM

## 2025-08-08 DIAGNOSIS — O99.810 ABNORMAL GLUCOSE TOLERANCE TEST IN PREGNANCY: ICD-10-CM

## 2025-08-15 ENCOUNTER — ROUTINE PRENATAL (OUTPATIENT)
Dept: OBSTETRICS AND GYNECOLOGY | Facility: CLINIC | Age: 25
End: 2025-08-15
Payer: MEDICAID

## 2025-08-15 VITALS — BODY MASS INDEX: 33.73 KG/M2 | WEIGHT: 209 LBS | DIASTOLIC BLOOD PRESSURE: 68 MMHG | SYSTOLIC BLOOD PRESSURE: 120 MMHG

## 2025-08-15 DIAGNOSIS — Z34.93 THIRD TRIMESTER PREGNANCY: Primary | ICD-10-CM

## 2025-08-17 ENCOUNTER — HOSPITAL ENCOUNTER (INPATIENT)
Facility: HOSPITAL | Age: 25
LOS: 1 days | Discharge: HOME | End: 2025-08-18
Attending: OBSTETRICS & GYNECOLOGY | Admitting: OBSTETRICS & GYNECOLOGY
Payer: MEDICAID

## 2025-08-17 ENCOUNTER — ANESTHESIA (OUTPATIENT)
Dept: LABOR AND DELIVERY | Facility: HOSPITAL | Age: 25
End: 2025-08-17
Payer: MEDICAID

## 2025-08-17 ENCOUNTER — ANESTHESIA EVENT (OUTPATIENT)
Dept: LABOR AND DELIVERY | Facility: HOSPITAL | Age: 25
End: 2025-08-17
Payer: MEDICAID

## 2025-08-17 PROBLEM — Z34.90 PREGNANCY: Status: RESOLVED | Noted: 2018-08-03 | Resolved: 2025-08-17

## 2025-08-17 PROCEDURE — 25010000002 LIDOCAINE HCL (PF) 1.5 % SOLUTION: Performed by: NURSE ANESTHETIST, CERTIFIED REGISTERED

## 2025-08-17 PROCEDURE — C1755 CATHETER, INTRASPINAL: HCPCS | Performed by: NURSE ANESTHETIST, CERTIFIED REGISTERED

## 2025-08-17 RX ORDER — LIDOCAINE HYDROCHLORIDE 15 MG/ML
INJECTION, SOLUTION EPIDURAL; INFILTRATION; INTRACAUDAL; PERINEURAL AS NEEDED
Status: DISCONTINUED | OUTPATIENT
Start: 2025-08-17 | End: 2025-08-17 | Stop reason: SURG

## 2025-08-17 RX ADMIN — Medication 5 ML: at 07:52

## 2025-08-17 RX ADMIN — LIDOCAINE HYDROCHLORIDE 3 ML: 15 INJECTION, SOLUTION EPIDURAL; INFILTRATION; INTRACAUDAL; PERINEURAL at 07:47

## 2025-08-18 ENCOUNTER — ANESTHESIA EVENT (OUTPATIENT)
Dept: PERIOP | Facility: HOSPITAL | Age: 25
End: 2025-08-18
Payer: MEDICAID

## 2025-08-18 ENCOUNTER — ANESTHESIA (OUTPATIENT)
Dept: PERIOP | Facility: HOSPITAL | Age: 25
End: 2025-08-18
Payer: MEDICAID

## 2025-08-26 ENCOUNTER — PATIENT OUTREACH (OUTPATIENT)
Dept: LABOR AND DELIVERY | Facility: HOSPITAL | Age: 25
End: 2025-08-26
Payer: MEDICAID